# Patient Record
Sex: FEMALE | Race: WHITE | Employment: OTHER | URBAN - METROPOLITAN AREA
[De-identification: names, ages, dates, MRNs, and addresses within clinical notes are randomized per-mention and may not be internally consistent; named-entity substitution may affect disease eponyms.]

---

## 2017-01-16 RX ORDER — CANAGLIFLOZIN 300 MG/1
TABLET, FILM COATED ORAL
Qty: 28 TABLET | Refills: 3 | Status: SHIPPED | OUTPATIENT
Start: 2017-01-16 | End: 2018-06-25 | Stop reason: ALTCHOICE

## 2017-02-02 ENCOUNTER — OFFICE VISIT (OUTPATIENT)
Dept: PHYSICAL MEDICINE AND REHAB | Facility: CLINIC | Age: 62
End: 2017-02-02

## 2017-02-02 VITALS
TEMPERATURE: 98.4 F | DIASTOLIC BLOOD PRESSURE: 82 MMHG | SYSTOLIC BLOOD PRESSURE: 138 MMHG | BODY MASS INDEX: 36.85 KG/M2 | HEIGHT: 65 IN | WEIGHT: 221.2 LBS | HEART RATE: 74 BPM
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
## 2017-02-02 DIAGNOSIS — M25.552 LEFT HIP PAIN: ICD-10-CM

## 2017-02-02 DIAGNOSIS — M25.511 ACUTE PAIN OF RIGHT SHOULDER: Primary | ICD-10-CM
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
## 2017-02-02 PROBLEM — R29.6 FALLS: Status: ACTIVE | Noted: 2017-02-02

## 2017-02-02 PROCEDURE — 3014F SCREEN MAMMO DOC REV: CPT | Performed by: PHYSICAL MEDICINE & REHABILITATION

## 2017-02-02 PROCEDURE — G8484 FLU IMMUNIZE NO ADMIN: HCPCS | Performed by: PHYSICAL MEDICINE & REHABILITATION

## 2017-02-02 PROCEDURE — G8419 CALC BMI OUT NRM PARAM NOF/U: HCPCS | Performed by: PHYSICAL MEDICINE & REHABILITATION

## 2017-02-02 PROCEDURE — 99214 OFFICE O/P EST MOD 30 MIN: CPT | Performed by: PHYSICAL MEDICINE & REHABILITATION

## 2017-02-02 PROCEDURE — 1036F TOBACCO NON-USER: CPT | Performed by: PHYSICAL MEDICINE & REHABILITATION

## 2017-02-02 PROCEDURE — 3017F COLORECTAL CA SCREEN DOC REV: CPT | Performed by: PHYSICAL MEDICINE & REHABILITATION

## 2017-02-02 PROCEDURE — G8427 DOCREV CUR MEDS BY ELIG CLIN: HCPCS | Performed by: PHYSICAL MEDICINE & REHABILITATION

## 2017-02-10 ENCOUNTER — HOSPITAL ENCOUNTER (OUTPATIENT)
Age: 62
Discharge: HOME | End: 2017-02-10
Payer: MEDICARE

## 2017-02-10 LAB
ALBUMIN SERPL-MCNC: 4.2 G/DL (ref 3.5–5.2)
ALBUMIN/GLOBULIN RATIO: ABNORMAL (ref 1–2.5)
ALP BLD-CCNC: 73 U/L (ref 35–104)
ALT SERPL-CCNC: 34 U/L (ref 5–33)
ANION GAP SERPL CALCULATED.3IONS-SCNC: 12 MMOL/L (ref 9–17)
AST SERPL-CCNC: 25 U/L
BILIRUB SERPL-MCNC: 0.4 MG/DL (ref 0.3–1.2)
BUN BLDV-MCNC: 17 MG/DL (ref 8–23)
BUN/CREAT BLD: ABNORMAL (ref 9–20)
CALCIUM SERPL-MCNC: 9.8 MG/DL (ref 8.6–10.4)
CHLORIDE BLD-SCNC: 104 MMOL/L (ref 98–107)
CHOLESTEROL/HDL RATIO: 4.4
CHOLESTEROL: 229 MG/DL
CO2: 26 MMOL/L (ref 20–31)
CREAT SERPL-MCNC: 0.88 MG/DL (ref 0.5–0.9)
GFR AFRICAN AMERICAN: >60 ML/MIN
GFR NON-AFRICAN AMERICAN: >60 ML/MIN
GFR SERPL CREATININE-BSD FRML MDRD: ABNORMAL ML/MIN/{1.73_M2}
GFR SERPL CREATININE-BSD FRML MDRD: ABNORMAL ML/MIN/{1.73_M2}
GLUCOSE BLD-MCNC: 200 MG/DL (ref 70–99)
HDLC SERPL-MCNC: 52 MG/DL
LDL CHOLESTEROL: 143 MG/DL (ref 0–130)
POTASSIUM SERPL-SCNC: 5.1 MMOL/L (ref 3.7–5.3)
SODIUM BLD-SCNC: 142 MMOL/L (ref 135–144)
TOTAL PROTEIN: 7.6 G/DL (ref 6.4–8.3)
TRIGL SERPL-MCNC: 170 MG/DL
VLDLC SERPL CALC-MCNC: ABNORMAL MG/DL (ref 1–30)

## 2017-02-10 PROCEDURE — 80053 COMPREHEN METABOLIC PANEL: CPT

## 2017-02-10 PROCEDURE — 80061 LIPID PANEL: CPT

## 2017-02-10 PROCEDURE — 36415 COLL VENOUS BLD VENIPUNCTURE: CPT

## 2017-02-21 ENCOUNTER — OFFICE VISIT (OUTPATIENT)
Dept: PHYSICAL MEDICINE AND REHAB | Facility: CLINIC | Age: 62
End: 2017-02-21

## 2017-02-21 VITALS
BODY MASS INDEX: 35.77 KG/M2 | TEMPERATURE: 98.2 F | HEIGHT: 65 IN | SYSTOLIC BLOOD PRESSURE: 117 MMHG | HEART RATE: 83 BPM | WEIGHT: 214.7 LBS | DIASTOLIC BLOOD PRESSURE: 73 MMHG

## 2017-02-21 DIAGNOSIS — G89.29 CHRONIC RIGHT SHOULDER PAIN: ICD-10-CM

## 2017-02-21 DIAGNOSIS — M25.511 CHRONIC RIGHT SHOULDER PAIN: ICD-10-CM

## 2017-02-21 DIAGNOSIS — G89.29 CHRONIC RIGHT HIP PAIN: Primary | ICD-10-CM

## 2017-02-21 DIAGNOSIS — M70.62 TROCHANTERIC BURSITIS OF BOTH HIPS: ICD-10-CM

## 2017-02-21 DIAGNOSIS — M70.61 TROCHANTERIC BURSITIS OF BOTH HIPS: ICD-10-CM

## 2017-02-21 DIAGNOSIS — M25.551 CHRONIC RIGHT HIP PAIN: Primary | ICD-10-CM

## 2017-02-21 PROCEDURE — G8484 FLU IMMUNIZE NO ADMIN: HCPCS | Performed by: PHYSICAL MEDICINE & REHABILITATION

## 2017-02-21 PROCEDURE — G8417 CALC BMI ABV UP PARAM F/U: HCPCS | Performed by: PHYSICAL MEDICINE & REHABILITATION

## 2017-02-21 PROCEDURE — 1036F TOBACCO NON-USER: CPT | Performed by: PHYSICAL MEDICINE & REHABILITATION

## 2017-02-21 PROCEDURE — 3014F SCREEN MAMMO DOC REV: CPT | Performed by: PHYSICAL MEDICINE & REHABILITATION

## 2017-02-21 PROCEDURE — G8427 DOCREV CUR MEDS BY ELIG CLIN: HCPCS | Performed by: PHYSICAL MEDICINE & REHABILITATION

## 2017-02-21 PROCEDURE — 99214 OFFICE O/P EST MOD 30 MIN: CPT | Performed by: PHYSICAL MEDICINE & REHABILITATION

## 2017-02-21 PROCEDURE — 3017F COLORECTAL CA SCREEN DOC REV: CPT | Performed by: PHYSICAL MEDICINE & REHABILITATION

## 2017-05-03 ENCOUNTER — HOSPITAL ENCOUNTER (OUTPATIENT)
Dept: PHYSICAL THERAPY | Age: 62
Setting detail: THERAPIES SERIES
Discharge: HOME | End: 2017-05-03
Payer: MEDICARE

## 2017-05-03 PROCEDURE — 97162 PT EVAL MOD COMPLEX 30 MIN: CPT

## 2017-05-03 PROCEDURE — G8978 MOBILITY CURRENT STATUS: HCPCS

## 2017-05-03 PROCEDURE — G8979 MOBILITY GOAL STATUS: HCPCS

## 2017-05-03 ASSESSMENT — PAIN SCALES - GENERAL: PAINLEVEL_OUTOF10: 6

## 2017-05-03 ASSESSMENT — PAIN DESCRIPTION - PAIN TYPE: TYPE: CHRONIC PAIN

## 2017-05-03 ASSESSMENT — PAIN DESCRIPTION - ORIENTATION: ORIENTATION: LEFT;RIGHT

## 2017-05-03 ASSESSMENT — PAIN DESCRIPTION - LOCATION: LOCATION: HIP

## 2017-05-05 ENCOUNTER — HOSPITAL ENCOUNTER (OUTPATIENT)
Dept: WOMENS IMAGING | Age: 62
Discharge: HOME | End: 2017-05-05
Payer: MEDICARE

## 2017-05-05 ENCOUNTER — HOSPITAL ENCOUNTER (OUTPATIENT)
Age: 62
Discharge: HOME | End: 2017-05-05
Payer: MEDICARE

## 2017-05-05 DIAGNOSIS — Z12.39 BREAST CANCER SCREENING: ICD-10-CM

## 2017-05-05 DIAGNOSIS — Z13.820 OSTEOPOROSIS SCREENING: ICD-10-CM

## 2017-05-05 LAB
-: ABNORMAL
ABSOLUTE EOS #: 0.2 K/UL (ref 0–0.4)
ABSOLUTE LYMPH #: 2 K/UL (ref 1–4.8)
ABSOLUTE MONO #: 0.9 K/UL (ref 0.1–1.3)
ALBUMIN SERPL-MCNC: 4.4 G/DL (ref 3.5–5.2)
ALBUMIN/GLOBULIN RATIO: ABNORMAL (ref 1–2.5)
ALP BLD-CCNC: 71 U/L (ref 35–104)
ALT SERPL-CCNC: 79 U/L (ref 5–33)
AMORPHOUS: ABNORMAL
ANION GAP SERPL CALCULATED.3IONS-SCNC: 18 MMOL/L (ref 9–17)
AST SERPL-CCNC: 66 U/L
BACTERIA: ABNORMAL
BASOPHILS # BLD: 1 %
BASOPHILS ABSOLUTE: 0.1 K/UL (ref 0–0.2)
BILIRUB SERPL-MCNC: 0.47 MG/DL (ref 0.3–1.2)
BILIRUBIN DIRECT: 0.14 MG/DL
BILIRUBIN URINE: ABNORMAL
BILIRUBIN, INDIRECT: 0.33 MG/DL (ref 0–1)
BUN BLDV-MCNC: 26 MG/DL (ref 8–23)
BUN/CREAT BLD: ABNORMAL (ref 9–20)
CALCIUM SERPL-MCNC: 9.8 MG/DL (ref 8.6–10.4)
CASTS UA: ABNORMAL /LPF
CHLORIDE BLD-SCNC: 98 MMOL/L (ref 98–107)
CHOLESTEROL/HDL RATIO: 3.3
CHOLESTEROL: 211 MG/DL
CO2: 23 MMOL/L (ref 20–31)
COLOR: YELLOW
COMMENT UA: ABNORMAL
CREAT SERPL-MCNC: 0.96 MG/DL (ref 0.5–0.9)
CREATININE URINE: 242.7 MG/DL (ref 28–217)
CRYSTALS, UA: ABNORMAL /HPF
DIFFERENTIAL TYPE: ABNORMAL
EOSINOPHILS RELATIVE PERCENT: 2 %
EPITHELIAL CELLS UA: ABNORMAL /HPF
ESTIMATED AVERAGE GLUCOSE: 163 MG/DL
GFR AFRICAN AMERICAN: >60 ML/MIN
GFR NON-AFRICAN AMERICAN: 59 ML/MIN
GFR SERPL CREATININE-BSD FRML MDRD: ABNORMAL ML/MIN/{1.73_M2}
GFR SERPL CREATININE-BSD FRML MDRD: ABNORMAL ML/MIN/{1.73_M2}
GLOBULIN: ABNORMAL G/DL (ref 1.5–3.8)
GLUCOSE BLD-MCNC: 135 MG/DL (ref 70–99)
GLUCOSE URINE: ABNORMAL
HBA1C MFR BLD: 7.3 % (ref 4–6)
HCT VFR BLD CALC: 39.6 % (ref 36–46)
HDLC SERPL-MCNC: 64 MG/DL
HEMOGLOBIN: 13.1 G/DL (ref 12–16)
KETONES, URINE: NEGATIVE
LDL CHOLESTEROL: 116 MG/DL (ref 0–130)
LEUKOCYTE ESTERASE, URINE: NEGATIVE
LYMPHOCYTES # BLD: 18 %
MCH RBC QN AUTO: 27.3 PG (ref 26–34)
MCHC RBC AUTO-ENTMCNC: 33.1 G/DL (ref 31–37)
MCV RBC AUTO: 82.5 FL (ref 80–100)
MICROALBUMIN/CREAT 24H UR: 54 MG/L
MICROALBUMIN/CREAT UR-RTO: 22 MCG/MG CREAT
MONOCYTES # BLD: 8 %
MUCUS: ABNORMAL
NITRITE, URINE: NEGATIVE
OTHER OBSERVATIONS UA: ABNORMAL
PDW BLD-RTO: 15.7 % (ref 11.5–14.9)
PH UA: 5 (ref 5–8)
PLATELET # BLD: 298 K/UL (ref 150–450)
PLATELET ESTIMATE: ABNORMAL
PMV BLD AUTO: 8.2 FL (ref 6–12)
POTASSIUM SERPL-SCNC: 4.1 MMOL/L (ref 3.7–5.3)
PROTEIN UA: ABNORMAL
RBC # BLD: 4.8 M/UL (ref 4–5.2)
RBC # BLD: ABNORMAL 10*6/UL
RBC UA: ABNORMAL /HPF
RENAL EPITHELIAL, UA: ABNORMAL /HPF
SEG NEUTROPHILS: 71 %
SEGMENTED NEUTROPHILS ABSOLUTE COUNT: 7.7 K/UL (ref 1.3–9.1)
SODIUM BLD-SCNC: 139 MMOL/L (ref 135–144)
SPECIFIC GRAVITY UA: 1.04 (ref 1–1.03)
THYROXINE, FREE: 1.11 NG/DL (ref 0.93–1.7)
TOTAL PROTEIN: 8.1 G/DL (ref 6.4–8.3)
TRICHOMONAS: ABNORMAL
TRIGL SERPL-MCNC: 154 MG/DL
TSH SERPL DL<=0.05 MIU/L-ACNC: 0.84 MIU/L (ref 0.3–5)
TSH SERPL DL<=0.05 MIU/L-ACNC: 0.89 MIU/L (ref 0.3–5)
TURBIDITY: ABNORMAL
URINE HGB: NEGATIVE
UROBILINOGEN, URINE: NORMAL
VITAMIN D 25-HYDROXY: 40.6 NG/ML (ref 30–100)
VLDLC SERPL CALC-MCNC: ABNORMAL MG/DL (ref 1–30)
WBC # BLD: 10.9 K/UL (ref 3.5–11)
WBC # BLD: ABNORMAL 10*3/UL
WBC UA: ABNORMAL /HPF
YEAST: ABNORMAL

## 2017-05-05 PROCEDURE — 85025 COMPLETE CBC W/AUTO DIFF WBC: CPT

## 2017-05-05 PROCEDURE — 81001 URINALYSIS AUTO W/SCOPE: CPT

## 2017-05-05 PROCEDURE — 77080 DXA BONE DENSITY AXIAL: CPT

## 2017-05-05 PROCEDURE — 82570 ASSAY OF URINE CREATININE: CPT

## 2017-05-05 PROCEDURE — 77063 BREAST TOMOSYNTHESIS BI: CPT

## 2017-05-05 PROCEDURE — 83036 HEMOGLOBIN GLYCOSYLATED A1C: CPT

## 2017-05-05 PROCEDURE — 82043 UR ALBUMIN QUANTITATIVE: CPT

## 2017-05-05 PROCEDURE — 80048 BASIC METABOLIC PNL TOTAL CA: CPT

## 2017-05-05 PROCEDURE — 84439 ASSAY OF FREE THYROXINE: CPT

## 2017-05-05 PROCEDURE — 36415 COLL VENOUS BLD VENIPUNCTURE: CPT

## 2017-05-05 PROCEDURE — 84443 ASSAY THYROID STIM HORMONE: CPT

## 2017-05-05 PROCEDURE — 82306 VITAMIN D 25 HYDROXY: CPT

## 2017-05-05 PROCEDURE — 80076 HEPATIC FUNCTION PANEL: CPT

## 2017-05-05 PROCEDURE — 80061 LIPID PANEL: CPT

## 2017-05-10 ASSESSMENT — PAIN DESCRIPTION - ORIENTATION: ORIENTATION: LEFT;RIGHT

## 2017-05-10 ASSESSMENT — PAIN DESCRIPTION - PAIN TYPE: TYPE: CHRONIC PAIN

## 2017-05-10 ASSESSMENT — PAIN SCALES - GENERAL: PAINLEVEL_OUTOF10: 6

## 2017-05-10 ASSESSMENT — PAIN DESCRIPTION - LOCATION: LOCATION: HIP

## 2017-05-11 ENCOUNTER — OFFICE VISIT (OUTPATIENT)
Dept: PHYSICAL MEDICINE AND REHAB | Age: 62
End: 2017-05-11
Payer: MEDICARE

## 2017-05-11 VITALS
SYSTOLIC BLOOD PRESSURE: 107 MMHG | DIASTOLIC BLOOD PRESSURE: 69 MMHG | HEIGHT: 66 IN | TEMPERATURE: 98.3 F | BODY MASS INDEX: 34.01 KG/M2 | WEIGHT: 211.6 LBS | HEART RATE: 76 BPM

## 2017-05-11 DIAGNOSIS — M25.552 LEFT HIP PAIN: ICD-10-CM

## 2017-05-11 DIAGNOSIS — M25.551 CHRONIC RIGHT HIP PAIN: Primary | ICD-10-CM

## 2017-05-11 DIAGNOSIS — G89.29 CHRONIC RIGHT HIP PAIN: Primary | ICD-10-CM

## 2017-05-11 PROCEDURE — 3014F SCREEN MAMMO DOC REV: CPT | Performed by: PHYSICAL MEDICINE & REHABILITATION

## 2017-05-11 PROCEDURE — 1036F TOBACCO NON-USER: CPT | Performed by: PHYSICAL MEDICINE & REHABILITATION

## 2017-05-11 PROCEDURE — 99214 OFFICE O/P EST MOD 30 MIN: CPT | Performed by: PHYSICAL MEDICINE & REHABILITATION

## 2017-05-11 PROCEDURE — G8417 CALC BMI ABV UP PARAM F/U: HCPCS | Performed by: PHYSICAL MEDICINE & REHABILITATION

## 2017-05-11 PROCEDURE — G8428 CUR MEDS NOT DOCUMENT: HCPCS | Performed by: PHYSICAL MEDICINE & REHABILITATION

## 2017-05-11 PROCEDURE — 3017F COLORECTAL CA SCREEN DOC REV: CPT | Performed by: PHYSICAL MEDICINE & REHABILITATION

## 2017-05-11 RX ORDER — ARIPIPRAZOLE 5 MG/1
5 TABLET ORAL DAILY
COMMUNITY
End: 2018-06-25 | Stop reason: ALTCHOICE

## 2017-05-11 RX ORDER — LEVOFLOXACIN 5 MG/ML
500 INJECTION, SOLUTION INTRAVENOUS
COMMUNITY
End: 2018-06-25 | Stop reason: ALTCHOICE

## 2017-05-11 RX ORDER — MONTELUKAST SODIUM 10 MG/1
10 TABLET ORAL NIGHTLY
COMMUNITY

## 2017-05-12 ENCOUNTER — HOSPITAL ENCOUNTER (OUTPATIENT)
Dept: PHYSICAL THERAPY | Age: 62
Setting detail: THERAPIES SERIES
Discharge: HOME | End: 2017-05-12
Payer: MEDICARE

## 2017-05-12 PROCEDURE — 97110 THERAPEUTIC EXERCISES: CPT

## 2017-05-12 ASSESSMENT — PAIN DESCRIPTION - PAIN TYPE: TYPE: CHRONIC PAIN

## 2017-05-12 ASSESSMENT — PAIN SCALES - GENERAL: PAINLEVEL_OUTOF10: 5

## 2017-05-12 ASSESSMENT — PAIN DESCRIPTION - LOCATION: LOCATION: BACK

## 2017-05-15 ENCOUNTER — HOSPITAL ENCOUNTER (OUTPATIENT)
Dept: PHYSICAL THERAPY | Age: 62
Setting detail: THERAPIES SERIES
Discharge: HOME | End: 2017-05-15
Payer: MEDICARE

## 2017-05-15 ASSESSMENT — PAIN DESCRIPTION - LOCATION: LOCATION: HIP;BACK;SHOULDER

## 2017-05-15 ASSESSMENT — PAIN SCALES - GENERAL: PAINLEVEL_OUTOF10: 5

## 2017-05-15 ASSESSMENT — PAIN DESCRIPTION - PAIN TYPE: TYPE: CHRONIC PAIN

## 2017-05-15 ASSESSMENT — PAIN DESCRIPTION - ORIENTATION: ORIENTATION: LEFT;RIGHT

## 2017-05-17 ENCOUNTER — HOSPITAL ENCOUNTER (OUTPATIENT)
Dept: PHYSICAL THERAPY | Age: 62
Setting detail: THERAPIES SERIES
Discharge: HOME | End: 2017-05-17
Payer: MEDICARE

## 2017-05-17 PROCEDURE — 97110 THERAPEUTIC EXERCISES: CPT

## 2017-05-17 ASSESSMENT — PAIN DESCRIPTION - PAIN TYPE: TYPE: CHRONIC PAIN

## 2017-05-17 ASSESSMENT — PAIN DESCRIPTION - ORIENTATION: ORIENTATION: LEFT;RIGHT

## 2017-05-17 ASSESSMENT — PAIN SCALES - GENERAL: PAINLEVEL_OUTOF10: 6

## 2017-05-23 ENCOUNTER — HOSPITAL ENCOUNTER (OUTPATIENT)
Dept: PHYSICAL THERAPY | Age: 62
Setting detail: THERAPIES SERIES
Discharge: HOME | End: 2017-05-23
Payer: MEDICARE

## 2017-05-23 PROCEDURE — 97110 THERAPEUTIC EXERCISES: CPT

## 2017-05-23 ASSESSMENT — PAIN DESCRIPTION - ORIENTATION: ORIENTATION: RIGHT

## 2017-05-23 ASSESSMENT — PAIN DESCRIPTION - LOCATION: LOCATION: BACK;HIP

## 2017-05-23 ASSESSMENT — PAIN SCALES - GENERAL: PAINLEVEL_OUTOF10: 7

## 2017-05-23 ASSESSMENT — PAIN DESCRIPTION - PAIN TYPE: TYPE: CHRONIC PAIN

## 2017-05-26 ENCOUNTER — HOSPITAL ENCOUNTER (OUTPATIENT)
Dept: PHYSICAL THERAPY | Age: 62
Setting detail: THERAPIES SERIES
Discharge: HOME | End: 2017-05-26
Payer: MEDICARE

## 2017-05-26 PROCEDURE — 97110 THERAPEUTIC EXERCISES: CPT

## 2017-05-26 ASSESSMENT — PAIN DESCRIPTION - LOCATION: LOCATION: BACK

## 2017-05-26 ASSESSMENT — PAIN SCALES - GENERAL: PAINLEVEL_OUTOF10: 6

## 2017-05-26 ASSESSMENT — PAIN DESCRIPTION - PAIN TYPE: TYPE: CHRONIC PAIN

## 2017-05-26 ASSESSMENT — PAIN DESCRIPTION - ORIENTATION: ORIENTATION: LOWER

## 2017-06-01 ENCOUNTER — HOSPITAL ENCOUNTER (OUTPATIENT)
Dept: PHYSICAL THERAPY | Age: 62
Setting detail: THERAPIES SERIES
Discharge: HOME | End: 2017-06-01
Payer: MEDICARE

## 2017-06-01 PROCEDURE — 97110 THERAPEUTIC EXERCISES: CPT

## 2017-06-01 PROCEDURE — G8978 MOBILITY CURRENT STATUS: HCPCS

## 2017-06-01 PROCEDURE — G8979 MOBILITY GOAL STATUS: HCPCS

## 2017-06-01 ASSESSMENT — PAIN DESCRIPTION - PAIN TYPE: TYPE: CHRONIC PAIN

## 2017-06-01 ASSESSMENT — PAIN DESCRIPTION - LOCATION: LOCATION: BACK

## 2017-06-01 ASSESSMENT — PAIN SCALES - GENERAL: PAINLEVEL_OUTOF10: 5

## 2017-06-02 ENCOUNTER — HOSPITAL ENCOUNTER (OUTPATIENT)
Dept: PHYSICAL THERAPY | Age: 62
Setting detail: THERAPIES SERIES
Discharge: HOME | End: 2017-06-02
Payer: MEDICARE

## 2017-06-02 PROCEDURE — 97110 THERAPEUTIC EXERCISES: CPT

## 2017-06-02 PROCEDURE — 97112 NEUROMUSCULAR REEDUCATION: CPT

## 2017-06-02 ASSESSMENT — PAIN SCALES - GENERAL: PAINLEVEL_OUTOF10: 3

## 2017-06-02 ASSESSMENT — PAIN DESCRIPTION - LOCATION: LOCATION: BACK

## 2017-06-02 ASSESSMENT — PAIN DESCRIPTION - PAIN TYPE: TYPE: CHRONIC PAIN

## 2017-06-05 ENCOUNTER — HOSPITAL ENCOUNTER (OUTPATIENT)
Dept: PHYSICAL THERAPY | Age: 62
Setting detail: THERAPIES SERIES
Discharge: HOME | End: 2017-06-05
Payer: MEDICARE

## 2017-06-05 PROCEDURE — 97110 THERAPEUTIC EXERCISES: CPT

## 2017-06-07 ASSESSMENT — PAIN SCALES - GENERAL: PAINLEVEL_OUTOF10: 3

## 2017-06-07 ASSESSMENT — PAIN DESCRIPTION - LOCATION: LOCATION: BACK

## 2017-06-07 ASSESSMENT — PAIN DESCRIPTION - PAIN TYPE: TYPE: CHRONIC PAIN

## 2017-06-08 ENCOUNTER — HOSPITAL ENCOUNTER (OUTPATIENT)
Dept: PHYSICAL THERAPY | Age: 62
Setting detail: THERAPIES SERIES
Discharge: HOME | End: 2017-06-08
Payer: MEDICARE

## 2017-06-08 PROCEDURE — 97110 THERAPEUTIC EXERCISES: CPT

## 2017-06-08 ASSESSMENT — PAIN SCALES - GENERAL: PAINLEVEL_OUTOF10: 6

## 2017-06-08 ASSESSMENT — PAIN DESCRIPTION - PAIN TYPE: TYPE: CHRONIC PAIN

## 2017-06-15 ENCOUNTER — HOSPITAL ENCOUNTER (OUTPATIENT)
Dept: PHYSICAL THERAPY | Age: 62
Setting detail: THERAPIES SERIES
Discharge: HOME | End: 2017-06-15
Payer: MEDICARE

## 2017-06-15 PROCEDURE — 97110 THERAPEUTIC EXERCISES: CPT

## 2017-06-15 ASSESSMENT — PAIN DESCRIPTION - DIRECTION: RADIATING_TOWARDS: R HIP

## 2017-06-15 ASSESSMENT — PAIN DESCRIPTION - LOCATION: LOCATION: BACK

## 2017-06-15 ASSESSMENT — PAIN SCALES - GENERAL: PAINLEVEL_OUTOF10: 4

## 2017-06-16 ENCOUNTER — HOSPITAL ENCOUNTER (OUTPATIENT)
Dept: PHYSICAL THERAPY | Age: 62
Setting detail: THERAPIES SERIES
Discharge: HOME | End: 2017-06-16
Payer: MEDICARE

## 2017-06-16 PROCEDURE — 97110 THERAPEUTIC EXERCISES: CPT

## 2017-06-16 ASSESSMENT — PAIN SCALES - GENERAL: PAINLEVEL_OUTOF10: 7

## 2017-06-16 ASSESSMENT — PAIN DESCRIPTION - PAIN TYPE: TYPE: CHRONIC PAIN

## 2017-06-16 ASSESSMENT — PAIN DESCRIPTION - LOCATION: LOCATION: BACK;HIP

## 2017-06-22 ENCOUNTER — OFFICE VISIT (OUTPATIENT)
Dept: PHYSICAL MEDICINE AND REHAB | Age: 62
End: 2017-06-22
Payer: MEDICARE

## 2017-06-22 VITALS
SYSTOLIC BLOOD PRESSURE: 124 MMHG | TEMPERATURE: 98.6 F | HEART RATE: 85 BPM | BODY MASS INDEX: 35.16 KG/M2 | WEIGHT: 211 LBS | HEIGHT: 65 IN | DIASTOLIC BLOOD PRESSURE: 73 MMHG

## 2017-06-22 DIAGNOSIS — G89.29 CHRONIC RIGHT HIP PAIN: ICD-10-CM

## 2017-06-22 DIAGNOSIS — M25.511 ACUTE PAIN OF RIGHT SHOULDER: Primary | ICD-10-CM

## 2017-06-22 DIAGNOSIS — M25.552 LEFT HIP PAIN: ICD-10-CM

## 2017-06-22 DIAGNOSIS — M25.551 CHRONIC RIGHT HIP PAIN: ICD-10-CM

## 2017-06-22 PROCEDURE — 1036F TOBACCO NON-USER: CPT | Performed by: PHYSICAL MEDICINE & REHABILITATION

## 2017-06-22 PROCEDURE — G8417 CALC BMI ABV UP PARAM F/U: HCPCS | Performed by: PHYSICAL MEDICINE & REHABILITATION

## 2017-06-22 PROCEDURE — 99214 OFFICE O/P EST MOD 30 MIN: CPT | Performed by: PHYSICAL MEDICINE & REHABILITATION

## 2017-06-22 PROCEDURE — 3014F SCREEN MAMMO DOC REV: CPT | Performed by: PHYSICAL MEDICINE & REHABILITATION

## 2017-06-22 PROCEDURE — 3017F COLORECTAL CA SCREEN DOC REV: CPT | Performed by: PHYSICAL MEDICINE & REHABILITATION

## 2017-06-22 PROCEDURE — G8428 CUR MEDS NOT DOCUMENT: HCPCS | Performed by: PHYSICAL MEDICINE & REHABILITATION

## 2017-06-22 RX ORDER — QUETIAPINE FUMARATE 50 MG/1
50 TABLET, EXTENDED RELEASE ORAL NIGHTLY
COMMUNITY
End: 2018-06-25 | Stop reason: ALTCHOICE

## 2017-06-22 RX ORDER — ROSUVASTATIN CALCIUM 40 MG/1
40 TABLET, COATED ORAL EVERY EVENING
COMMUNITY

## 2017-06-26 ENCOUNTER — HOSPITAL ENCOUNTER (OUTPATIENT)
Dept: PHYSICAL THERAPY | Age: 62
Setting detail: THERAPIES SERIES
Discharge: HOME | End: 2017-06-26
Payer: MEDICARE

## 2017-06-26 PROCEDURE — 97110 THERAPEUTIC EXERCISES: CPT

## 2017-06-26 ASSESSMENT — PAIN DESCRIPTION - PAIN TYPE: TYPE: CHRONIC PAIN

## 2017-06-26 ASSESSMENT — PAIN SCALES - GENERAL: PAINLEVEL_OUTOF10: 6

## 2017-06-26 ASSESSMENT — PAIN DESCRIPTION - LOCATION: LOCATION: BACK;HIP

## 2017-06-29 ENCOUNTER — HOSPITAL ENCOUNTER (OUTPATIENT)
Dept: PHYSICAL THERAPY | Age: 62
Setting detail: THERAPIES SERIES
Discharge: HOME | End: 2017-06-29
Payer: MEDICARE

## 2017-06-29 PROCEDURE — 97110 THERAPEUTIC EXERCISES: CPT

## 2017-06-29 PROCEDURE — G8979 MOBILITY GOAL STATUS: HCPCS

## 2017-06-29 PROCEDURE — G8980 MOBILITY D/C STATUS: HCPCS

## 2017-06-29 ASSESSMENT — PAIN DESCRIPTION - PAIN TYPE: TYPE: CHRONIC PAIN

## 2017-06-29 ASSESSMENT — PAIN DESCRIPTION - LOCATION: LOCATION: BACK;HIP

## 2017-06-29 ASSESSMENT — PAIN SCALES - GENERAL: PAINLEVEL_OUTOF10: 7

## 2017-06-30 ENCOUNTER — APPOINTMENT (OUTPATIENT)
Dept: PHYSICAL THERAPY | Age: 62
End: 2017-06-30
Payer: MEDICARE

## 2017-08-17 ENCOUNTER — OFFICE VISIT (OUTPATIENT)
Dept: PHYSICAL MEDICINE AND REHAB | Age: 62
End: 2017-08-17
Payer: MEDICARE

## 2017-08-17 VITALS
DIASTOLIC BLOOD PRESSURE: 80 MMHG | BODY MASS INDEX: 35.12 KG/M2 | HEIGHT: 65 IN | WEIGHT: 210.8 LBS | HEART RATE: 80 BPM | SYSTOLIC BLOOD PRESSURE: 125 MMHG | TEMPERATURE: 98.4 F

## 2017-08-17 DIAGNOSIS — M70.62 TROCHANTERIC BURSITIS OF BOTH HIPS: ICD-10-CM

## 2017-08-17 DIAGNOSIS — M70.61 TROCHANTERIC BURSITIS OF BOTH HIPS: ICD-10-CM

## 2017-08-17 PROCEDURE — G8427 DOCREV CUR MEDS BY ELIG CLIN: HCPCS | Performed by: PHYSICAL MEDICINE & REHABILITATION

## 2017-08-17 PROCEDURE — G8417 CALC BMI ABV UP PARAM F/U: HCPCS | Performed by: PHYSICAL MEDICINE & REHABILITATION

## 2017-08-17 PROCEDURE — 3014F SCREEN MAMMO DOC REV: CPT | Performed by: PHYSICAL MEDICINE & REHABILITATION

## 2017-08-17 PROCEDURE — 3017F COLORECTAL CA SCREEN DOC REV: CPT | Performed by: PHYSICAL MEDICINE & REHABILITATION

## 2017-08-17 PROCEDURE — 99214 OFFICE O/P EST MOD 30 MIN: CPT | Performed by: PHYSICAL MEDICINE & REHABILITATION

## 2017-08-17 PROCEDURE — 1036F TOBACCO NON-USER: CPT | Performed by: PHYSICAL MEDICINE & REHABILITATION

## 2017-08-17 RX ORDER — BUDESONIDE AND FORMOTEROL FUMARATE DIHYDRATE 160; 4.5 UG/1; UG/1
AEROSOL RESPIRATORY (INHALATION)
COMMUNITY
Start: 2017-08-01 | End: 2018-08-01 | Stop reason: SDUPTHER

## 2017-08-17 RX ORDER — DULAGLUTIDE 1.5 MG/.5ML
INJECTION, SOLUTION SUBCUTANEOUS WEEKLY
COMMUNITY
Start: 2017-08-11 | End: 2018-08-01 | Stop reason: SDUPTHER

## 2017-08-17 RX ORDER — NAPROXEN 500 MG/1
500 TABLET ORAL 2 TIMES DAILY WITH MEALS
Qty: 60 TABLET | Refills: 3 | Status: CANCELLED | OUTPATIENT
Start: 2017-08-17

## 2017-08-21 ENCOUNTER — TELEPHONE (OUTPATIENT)
Dept: PHYSICAL MEDICINE AND REHAB | Age: 62
End: 2017-08-21

## 2017-10-26 ENCOUNTER — OFFICE VISIT (OUTPATIENT)
Dept: PHYSICAL MEDICINE AND REHAB | Age: 62
End: 2017-10-26
Payer: MEDICARE

## 2017-10-26 VITALS
WEIGHT: 210 LBS | BODY MASS INDEX: 34.99 KG/M2 | DIASTOLIC BLOOD PRESSURE: 73 MMHG | TEMPERATURE: 98.1 F | HEIGHT: 65 IN | HEART RATE: 82 BPM | SYSTOLIC BLOOD PRESSURE: 133 MMHG

## 2017-10-26 DIAGNOSIS — R26.89 BALANCE DISORDER: ICD-10-CM

## 2017-10-26 DIAGNOSIS — M70.62 TROCHANTERIC BURSITIS OF BOTH HIPS: ICD-10-CM

## 2017-10-26 DIAGNOSIS — M54.59 MECHANICAL LOW BACK PAIN: Primary | ICD-10-CM

## 2017-10-26 DIAGNOSIS — M70.61 TROCHANTERIC BURSITIS OF BOTH HIPS: ICD-10-CM

## 2017-10-26 PROCEDURE — G8484 FLU IMMUNIZE NO ADMIN: HCPCS | Performed by: PHYSICAL MEDICINE & REHABILITATION

## 2017-10-26 PROCEDURE — G8427 DOCREV CUR MEDS BY ELIG CLIN: HCPCS | Performed by: PHYSICAL MEDICINE & REHABILITATION

## 2017-10-26 PROCEDURE — 1036F TOBACCO NON-USER: CPT | Performed by: PHYSICAL MEDICINE & REHABILITATION

## 2017-10-26 PROCEDURE — 3017F COLORECTAL CA SCREEN DOC REV: CPT | Performed by: PHYSICAL MEDICINE & REHABILITATION

## 2017-10-26 PROCEDURE — 3014F SCREEN MAMMO DOC REV: CPT | Performed by: PHYSICAL MEDICINE & REHABILITATION

## 2017-10-26 PROCEDURE — 99213 OFFICE O/P EST LOW 20 MIN: CPT | Performed by: PHYSICAL MEDICINE & REHABILITATION

## 2017-10-26 PROCEDURE — G8417 CALC BMI ABV UP PARAM F/U: HCPCS | Performed by: PHYSICAL MEDICINE & REHABILITATION

## 2017-10-26 RX ORDER — LOSARTAN POTASSIUM 50 MG/1
50 TABLET ORAL DAILY
COMMUNITY
Start: 2017-10-25

## 2017-10-26 RX ORDER — DAPAGLIFLOZIN 10 MG/1
TABLET, FILM COATED ORAL
COMMUNITY
Start: 2017-10-25 | End: 2018-08-01 | Stop reason: SDUPTHER

## 2017-10-26 RX ORDER — BENZTROPINE MESYLATE 0.5 MG/1
TABLET ORAL
Refills: 0 | COMMUNITY
Start: 2017-09-11 | End: 2020-01-15 | Stop reason: ALTCHOICE

## 2017-10-26 RX ORDER — INFLUENZA VIRUS VACCINE 15; 15; 15; 15 UG/.5ML; UG/.5ML; UG/.5ML; UG/.5ML
SUSPENSION INTRAMUSCULAR
Refills: 0 | COMMUNITY
Start: 2017-08-04 | End: 2021-11-17

## 2017-10-30 ENCOUNTER — HOSPITAL ENCOUNTER (OUTPATIENT)
Dept: PHYSICAL THERAPY | Age: 62
Setting detail: THERAPIES SERIES
Discharge: HOME | End: 2017-10-30
Payer: MEDICARE

## 2017-10-30 PROCEDURE — 97162 PT EVAL MOD COMPLEX 30 MIN: CPT

## 2017-10-30 PROCEDURE — G8979 MOBILITY GOAL STATUS: HCPCS

## 2017-10-30 PROCEDURE — G8978 MOBILITY CURRENT STATUS: HCPCS

## 2017-10-30 ASSESSMENT — PAIN SCALES - GENERAL: PAINLEVEL_OUTOF10: 5

## 2017-10-30 ASSESSMENT — PAIN DESCRIPTION - DESCRIPTORS: DESCRIPTORS: STABBING;SHARP;SHOOTING

## 2017-10-30 ASSESSMENT — PAIN DESCRIPTION - PAIN TYPE: TYPE: CHRONIC PAIN

## 2017-10-30 ASSESSMENT — PAIN DESCRIPTION - LOCATION: LOCATION: BACK

## 2017-11-01 PROCEDURE — G8978 MOBILITY CURRENT STATUS: HCPCS

## 2017-11-01 PROCEDURE — G8979 MOBILITY GOAL STATUS: HCPCS

## 2017-11-01 ASSESSMENT — PAIN DESCRIPTION - LOCATION: LOCATION: BACK

## 2017-11-01 ASSESSMENT — PAIN DESCRIPTION - PAIN TYPE: TYPE: CHRONIC PAIN

## 2017-11-01 ASSESSMENT — PAIN DESCRIPTION - DESCRIPTORS: DESCRIPTORS: STABBING;SHOOTING;SHARP

## 2017-11-01 ASSESSMENT — PAIN SCALES - GENERAL: PAINLEVEL_OUTOF10: 5

## 2017-11-01 NOTE — PROGRESS NOTES
Physical Therapy  Initial Assessment  Date: 10/30/2017  Patient Name: Stan Senior  MRN: [id number]  : 1955     Treatment Diagnosis: difficulty walking      Subjective   General  Referring Practitioner: Hamilton Bedoya MD  Referral Date : 17  Diagnosis: Imbalance and coordination    PT Visit Information  Onset Date: 17 (17)  PT Insurance Information: Medicare Medicaid  Total # of Visits to Date: 1  Plan of Care/Certification Expiration Date: 17    Subjective  Subjective: Patient states she has difficulty walking and going up and down steps, frequently has LOB. Reports frequent falls and has difficulty getting up from the floor. R LE goes out, fatigues easily    Pain Screening  Patient Currently in Pain: Yes  Pain Assessment  Pain Assessment: 0-10  Pain Level: 5  Pain Type: Chronic pain  Pain Location: Back  Pain Descriptors: Stabbing; Shooting; Sharp  Vital Signs  Patient Currently in Pain: Yes    Objective    Strength RLE  R Hip Flexion: 4/5  R Knee Flexion: 4/5  R Knee Extension: 5/5  Strength LLE  L Hip Flexion: 4/5  L Knee Flexion: 4/5  L Knee Extension: 5/5  Strength Other  Other: + movement pattern deficits and core weakness     Additional Measures  Special Tests: Dynamic gait index =17     Ambulation  Ambulation?: Yes  Ambulation 1  Device: No Device  Quality of Gait: NBOS, reverse trendelenburg gait, path deviatiion, shuffling gait, limited thoracic rotation  Distance: Community  Stairs/Curb  Stairs?: No     Balance  Comments: demonstrates instability with increasing fatigue    Assessment   Conditions Requiring Skilled Therapeutic Intervention  Assessment: patient fatigues quickly and throws self around when fatigued and trying to complete functional activities  Treatment Diagnosis: difficulty walking  Decision Making: Medium Complexity  REQUIRES PT FOLLOW UP: Yes  Activity Tolerance  Activity Tolerance: Patient Tolerated treatment well    G-Code  PT G-Codes  Score: 17  Functional Sarah Alena, PT    [facility] @ [facility]  [address] [place] [address] Nad[place]  [place], [address] [facility]  Phone [phone]  Fax [phone]

## 2017-11-02 ENCOUNTER — HOSPITAL ENCOUNTER (OUTPATIENT)
Dept: PHYSICAL THERAPY | Age: 62
Setting detail: THERAPIES SERIES
Discharge: HOME | End: 2017-11-02
Payer: MEDICARE

## 2017-11-02 PROCEDURE — 97110 THERAPEUTIC EXERCISES: CPT

## 2017-11-02 PROCEDURE — 97112 NEUROMUSCULAR REEDUCATION: CPT

## 2017-11-02 ASSESSMENT — PAIN DESCRIPTION - DESCRIPTORS: DESCRIPTORS: ACHING;CONSTANT;STABBING

## 2017-11-02 ASSESSMENT — PAIN DESCRIPTION - PAIN TYPE: TYPE: CHRONIC PAIN

## 2017-11-02 ASSESSMENT — PAIN DESCRIPTION - LOCATION: LOCATION: BACK

## 2017-11-02 ASSESSMENT — PAIN SCALES - GENERAL: PAINLEVEL_OUTOF10: 3

## 2017-11-06 ENCOUNTER — HOSPITAL ENCOUNTER (OUTPATIENT)
Dept: PHYSICAL THERAPY | Age: 62
Setting detail: THERAPIES SERIES
Discharge: HOME | End: 2017-11-06
Payer: MEDICARE

## 2017-11-06 PROCEDURE — 97110 THERAPEUTIC EXERCISES: CPT

## 2017-11-06 PROCEDURE — 97112 NEUROMUSCULAR REEDUCATION: CPT

## 2017-11-06 ASSESSMENT — PAIN DESCRIPTION - LOCATION: LOCATION: BACK

## 2017-11-06 ASSESSMENT — PAIN SCALES - GENERAL: PAINLEVEL_OUTOF10: 3

## 2017-11-06 ASSESSMENT — PAIN DESCRIPTION - PAIN TYPE: TYPE: CHRONIC PAIN

## 2017-11-06 ASSESSMENT — PAIN DESCRIPTION - DESCRIPTORS: DESCRIPTORS: ACHING;CONSTANT

## 2017-11-06 NOTE — PROGRESS NOTES
Physical Therapy  [facility]: ELISABETH MELANY W     Date: 17  Patient Name: Colin Espitia         MRN: [id number]  Account: [de-identified]   : 1955  (62 y.o.) Gender: female        17 1257   General   Referring Practitioner Fauzia Haskins MD   Referral Date  17   Diagnosis Imbalance and coordination   PT Visit Information   Onset Date 17   PT Insurance Information Medicare Medicaid   Total # of Visits to Date 2   Plan of Care/Certification Expiration Date 17   Subjective   Subjective Patient reports falling last night but fell into her chair. Pain Screening   Patient Currently in Pain Yes   Pain Assessment   Pain Assessment 0-10   Pain Level 3   Pain Type Chronic pain   Pain Location Back   Pain Descriptors Aching;Constant; Stabbing   Exercises   Exercise 1 Step Ups 6\" F/L 10x   Exercise 3 calf stretch   Exercise 4 bridge  with ball [id number] hold   Exercise 5 clam shell  [id number] hold   Exercise 10 SLR [id number] hold   Exercise 11 HS stretch 2x 30sec   Exercise 12 Piriformis stretch [id number]   Exercise 13 step ups   Exercise 14 Mini Squats   Exercise 15 HEEL/TOES RAISES ON FOAM NO UE'S   Conditions Requiring Skilled Therapeutic Intervention   Body structures, Functions, Activity limitations Decreased functional mobility    Treatment Diagnosis difficulty walking   Prognosis Good   Activity Tolerance   Activity Tolerance Patient Tolerated treatment well;Patient limited by fatigue   Plan   Plan Continue with current plan   Electronically signed by Luis Abdi PTA

## 2017-11-06 NOTE — PROGRESS NOTES
Physical Therapy  [address]   Outpatient Physical Therapy  [address] [facility]. [address]  Phone: [phone]  Fax: [phone]  Daily Progress Note    Date: 17    Patient Name: Pat Whyte        MRN: [id number]  Account: [de-identified] : 1955      General Information:  Referring Practitioner: Gerhardt Sia, MD  Referral Date : 17  Diagnosis: Imbalance and coordination  Onset Date: 17  PT Insurance Information: Medicare Medicaid  Total # of Visits to Date: 3  Plan of Care/Certification Expiration Date: 17    Subjective:  Subjective: Patient reports being sore and increased pain in Low back and sciatic nerve pain after last visit. Pain:  Patient Currently in Pain: Yes  Pain Assessment: 0-10  Pain Level: 3  Pain Type: Chronic pain  Pain Location: Back  Pain Descriptors: Aching;Constant       Objective:  Exercise 1: Step Ups 6\" F/L 10x  Exercise 3: calf stretch  Exercise 4: bridge  with ball [id number] hold  Exercise 5: clam shell  [id number] hold  Exercise 11: HS stretch 2x 30sec  Exercise 12: Piriformis stretch [id number]  Exercise 13: step ups  Exercise 14: Mini Squats on foam  Exercise 15: HEEL/TOES RAISES ON FOAM NO UE'S  Exercise 16: 3-way hip (B) LE x10 reps           Assessment:   Body structures, Functions, Activity limitations: Decreased functional mobility   Treatment Diagnosis: difficulty walking  Prognosis: Good  REQUIRES PT FOLLOW UP: Yes  Activity Tolerance: Patient Tolerated treatment well;Patient limited by fatigue    Plan:  Plan: Continue with current plan    Therapy Time:  Time In: 1430  Time Out: 1515  Minutes: 45  Timed Code Treatment Minutes: 35 Minutes    Treatment Charges: Minutes Units   []  Ultrasound     []  Electrical-Stim     []  Iontophoresis     []  Traction     []  Massage       []  Eval     []  Gait     [x]  Ther Exercise  35 2    []  Manual Therapy       []  Ther Activities       []  Aquatics     []  Neuro Re-Ed       []  Other       Total Treatment Time: 35 2        Joslyn Lowe, PTA

## 2017-11-14 ENCOUNTER — HOSPITAL ENCOUNTER (OUTPATIENT)
Dept: PHYSICAL THERAPY | Age: 62
Setting detail: THERAPIES SERIES
Discharge: HOME | End: 2017-11-14
Payer: MEDICARE

## 2017-11-14 PROCEDURE — 97110 THERAPEUTIC EXERCISES: CPT

## 2017-11-14 NOTE — PROGRESS NOTES
[address]   Outpatient Physical Therapy  [address] [facility]. [address]  Phone: [phone]  Fax: [phone]    Daily Progress Note    Date: 17    Patient Name: Shelbie Davis        MRN: [id number]  Account: [de-identified] : 1955      General Information:  Referring Practitioner: Sravani Oneill MD  Referral Date : 17  Diagnosis: Imbalance and coordination  Onset Date: 17  PT Insurance Information: Medicare Medicaid  Total # of Visits to Date: 3  Plan of Care/Certification Expiration Date: 17  No Show: 1    Subjective:  No complaints from last visit      Pain:  Patient Currently in Pain: (P) Yes       Objective:  Exercise 1: clam shell with whole LE lift (VC's for breathing and abdominal bracing)  Exercise 2: supine hip abduction 2x10 reps  Exercise 3: leg press machine 25#  Exercise 4: gait training  Exercise 5: step tap forward  Exercise 6: cone tap  Exercise 7: squats  with ball       Comment: Gait training focused on heel toe pattern and bilateral LE abduction with each step. Patient requires continual VC's to control pace and proper mm activation to stabilize. Patient encouraged to  take her time with movements and slow down    Assessment:   Body structures, Functions, Activity limitations: (P) Decreased functional mobility   Treatment Diagnosis: (P) difficulty walking  REQUIRES PT FOLLOW UP: (P) Yes  Activity Tolerance: (P) Patient Tolerated treatment well       Plan:  Plan: (P) Continue with current plan       Therapy Time:7706-1385       Treatment Charges: Minutes Units   []  Ultrasound     []  Electrical-Stim     []  Iontophoresis     []  Traction     []  Massage       []  Eval     []  Gait     [x]  Ther Exercise 45  3    []  Manual Therapy       []  Ther Activities       []  Aquatics     []  Neuro Re-Ed       []  Other       Total Treatment Time: [facility], PT

## 2017-11-16 ENCOUNTER — HOSPITAL ENCOUNTER (OUTPATIENT)
Dept: PHYSICAL THERAPY | Age: 62
Setting detail: THERAPIES SERIES
Discharge: HOME | End: 2017-11-16
Payer: MEDICARE

## 2017-11-16 PROCEDURE — 97110 THERAPEUTIC EXERCISES: CPT

## 2017-11-17 NOTE — PROGRESS NOTES
[address]   Outpatient Physical Therapy  [address] [facility]. [address]  Phone: [phone]  Fax: [phone]    Daily Progress Note    Date: 17    Patient Name: Fly Gomez        MRN: [id number]  Account: [de-identified] : 1955      General Information:  Referring Practitioner: Trevon Kendrick MD  Referral Date : 17  Diagnosis: Imbalance and coordination  Onset Date: (P) 17  PT Insurance Information: (P) Medicare Medicaid  Total # of Visits to Date: (P) 4  Plan of Care/Certification Expiration Date: (P) 17  No Show: (P) 1    Subjective:  Subjective: (P) No complaints from last visit     Pain:  Patient Currently in Pain: Yes    Objective:  Exercise 1: (P)  (VC's for breathing and abdominal bracing)  Exercise 3: (P) leg press machine 35# 2x15  Exercise 4: (P) gait training  Exercise 5: (P) step tap forward  Exercise 6: (P) high marching  Exercise 7: (P) squats  Exercise 8: (P) bridge [id number] hold  Exercise 9: (P) Hip abduction with band 2x15  Exercise 10: (P) SLR [id number] hold  Exercise 11: (P) supine SLR with abduction x10     Comment: Treatment focused on improving bilateral hip strength to reduce LE crossing midline and reverse trendelenburg during ambulation. VC's  To control movement and reduce momentum and use proper mm firing pattern. Patient compliant with HEP      Assessment:   Body structures, Functions, Activity limitations: Decreased functional mobility   Treatment Diagnosis: difficulty walking  REQUIRES PT FOLLOW UP: Yes  Activity Tolerance: Patient Tolerated treatment well       Plan:  Plan: (P) Continue with current plan       Therapy Time:2047-6134       Treatment Charges: Minutes Units   []  Ultrasound     []  Electrical-Stim     []  Iontophoresis     []  Traction     []  Massage       []  Eval     []  Gait     [x]  Ther Exercise 45  3   []  Manual Therapy       []  Ther Activities       []  Aquatics     []  Neuro Re-Ed       []  Other       Total Treatment Time: 39 3        Malena Walton, PT

## 2017-11-21 ENCOUNTER — HOSPITAL ENCOUNTER (OUTPATIENT)
Dept: PHYSICAL THERAPY | Age: 62
Setting detail: THERAPIES SERIES
Discharge: HOME | End: 2017-11-21
Payer: MEDICARE

## 2017-11-21 PROCEDURE — 97113 AQUATIC THERAPY/EXERCISES: CPT

## 2017-11-21 ASSESSMENT — PAIN DESCRIPTION - PAIN TYPE: TYPE: CHRONIC PAIN

## 2017-11-21 ASSESSMENT — PAIN SCALES - GENERAL: PAINLEVEL_OUTOF10: 3

## 2017-11-21 ASSESSMENT — PAIN DESCRIPTION - LOCATION: LOCATION: BACK

## 2017-11-21 ASSESSMENT — PAIN DESCRIPTION - DESCRIPTORS: DESCRIPTORS: ACHING;CONSTANT

## 2017-11-21 NOTE — PROGRESS NOTES
Physical Therapy  [address]   Outpatient Physical Therapy  [address] [facility]. [address]  Phone: [phone]  Fax: [phone]  Daily Progress Note    Date: 17    Patient Name: Narcisa Rizzo        MRN: [id number]  Account: [de-identified] : 1955      General Information:  Referring Practitioner: Jon Snider MD  Referral Date : 17  Diagnosis: Imbalance and coordination  Onset Date: 17  PT Insurance Information: Medicare Medicaid  Total # of Visits to Date: 5  No Show: 1    Subjective:  Subjective: Patient reports \"aches and pains\" after last visit. Pain:  Patient Currently in Pain: Yes  Pain Assessment: 0-10  Pain Level: 3  Pain Type: Chronic pain  Pain Location: Back  Pain Descriptors: Aching;Constant       Objective:  Exercise 1: ambulating over cushion  Exercise 3: leg press machine 35# 2x15  Exercise 4: gait training  Exercise 5: step tap forward  Exercise 7: squats  Exercise 8: bridge [id number] hold  Exercise 9: Hip abduction with band 2x15  Exercise 10: SLR [id number] hold  Exercise 11: supine SLR with abduction x10  Exercise 12: Sit to stands x15 reps no hands        Assessment:   Body structures, Functions, Activity limitations: Decreased functional mobility   Treatment Diagnosis: difficulty walking  REQUIRES PT FOLLOW UP: Yes  Activity Tolerance: Patient Tolerated treatment well    Plan:  Plan: Continue with current plan    Therapy Time:  Time In: 127  Time Out: 1327  Minutes: 42  Timed Code Treatment Minutes: 38 Minutes    Treatment Charges: Minutes Units   []  Ultrasound     []  Electrical-Stim     []  Iontophoresis     []  Traction     []  Massage       []  Eval     []  Gait     [x]  Ther Exercise  38 3    []  Manual Therapy       []  Ther Activities       []  Aquatics     []  Neuro Re-Ed       []  Other       Total Treatment Time: 45  3       Emerson Olson PTA

## 2017-11-28 ENCOUNTER — HOSPITAL ENCOUNTER (OUTPATIENT)
Dept: PHYSICAL THERAPY | Age: 62
Setting detail: THERAPIES SERIES
Discharge: HOME | End: 2017-11-28
Payer: MEDICARE

## 2017-11-28 PROCEDURE — G8979 MOBILITY GOAL STATUS: HCPCS

## 2017-11-28 PROCEDURE — G8978 MOBILITY CURRENT STATUS: HCPCS

## 2017-11-28 PROCEDURE — 97110 THERAPEUTIC EXERCISES: CPT

## 2017-11-28 ASSESSMENT — PAIN DESCRIPTION - ORIENTATION: ORIENTATION: RIGHT

## 2017-11-28 ASSESSMENT — PAIN DESCRIPTION - LOCATION: LOCATION: LEG;HIP;BACK

## 2017-11-28 ASSESSMENT — PAIN SCALES - GENERAL: PAINLEVEL_OUTOF10: 7

## 2017-11-28 ASSESSMENT — PAIN DESCRIPTION - PAIN TYPE: TYPE: CHRONIC PAIN

## 2017-11-28 NOTE — PROGRESS NOTES
Electronically signed by: Jennie Barrios, [address] @ [place] [address] [address]  Phone [phone]  Fax [phone]

## 2017-11-30 ENCOUNTER — HOSPITAL ENCOUNTER (OUTPATIENT)
Dept: PHYSICAL THERAPY | Age: 62
Setting detail: THERAPIES SERIES
Discharge: HOME | End: 2017-11-30
Payer: MEDICARE

## 2017-11-30 PROCEDURE — 97116 GAIT TRAINING THERAPY: CPT

## 2017-11-30 PROCEDURE — 97110 THERAPEUTIC EXERCISES: CPT

## 2017-11-30 ASSESSMENT — PAIN DESCRIPTION - LOCATION: LOCATION: BACK;LEG;HIP

## 2017-11-30 ASSESSMENT — PAIN DESCRIPTION - ORIENTATION: ORIENTATION: RIGHT

## 2017-11-30 ASSESSMENT — PAIN DESCRIPTION - PAIN TYPE: TYPE: CHRONIC PAIN

## 2017-11-30 ASSESSMENT — PAIN SCALES - GENERAL: PAINLEVEL_OUTOF10: 5

## 2017-11-30 NOTE — PROGRESS NOTES
Therapy       []  Ther Activities       []  Aquatics     []  Neuro Re-Ed       []  Other       Total Treatment Time: 40 3        Tilford Najjar, PTA

## 2017-12-05 ENCOUNTER — HOSPITAL ENCOUNTER (OUTPATIENT)
Dept: PHYSICAL THERAPY | Age: 62
Setting detail: THERAPIES SERIES
Discharge: HOME | End: 2017-12-05
Payer: MEDICARE

## 2017-12-05 ENCOUNTER — HOSPITAL ENCOUNTER (OUTPATIENT)
Age: 62
Setting detail: SPECIMEN
Discharge: HOME | End: 2017-12-05
Payer: MEDICARE

## 2017-12-05 LAB
ABSOLUTE EOS #: 0.23 K/UL (ref 0–0.44)
ABSOLUTE IMMATURE GRANULOCYTE: 0.03 K/UL (ref 0–0.3)
ABSOLUTE LYMPH #: 2.4 K/UL (ref 1.1–3.7)
ABSOLUTE MONO #: 0.65 K/UL (ref 0.1–1.2)
ALBUMIN SERPL-MCNC: 4.2 G/DL (ref 3.5–5.2)
ALBUMIN/GLOBULIN RATIO: 1.2 (ref 1–2.5)
ALP BLD-CCNC: 78 U/L (ref 35–104)
ALT SERPL-CCNC: 98 U/L (ref 5–33)
ANION GAP SERPL CALCULATED.3IONS-SCNC: 13 MMOL/L (ref 9–17)
AST SERPL-CCNC: 107 U/L
BASOPHILS # BLD: 0 % (ref 0–2)
BASOPHILS ABSOLUTE: 0.03 K/UL (ref 0–0.2)
BILIRUB SERPL-MCNC: 0.44 MG/DL (ref 0.3–1.2)
BILIRUBIN URINE: NEGATIVE
BUN BLDV-MCNC: 17 MG/DL (ref 8–23)
BUN/CREAT BLD: ABNORMAL (ref 9–20)
CALCIUM SERPL-MCNC: 9.9 MG/DL (ref 8.6–10.4)
CHLORIDE BLD-SCNC: 100 MMOL/L (ref 98–107)
CHOLESTEROL/HDL RATIO: 3
CHOLESTEROL: 216 MG/DL
CO2: 26 MMOL/L (ref 20–31)
COLOR: YELLOW
COMMENT UA: ABNORMAL
CREAT SERPL-MCNC: 0.82 MG/DL (ref 0.5–0.9)
CREATININE URINE: 60.8 MG/DL (ref 28–217)
DIFFERENTIAL TYPE: ABNORMAL
EOSINOPHILS RELATIVE PERCENT: 3 % (ref 1–4)
ESTIMATED AVERAGE GLUCOSE: 183 MG/DL
GFR AFRICAN AMERICAN: >60 ML/MIN
GFR NON-AFRICAN AMERICAN: >60 ML/MIN
GFR SERPL CREATININE-BSD FRML MDRD: ABNORMAL ML/MIN/{1.73_M2}
GFR SERPL CREATININE-BSD FRML MDRD: ABNORMAL ML/MIN/{1.73_M2}
GLUCOSE BLD-MCNC: 120 MG/DL (ref 70–99)
GLUCOSE URINE: ABNORMAL
HBA1C MFR BLD: 8 % (ref 4–6)
HCT VFR BLD CALC: 43.2 % (ref 36.3–47.1)
HDLC SERPL-MCNC: 71 MG/DL
HEMOGLOBIN: 12.9 G/DL (ref 11.9–15.1)
IMMATURE GRANULOCYTES: 0 %
KETONES, URINE: NEGATIVE
LDL CHOLESTEROL: 123 MG/DL (ref 0–130)
LEUKOCYTE ESTERASE, URINE: NEGATIVE
LYMPHOCYTES # BLD: 28 % (ref 24–43)
MCH RBC QN AUTO: 25.7 PG (ref 25.2–33.5)
MCHC RBC AUTO-ENTMCNC: 29.9 G/DL (ref 28.4–34.8)
MCV RBC AUTO: 86.2 FL (ref 82.6–102.9)
MICROALBUMIN/CREAT 24H UR: <12 MG/L
MICROALBUMIN/CREAT UR-RTO: NORMAL MCG/MG CREAT
MONOCYTES # BLD: 8 % (ref 3–12)
NITRITE, URINE: NEGATIVE
PDW BLD-RTO: 15 % (ref 11.8–14.4)
PH UA: 5 (ref 5–8)
PLATELET # BLD: 287 K/UL (ref 138–453)
PLATELET ESTIMATE: ABNORMAL
PMV BLD AUTO: 10.4 FL (ref 8.1–13.5)
POTASSIUM SERPL-SCNC: 4.7 MMOL/L (ref 3.7–5.3)
PROTEIN UA: NEGATIVE
RBC # BLD: 5.01 M/UL (ref 3.95–5.11)
RBC # BLD: ABNORMAL 10*6/UL
SEG NEUTROPHILS: 61 % (ref 36–65)
SEGMENTED NEUTROPHILS ABSOLUTE COUNT: 5.32 K/UL (ref 1.5–8.1)
SODIUM BLD-SCNC: 139 MMOL/L (ref 135–144)
SPECIFIC GRAVITY UA: 1.02 (ref 1–1.03)
THYROXINE, FREE: 1.12 NG/DL (ref 0.93–1.7)
TOTAL PROTEIN: 7.7 G/DL (ref 6.4–8.3)
TRIGL SERPL-MCNC: 111 MG/DL
TSH SERPL DL<=0.05 MIU/L-ACNC: 0.82 MIU/L (ref 0.3–5)
TURBIDITY: CLEAR
URINE HGB: NEGATIVE
UROBILINOGEN, URINE: NORMAL
VITAMIN D 25-HYDROXY: 57.2 NG/ML (ref 30–100)
VLDLC SERPL CALC-MCNC: ABNORMAL MG/DL (ref 1–30)
WBC # BLD: 8.7 K/UL (ref 3.5–11.3)
WBC # BLD: ABNORMAL 10*3/UL

## 2017-12-05 PROCEDURE — 97110 THERAPEUTIC EXERCISES: CPT

## 2017-12-05 ASSESSMENT — PAIN SCALES - GENERAL: PAINLEVEL_OUTOF10: 5

## 2017-12-05 ASSESSMENT — PAIN DESCRIPTION - PAIN TYPE: TYPE: CHRONIC PAIN

## 2017-12-07 ASSESSMENT — PAIN DESCRIPTION - PAIN TYPE: TYPE: CHRONIC PAIN

## 2017-12-07 ASSESSMENT — PAIN SCALES - GENERAL: PAINLEVEL_OUTOF10: 5

## 2017-12-07 ASSESSMENT — PAIN DESCRIPTION - LOCATION: LOCATION: BACK;HIP

## 2017-12-12 ENCOUNTER — HOSPITAL ENCOUNTER (OUTPATIENT)
Dept: PHYSICAL THERAPY | Age: 62
Setting detail: THERAPIES SERIES
Discharge: HOME | End: 2017-12-12
Payer: MEDICARE

## 2017-12-12 PROCEDURE — 97116 GAIT TRAINING THERAPY: CPT

## 2017-12-12 PROCEDURE — 97110 THERAPEUTIC EXERCISES: CPT

## 2017-12-12 ASSESSMENT — PAIN DESCRIPTION - ORIENTATION: ORIENTATION: RIGHT;LOWER

## 2017-12-12 ASSESSMENT — PAIN DESCRIPTION - LOCATION: LOCATION: BACK;HIP

## 2017-12-12 ASSESSMENT — PAIN DESCRIPTION - PAIN TYPE: TYPE: CHRONIC PAIN

## 2017-12-12 ASSESSMENT — PAIN SCALES - GENERAL: PAINLEVEL_OUTOF10: 7

## 2017-12-12 NOTE — PROGRESS NOTES
Physical Therapy  [address]   Outpatient Physical Therapy  [address] [facility]. [address]  Phone: [phone]  Fax: [phone]  Daily Progress Note    Date: 17    Patient Name: Tor Morgan        MRN: [id number]  Account: [de-identified] : 1955      General Information:  Referring Practitioner: Wayne Landis MD  Referral Date : 17  Diagnosis: Imbalance and coordination  Onset Date: 17  PT Insurance Information: Medicare Medicaid  Total # of Visits to Date: 9  Plan of Care/Certification Expiration Date: 17    Subjective:  Subjective: Pt reports still being sore from falling over a week ago. Pain:  Patient Currently in Pain: Yes  Pain Assessment: 0-10  Pain Level: 7  Pain Type: Chronic pain  Pain Location: Back; Hip  Pain Orientation: Right; Lower       Objective:  Exercise 1: SLR supine lower and lift  Exercise 2: supine hip abduction [id number] with and without blue band  Exercise 4: gait training with SPC  Exercise 5: step tap forward standing on cushion  Exercise 8: bridge with ball knees bent  Exercise 12: Sit to stands standing on cushion  x15 reps no hands  Exercise 13: single lE standing x45sec hold  EC 3xea LE  Exercise 16: 3-way hip (B) LE x10 reps on blue foam  Exercise 18: dynamic balance challenges with head movements, stepping over objects, speed changes  Exercise 20: side stepping over cones        Assessment:   Body structures, Functions, Activity limitations: Decreased functional mobility   Treatment Diagnosis: difficulty walking  Prognosis: Good  REQUIRES PT FOLLOW UP: Yes  Activity Tolerance: Patient Tolerated treatment well    Plan:  Plan: Continue with current plan    Therapy Time:  Time In: 1245  Time Out: 1330  Minutes: 45       Treatment Charges: Minutes Units   []  Ultrasound     []  Electrical-Stim     []  Iontophoresis     []  Traction     []  Massage       []  Eval     [x]  Gait 15 1   [x]  Ther Exercise  30 2    []  Manual Therapy       []

## 2017-12-14 ENCOUNTER — HOSPITAL ENCOUNTER (OUTPATIENT)
Dept: PHYSICAL THERAPY | Age: 62
Setting detail: THERAPIES SERIES
Discharge: HOME | End: 2017-12-14
Payer: MEDICARE

## 2017-12-14 NOTE — PROGRESS NOTES
[address]   Outpatient Physical Therapy  Cancel/No Show Note    Date: 17    Patient Name: Fly Gomez        MRN: [id number]  Account: [de-identified] : 1955      General Information:  Referring Practitioner: (P) Trevon Kendrick MD  Referral Date : (P) 17  Diagnosis: (P) Imbalance and coordination  Onset Date: (P) 17  PT Insurance Information: (P) Medicare Medicaid  Total # of Visits to Date: (P) 9  Plan of Care/Certification Expiration Date: (P) 17  Canceled Appointment: (P) 1 (Patient called and stated she was ill)           Francy Jasmine, PT

## 2017-12-21 ENCOUNTER — HOSPITAL ENCOUNTER (OUTPATIENT)
Dept: PHYSICAL THERAPY | Age: 62
Setting detail: THERAPIES SERIES
Discharge: HOME | End: 2017-12-21
Payer: MEDICARE

## 2017-12-21 PROCEDURE — 97110 THERAPEUTIC EXERCISES: CPT

## 2017-12-21 ASSESSMENT — PAIN DESCRIPTION - LOCATION: LOCATION: BACK;HIP

## 2017-12-21 ASSESSMENT — PAIN DESCRIPTION - PAIN TYPE: TYPE: CHRONIC PAIN

## 2017-12-21 ASSESSMENT — PAIN SCALES - GENERAL: PAINLEVEL_OUTOF10: 4

## 2017-12-21 ASSESSMENT — PAIN DESCRIPTION - ORIENTATION: ORIENTATION: LOWER;MID

## 2017-12-21 NOTE — PROGRESS NOTES
[address]   Outpatient Physical Therapy  [address]  Phone: [phone]  Fax: [phone]    Daily Progress Note    Date: 17    Patient Name: Laurie Rose        MRN: [id number]  Account: [de-identified] : 1955      General Information:  Referring Practitioner: Mya Armando MD  Referral Date : 17  Diagnosis: Imbalance and coordination  Onset Date: 17  PT Insurance Information: Medicare Medicaid  Total # of Visits to Date: 10  Plan of Care/Certification Expiration Date: 17  Canceled Appointment: 1    Subjective:  Subjective:  Patient reports that she feels an improvement in function. She was able to take trash out yesterday and was able to go up and down steps without holding on  Step over step    Pain:  Patient Currently in Pain: Yes  Pain Assessment: 0-10  Pain Level: 4  Pain Type: Chronic pain  Pain Location: Back; Hip  Pain Orientation: Lower;Mid       Objective:  Exercise 1: SLR supine lower and lift  Exercise 2: supine hip abduction [id number] with and without blue band  Exercise 3: supine abduction black band  Exercise 5: dips 4.5in step  Exercise 6: step ups 6.5in step  Exercise 7: marching on cushion EO EC  Exercise 8: bridge with ball knees bent  Exercise 9: bridge with ball knees bent  Exercise 12: Sit to stands standing on cushion  x15 reps no hands  Exercise 13: single lE standing x45sec hold  EC 3xea LE  Exercise 16: 3-way hip (B) LE x10 reps on blue foam  Exercise 17: CC Ambulation  Exercise 18: dynamic balance challenges with head movements, stepping over objects, speed changes    Comment: CGA for dynamic balance activities and VC's for tech       Assessment:   Body structures, Functions, Activity limitations: Decreased functional mobility   Treatment Diagnosis: difficulty walking  REQUIRES PT FOLLOW UP: Yes  Activity Tolerance: Patient Tolerated treatment well     Plan:  Plan: Continue with current plan       Therapy Time:5928-6638

## 2017-12-22 ENCOUNTER — HOSPITAL ENCOUNTER (OUTPATIENT)
Dept: PHYSICAL THERAPY | Age: 62
Setting detail: THERAPIES SERIES
Discharge: HOME | End: 2017-12-22
Payer: MEDICARE

## 2017-12-22 PROCEDURE — 97110 THERAPEUTIC EXERCISES: CPT

## 2017-12-22 ASSESSMENT — PAIN SCALES - GENERAL: PAINLEVEL_OUTOF10: 6

## 2017-12-22 ASSESSMENT — PAIN DESCRIPTION - LOCATION: LOCATION: BACK;NECK

## 2017-12-22 ASSESSMENT — PAIN DESCRIPTION - PAIN TYPE: TYPE: CHRONIC PAIN

## 2017-12-22 NOTE — PROGRESS NOTES
Gait     [x]  Ther Exercise 45  3    []  Manual Therapy       []  Ther Activities       []  Aquatics     []  Neuro Re-Ed       []  Other       Total Treatment Time: 39 3        Vega Hunt, PT

## 2017-12-28 ENCOUNTER — APPOINTMENT (OUTPATIENT)
Dept: PHYSICAL THERAPY | Age: 62
End: 2017-12-28
Payer: MEDICARE

## 2018-01-02 ENCOUNTER — HOSPITAL ENCOUNTER (OUTPATIENT)
Dept: PHYSICAL THERAPY | Age: 63
Setting detail: THERAPIES SERIES
Discharge: HOME | End: 2018-01-02
Payer: MEDICARE

## 2018-01-02 PROCEDURE — G8979 MOBILITY GOAL STATUS: HCPCS

## 2018-01-02 PROCEDURE — G8980 MOBILITY D/C STATUS: HCPCS

## 2018-01-02 PROCEDURE — 97110 THERAPEUTIC EXERCISES: CPT

## 2018-01-03 ASSESSMENT — PAIN DESCRIPTION - PAIN TYPE: TYPE: CHRONIC PAIN

## 2018-01-03 ASSESSMENT — PAIN SCALES - GENERAL: PAINLEVEL_OUTOF10: 6

## 2018-01-03 ASSESSMENT — PAIN DESCRIPTION - LOCATION: LOCATION: BACK

## 2018-01-03 NOTE — PROGRESS NOTES
Physical Therapy  Discharge Assessment  Date: 2018  Patient Name: Letitia Jiménez  MRN: [id number]  : 1955     Treatment Diagnosis: difficulty walking      Subjective   General  Referring Practitioner: George Delgado MD  Referral Date : 17  Diagnosis: Imbalance and coordination    PT Visit Information  Onset Date: 17  PT Insurance Information: Medicare Medicaid  Total # of Visits to Date: 12  Plan of Care/Certification Expiration Date: 17  No Show: 1  Canceled Appointment: 1    General Comment  Comments: Patient came walking into therapy with increased shuffling gait and instability with turns. Movements appear to require increased effort to complete    Subjective  Subjective: Patient reports not feeling well and increased weakness. She states she had a near fall since last visit. No specific reason for increased feeling of weakness    Pain Screening  Patient Currently in Pain: Yes  Pain Assessment  Pain Assessment: 0-10  Pain Level: 6  Pain Type: Chronic pain  Pain Location: Back  Vital Signs  Patient Currently in Pain: Yes    Objective  Exercises  Exercise 1: Reviewed HEP  Exercise 2: gait training activities with speed changes and head turns  Exercise 20:  stepping over cones     OutComes Score  Dynamic Gait Total Score: 18    Assessment   Conditions Requiring Skilled Therapeutic Intervention  Body structures, Functions, Activity limitations: Decreased functional mobility   Assessment:Treatment Diagnosis: difficulty walking  REQUIRES PT FOLLOW UP:   Activity Tolerance  Activity Tolerance: Patient Tolerated treatment well      Plan : Discharge PT at this time.  Patient to continue with HEP and should follow up with her physician    Goals  Short term goals  Time Frame for Short term goals: 10 visits  Short term goal 1: Dynamic gait index --ON GOING  Short term goal 2: Ambulate with heel toe gait--ON GOING     Therapy Time   Individual Concurrent Group Co-treatment   Time In 0230

## 2018-01-27 ENCOUNTER — HOSPITAL ENCOUNTER (OUTPATIENT)
Age: 63
Discharge: HOME | End: 2018-01-27
Payer: MEDICARE

## 2018-01-27 LAB
ABSOLUTE EOS #: 0.2 K/UL (ref 0–0.4)
ABSOLUTE IMMATURE GRANULOCYTE: ABNORMAL K/UL (ref 0–0.3)
ABSOLUTE LYMPH #: 2.3 K/UL (ref 1–4.8)
ABSOLUTE MONO #: 0.6 K/UL (ref 0.1–1.3)
ALBUMIN SERPL-MCNC: 4.1 G/DL (ref 3.5–5.2)
ALBUMIN/GLOBULIN RATIO: ABNORMAL (ref 1–2.5)
ALP BLD-CCNC: 77 U/L (ref 35–104)
ALT SERPL-CCNC: 89 U/L (ref 5–33)
ANION GAP SERPL CALCULATED.3IONS-SCNC: 13 MMOL/L (ref 9–17)
AST SERPL-CCNC: 118 U/L
BASOPHILS # BLD: 1 % (ref 0–2)
BASOPHILS ABSOLUTE: 0 K/UL (ref 0–0.2)
BILIRUB SERPL-MCNC: 0.49 MG/DL (ref 0.3–1.2)
BILIRUBIN URINE: NEGATIVE
BUN BLDV-MCNC: 21 MG/DL (ref 8–23)
BUN/CREAT BLD: ABNORMAL (ref 9–20)
CALCIUM SERPL-MCNC: 9.9 MG/DL (ref 8.6–10.4)
CHLORIDE BLD-SCNC: 102 MMOL/L (ref 98–107)
CHOLESTEROL/HDL RATIO: 2.6
CHOLESTEROL: 170 MG/DL
CO2: 26 MMOL/L (ref 20–31)
COLOR: YELLOW
COMMENT UA: ABNORMAL
CREAT SERPL-MCNC: 0.98 MG/DL (ref 0.5–0.9)
CREATININE URINE: 33.6 MG/DL (ref 28–217)
DIFFERENTIAL TYPE: ABNORMAL
EOSINOPHILS RELATIVE PERCENT: 3 % (ref 0–4)
GFR AFRICAN AMERICAN: >60 ML/MIN
GFR NON-AFRICAN AMERICAN: 58 ML/MIN
GFR SERPL CREATININE-BSD FRML MDRD: ABNORMAL ML/MIN/{1.73_M2}
GFR SERPL CREATININE-BSD FRML MDRD: ABNORMAL ML/MIN/{1.73_M2}
GLUCOSE BLD-MCNC: 99 MG/DL (ref 70–99)
GLUCOSE URINE: ABNORMAL
HCT VFR BLD CALC: 37.6 % (ref 36–46)
HDLC SERPL-MCNC: 65 MG/DL
HEMOGLOBIN: 12.4 G/DL (ref 12–16)
IMMATURE GRANULOCYTES: ABNORMAL %
KETONES, URINE: NEGATIVE
LDL CHOLESTEROL: 79 MG/DL (ref 0–130)
LEUKOCYTE ESTERASE, URINE: NEGATIVE
LYMPHOCYTES # BLD: 26 % (ref 24–44)
MCH RBC QN AUTO: 27.1 PG (ref 26–34)
MCHC RBC AUTO-ENTMCNC: 33 G/DL (ref 31–37)
MCV RBC AUTO: 81.9 FL (ref 80–100)
MICROALBUMIN/CREAT 24H UR: 13 MG/L
MICROALBUMIN/CREAT UR-RTO: 39 MCG/MG CREAT
MONOCYTES # BLD: 7 % (ref 1–7)
NITRITE, URINE: NEGATIVE
NRBC AUTOMATED: ABNORMAL PER 100 WBC
PDW BLD-RTO: 15.2 % (ref 11.5–14.9)
PH UA: 6 (ref 5–8)
PLATELET # BLD: 267 K/UL (ref 150–450)
PLATELET ESTIMATE: ABNORMAL
PMV BLD AUTO: 8.3 FL (ref 6–12)
POTASSIUM SERPL-SCNC: 5 MMOL/L (ref 3.7–5.3)
PROTEIN UA: NEGATIVE
RBC # BLD: 4.59 M/UL (ref 4–5.2)
RBC # BLD: ABNORMAL 10*6/UL
SEG NEUTROPHILS: 63 % (ref 36–66)
SEGMENTED NEUTROPHILS ABSOLUTE COUNT: 5.7 K/UL (ref 1.3–9.1)
SODIUM BLD-SCNC: 141 MMOL/L (ref 135–144)
SPECIFIC GRAVITY UA: 1.01 (ref 1–1.03)
THYROXINE, FREE: 1.1 NG/DL (ref 0.93–1.7)
TOTAL PROTEIN: 7.8 G/DL (ref 6.4–8.3)
TRIGL SERPL-MCNC: 128 MG/DL
TSH SERPL DL<=0.05 MIU/L-ACNC: 1.07 MIU/L (ref 0.3–5)
TURBIDITY: CLEAR
URINE HGB: NEGATIVE
UROBILINOGEN, URINE: NORMAL
VITAMIN D 25-HYDROXY: 57.4 NG/ML (ref 30–100)
VLDLC SERPL CALC-MCNC: NORMAL MG/DL (ref 1–30)
WBC # BLD: 8.9 K/UL (ref 3.5–11)
WBC # BLD: ABNORMAL 10*3/UL

## 2018-01-27 PROCEDURE — 84439 ASSAY OF FREE THYROXINE: CPT

## 2018-01-27 PROCEDURE — 82043 UR ALBUMIN QUANTITATIVE: CPT

## 2018-01-27 PROCEDURE — 82570 ASSAY OF URINE CREATININE: CPT

## 2018-01-27 PROCEDURE — 36415 COLL VENOUS BLD VENIPUNCTURE: CPT

## 2018-01-27 PROCEDURE — 82306 VITAMIN D 25 HYDROXY: CPT

## 2018-01-27 PROCEDURE — 83036 HEMOGLOBIN GLYCOSYLATED A1C: CPT

## 2018-01-27 PROCEDURE — 81003 URINALYSIS AUTO W/O SCOPE: CPT

## 2018-01-27 PROCEDURE — 80061 LIPID PANEL: CPT

## 2018-01-27 PROCEDURE — 84443 ASSAY THYROID STIM HORMONE: CPT

## 2018-01-27 PROCEDURE — 80053 COMPREHEN METABOLIC PANEL: CPT

## 2018-01-27 PROCEDURE — 85025 COMPLETE CBC W/AUTO DIFF WBC: CPT

## 2018-01-28 LAB
ESTIMATED AVERAGE GLUCOSE: 189 MG/DL
HBA1C MFR BLD: 8.2 % (ref 4–6)

## 2018-02-16 ENCOUNTER — HOSPITAL ENCOUNTER (OUTPATIENT)
Dept: NON INVASIVE DIAGNOSTICS | Age: 63
Discharge: HOME | End: 2018-02-16
Payer: MEDICARE

## 2018-02-16 LAB
LV EF: 55 %
LVEF MODALITY: NORMAL

## 2018-02-16 PROCEDURE — 93306 TTE W/DOPPLER COMPLETE: CPT

## 2018-05-15 ENCOUNTER — HOSPITAL ENCOUNTER (OUTPATIENT)
Dept: NUCLEAR MEDICINE | Age: 63
Discharge: HOME | End: 2018-05-17
Payer: MEDICARE

## 2018-05-15 ENCOUNTER — HOSPITAL ENCOUNTER (OUTPATIENT)
Dept: NON INVASIVE DIAGNOSTICS | Age: 63
Discharge: HOME | End: 2018-05-15
Payer: MEDICARE

## 2018-05-15 DIAGNOSIS — I49.3 VENTRICULAR PREMATURE DEPOLARIZATION: ICD-10-CM

## 2018-05-15 DIAGNOSIS — R07.2 PRECORDIAL PAIN: ICD-10-CM

## 2018-05-15 LAB
LV EF: 68 %
LVEF MODALITY: NORMAL

## 2018-05-15 PROCEDURE — 78452 HT MUSCLE IMAGE SPECT MULT: CPT

## 2018-05-15 PROCEDURE — 93017 CV STRESS TEST TRACING ONLY: CPT

## 2018-05-15 PROCEDURE — A9500 TC99M SESTAMIBI: HCPCS | Performed by: INTERNAL MEDICINE

## 2018-05-15 PROCEDURE — 3430000000 HC RX DIAGNOSTIC RADIOPHARMACEUTICAL: Performed by: INTERNAL MEDICINE

## 2018-05-15 PROCEDURE — 2580000003 HC RX 258: Performed by: INTERNAL MEDICINE

## 2018-05-15 PROCEDURE — 6360000002 HC RX W HCPCS: Performed by: INTERNAL MEDICINE

## 2018-05-15 RX ORDER — AMINOPHYLLINE DIHYDRATE 25 MG/ML
100 INJECTION, SOLUTION INTRAVENOUS
Status: ACTIVE | OUTPATIENT
Start: 2018-05-15 | End: 2018-05-15

## 2018-05-15 RX ORDER — NITROGLYCERIN 0.4 MG/1
0.4 TABLET SUBLINGUAL EVERY 5 MIN PRN
Status: ACTIVE | OUTPATIENT
Start: 2018-05-15 | End: 2018-05-16

## 2018-05-15 RX ORDER — SODIUM CHLORIDE 0.9 % (FLUSH) 0.9 %
10 SYRINGE (ML) INJECTION PRN
Status: ACTIVE | OUTPATIENT
Start: 2018-05-15 | End: 2018-05-16

## 2018-05-15 RX ORDER — METOPROLOL TARTRATE 5 MG/5ML
2.5 INJECTION INTRAVENOUS PRN
Status: ACTIVE | OUTPATIENT
Start: 2018-05-15 | End: 2018-05-16

## 2018-05-15 RX ORDER — 0.9 % SODIUM CHLORIDE 0.9 %
250 INTRAVENOUS SOLUTION INTRAVENOUS ONCE
Status: DISCONTINUED | OUTPATIENT
Start: 2018-05-15 | End: 2018-05-18 | Stop reason: HOSPADM

## 2018-05-15 RX ORDER — SODIUM CHLORIDE 0.9 % (FLUSH) 0.9 %
10 SYRINGE (ML) INJECTION PRN
Status: DISCONTINUED | OUTPATIENT
Start: 2018-05-15 | End: 2018-05-18 | Stop reason: HOSPADM

## 2018-05-15 RX ADMIN — Medication 10 ML: at 08:34

## 2018-05-15 RX ADMIN — Medication 10 ML: at 09:06

## 2018-05-15 RX ADMIN — TETRAKIS(2-METHOXYISOBUTYLISOCYANIDE)COPPER(I) TETRAFLUOROBORATE 34 MILLICURIE: 1 INJECTION, POWDER, LYOPHILIZED, FOR SOLUTION INTRAVENOUS at 09:09

## 2018-05-15 RX ADMIN — TETRAKIS(2-METHOXYISOBUTYLISOCYANIDE)COPPER(I) TETRAFLUOROBORATE 11.3 MILLICURIE: 1 INJECTION, POWDER, LYOPHILIZED, FOR SOLUTION INTRAVENOUS at 07:09

## 2018-05-15 RX ADMIN — Medication 10 ML: at 07:09

## 2018-05-15 RX ADMIN — REGADENOSON 0.4 MG: 0.08 INJECTION, SOLUTION INTRAVENOUS at 09:06

## 2018-06-25 ENCOUNTER — HOSPITAL ENCOUNTER (OUTPATIENT)
Dept: CARDIAC REHAB | Age: 63
Setting detail: THERAPIES SERIES
Discharge: HOME | End: 2018-06-25
Payer: MEDICARE

## 2018-06-25 VITALS — HEIGHT: 65 IN | WEIGHT: 204.8 LBS | BODY MASS INDEX: 34.12 KG/M2

## 2018-06-25 PROCEDURE — 93798 PHYS/QHP OP CAR RHAB W/ECG: CPT

## 2018-06-25 RX ORDER — INSULIN GLARGINE 100 [IU]/ML
45 INJECTION, SOLUTION SUBCUTANEOUS NIGHTLY
COMMUNITY
End: 2020-08-19 | Stop reason: SDUPTHER

## 2018-06-25 ASSESSMENT — PATIENT HEALTH QUESTIONNAIRE - PHQ9: SUM OF ALL RESPONSES TO PHQ QUESTIONS 1-9: 0

## 2018-06-27 ENCOUNTER — HOSPITAL ENCOUNTER (OUTPATIENT)
Dept: CARDIAC REHAB | Age: 63
Setting detail: THERAPIES SERIES
Discharge: HOME | End: 2018-06-27
Payer: MEDICARE

## 2018-06-27 VITALS — WEIGHT: 205 LBS | BODY MASS INDEX: 34.11 KG/M2

## 2018-06-27 PROCEDURE — 93798 PHYS/QHP OP CAR RHAB W/ECG: CPT

## 2018-06-27 RX ORDER — ACETAMINOPHEN 160 MG
TABLET,DISINTEGRATING ORAL DAILY
COMMUNITY

## 2018-06-29 ENCOUNTER — HOSPITAL ENCOUNTER (OUTPATIENT)
Dept: CARDIAC REHAB | Age: 63
Setting detail: THERAPIES SERIES
Discharge: HOME | End: 2018-06-29
Payer: MEDICARE

## 2018-06-29 VITALS — WEIGHT: 203.9 LBS | BODY MASS INDEX: 33.93 KG/M2

## 2018-06-29 PROCEDURE — 93798 PHYS/QHP OP CAR RHAB W/ECG: CPT

## 2018-07-02 ENCOUNTER — HOSPITAL ENCOUNTER (OUTPATIENT)
Dept: CARDIAC REHAB | Age: 63
Setting detail: THERAPIES SERIES
Discharge: HOME | End: 2018-07-02
Payer: MEDICARE

## 2018-07-02 VITALS — WEIGHT: 207.4 LBS | BODY MASS INDEX: 34.51 KG/M2

## 2018-07-02 PROCEDURE — 93798 PHYS/QHP OP CAR RHAB W/ECG: CPT

## 2018-07-04 ENCOUNTER — APPOINTMENT (OUTPATIENT)
Dept: CARDIAC REHAB | Age: 63
End: 2018-07-04
Payer: MEDICARE

## 2018-07-06 ENCOUNTER — HOSPITAL ENCOUNTER (OUTPATIENT)
Dept: CARDIAC REHAB | Age: 63
Setting detail: THERAPIES SERIES
Discharge: HOME | End: 2018-07-06
Payer: MEDICARE

## 2018-07-06 VITALS — WEIGHT: 203.6 LBS | BODY MASS INDEX: 33.88 KG/M2

## 2018-07-06 PROCEDURE — 93798 PHYS/QHP OP CAR RHAB W/ECG: CPT

## 2018-07-09 ENCOUNTER — HOSPITAL ENCOUNTER (OUTPATIENT)
Dept: CARDIAC REHAB | Age: 63
Setting detail: THERAPIES SERIES
Discharge: HOME | End: 2018-07-09
Payer: MEDICARE

## 2018-07-09 VITALS — BODY MASS INDEX: 34.45 KG/M2 | WEIGHT: 207 LBS

## 2018-07-09 PROCEDURE — 93798 PHYS/QHP OP CAR RHAB W/ECG: CPT

## 2018-07-11 ENCOUNTER — HOSPITAL ENCOUNTER (OUTPATIENT)
Dept: CARDIAC REHAB | Age: 63
Setting detail: THERAPIES SERIES
Discharge: HOME | End: 2018-07-11
Payer: MEDICARE

## 2018-07-11 VITALS — BODY MASS INDEX: 34.21 KG/M2 | WEIGHT: 205.6 LBS

## 2018-07-11 PROCEDURE — 93798 PHYS/QHP OP CAR RHAB W/ECG: CPT

## 2018-07-13 ENCOUNTER — HOSPITAL ENCOUNTER (OUTPATIENT)
Dept: CARDIAC REHAB | Age: 63
Setting detail: THERAPIES SERIES
Discharge: HOME | End: 2018-07-13
Payer: MEDICARE

## 2018-07-13 VITALS — BODY MASS INDEX: 34.26 KG/M2 | WEIGHT: 205.9 LBS

## 2018-07-13 PROCEDURE — 93798 PHYS/QHP OP CAR RHAB W/ECG: CPT

## 2018-07-16 ENCOUNTER — HOSPITAL ENCOUNTER (OUTPATIENT)
Dept: CARDIAC REHAB | Age: 63
Setting detail: THERAPIES SERIES
Discharge: HOME | End: 2018-07-16
Payer: MEDICARE

## 2018-07-16 VITALS — WEIGHT: 206 LBS | BODY MASS INDEX: 34.28 KG/M2

## 2018-07-16 PROCEDURE — 93798 PHYS/QHP OP CAR RHAB W/ECG: CPT

## 2018-07-18 ENCOUNTER — HOSPITAL ENCOUNTER (OUTPATIENT)
Dept: CARDIAC REHAB | Age: 63
Setting detail: THERAPIES SERIES
Discharge: HOME | End: 2018-07-18
Payer: MEDICARE

## 2018-07-18 VITALS — BODY MASS INDEX: 34.61 KG/M2 | WEIGHT: 208 LBS

## 2018-07-18 PROCEDURE — 93798 PHYS/QHP OP CAR RHAB W/ECG: CPT

## 2018-07-23 ENCOUNTER — HOSPITAL ENCOUNTER (OUTPATIENT)
Dept: CARDIAC REHAB | Age: 63
Setting detail: THERAPIES SERIES
Discharge: HOME | End: 2018-07-23
Payer: MEDICARE

## 2018-07-23 VITALS — WEIGHT: 208.9 LBS | BODY MASS INDEX: 34.76 KG/M2

## 2018-07-23 PROCEDURE — 93798 PHYS/QHP OP CAR RHAB W/ECG: CPT

## 2018-07-25 ENCOUNTER — HOSPITAL ENCOUNTER (OUTPATIENT)
Dept: CARDIAC REHAB | Age: 63
Setting detail: THERAPIES SERIES
Discharge: HOME | End: 2018-07-25
Payer: MEDICARE

## 2018-07-25 VITALS — WEIGHT: 206 LBS | BODY MASS INDEX: 34.32 KG/M2 | HEIGHT: 65 IN

## 2018-07-25 PROCEDURE — 93798 PHYS/QHP OP CAR RHAB W/ECG: CPT

## 2018-07-27 ENCOUNTER — HOSPITAL ENCOUNTER (OUTPATIENT)
Dept: CARDIAC REHAB | Age: 63
Setting detail: THERAPIES SERIES
Discharge: HOME | End: 2018-07-27
Payer: MEDICARE

## 2018-07-27 VITALS — WEIGHT: 207.3 LBS | BODY MASS INDEX: 34.5 KG/M2

## 2018-07-27 PROCEDURE — 93798 PHYS/QHP OP CAR RHAB W/ECG: CPT

## 2018-07-30 ENCOUNTER — HOSPITAL ENCOUNTER (OUTPATIENT)
Dept: CARDIAC REHAB | Age: 63
Setting detail: THERAPIES SERIES
Discharge: HOME | End: 2018-07-30
Payer: MEDICARE

## 2018-08-01 ENCOUNTER — HOSPITAL ENCOUNTER (OUTPATIENT)
Dept: CARDIAC REHAB | Age: 63
Setting detail: THERAPIES SERIES
Discharge: HOME | End: 2018-08-01
Payer: MEDICARE

## 2018-08-01 ENCOUNTER — OFFICE VISIT (OUTPATIENT)
Dept: FAMILY MEDICINE CLINIC | Age: 63
End: 2018-08-01
Payer: MEDICARE

## 2018-08-01 VITALS
TEMPERATURE: 98.4 F | HEART RATE: 95 BPM | OXYGEN SATURATION: 95 % | HEIGHT: 65 IN | BODY MASS INDEX: 34.49 KG/M2 | DIASTOLIC BLOOD PRESSURE: 74 MMHG | RESPIRATION RATE: 16 BRPM | WEIGHT: 207 LBS | SYSTOLIC BLOOD PRESSURE: 137 MMHG

## 2018-08-01 VITALS — BODY MASS INDEX: 34.45 KG/M2 | WEIGHT: 207 LBS

## 2018-08-01 DIAGNOSIS — J06.9 UPPER RESPIRATORY TRACT INFECTION, UNSPECIFIED TYPE: ICD-10-CM

## 2018-08-01 DIAGNOSIS — J44.1 CHRONIC OBSTRUCTIVE PULMONARY DISEASE WITH ACUTE EXACERBATION (HCC): Primary | ICD-10-CM

## 2018-08-01 PROCEDURE — G8926 SPIRO NO PERF OR DOC: HCPCS | Performed by: FAMILY MEDICINE

## 2018-08-01 PROCEDURE — 3017F COLORECTAL CA SCREEN DOC REV: CPT | Performed by: FAMILY MEDICINE

## 2018-08-01 PROCEDURE — 93798 PHYS/QHP OP CAR RHAB W/ECG: CPT

## 2018-08-01 PROCEDURE — G8417 CALC BMI ABV UP PARAM F/U: HCPCS | Performed by: FAMILY MEDICINE

## 2018-08-01 PROCEDURE — 3023F SPIROM DOC REV: CPT | Performed by: FAMILY MEDICINE

## 2018-08-01 PROCEDURE — 1036F TOBACCO NON-USER: CPT | Performed by: FAMILY MEDICINE

## 2018-08-01 PROCEDURE — G8427 DOCREV CUR MEDS BY ELIG CLIN: HCPCS | Performed by: FAMILY MEDICINE

## 2018-08-01 PROCEDURE — 99214 OFFICE O/P EST MOD 30 MIN: CPT | Performed by: FAMILY MEDICINE

## 2018-08-01 RX ORDER — ASPIRIN 81 MG/1
81 TABLET, CHEWABLE ORAL
COMMUNITY

## 2018-08-01 RX ORDER — CHOLECALCIFEROL (VITAMIN D3) 125 MCG
CAPSULE ORAL
COMMUNITY
End: 2018-08-01 | Stop reason: SDUPTHER

## 2018-08-01 RX ORDER — BUDESONIDE AND FORMOTEROL FUMARATE DIHYDRATE 160; 4.5 UG/1; UG/1
AEROSOL RESPIRATORY (INHALATION)
COMMUNITY
Start: 2018-05-01

## 2018-08-01 RX ORDER — DOXYCYCLINE HYCLATE 100 MG/1
100 CAPSULE ORAL 2 TIMES DAILY
Qty: 20 CAPSULE | Refills: 0 | Status: SHIPPED | OUTPATIENT
Start: 2018-08-01 | End: 2018-08-11

## 2018-08-01 RX ORDER — GLIPIZIDE 10 MG/1
10 TABLET ORAL
COMMUNITY
Start: 2018-01-12 | End: 2018-08-01 | Stop reason: SDUPTHER

## 2018-08-01 RX ORDER — MONTELUKAST SODIUM 10 MG/1
10 TABLET ORAL
COMMUNITY
End: 2018-08-01 | Stop reason: SDUPTHER

## 2018-08-01 RX ORDER — METOPROLOL SUCCINATE 50 MG/1
50 TABLET, EXTENDED RELEASE ORAL DAILY
COMMUNITY
Start: 2018-01-12

## 2018-08-01 RX ORDER — METHYLPREDNISOLONE 4 MG/1
TABLET ORAL
Qty: 1 KIT | Refills: 0 | Status: SHIPPED | OUTPATIENT
Start: 2018-08-01 | End: 2018-11-14 | Stop reason: ALTCHOICE

## 2018-08-01 RX ORDER — BENZTROPINE MESYLATE 0.5 MG/1
TABLET ORAL
COMMUNITY
Start: 2018-01-04 | End: 2018-08-01 | Stop reason: SDUPTHER

## 2018-08-01 RX ORDER — ROSUVASTATIN CALCIUM 40 MG/1
40 TABLET, COATED ORAL
COMMUNITY
End: 2018-08-01 | Stop reason: SDUPTHER

## 2018-08-01 RX ORDER — BROMPHENIRAMINE MALEATE, PSEUDOEPHEDRINE HYDROCHLORIDE, AND DEXTROMETHORPHAN HYDROBROMIDE 2; 30; 10 MG/5ML; MG/5ML; MG/5ML
5 SYRUP ORAL 4 TIMES DAILY PRN
Qty: 180 ML | Refills: 0 | Status: SHIPPED | OUTPATIENT
Start: 2018-08-01 | End: 2019-03-20 | Stop reason: ALTCHOICE

## 2018-08-01 RX ORDER — FLUTICASONE FUROATE AND VILANTEROL 100; 25 UG/1; UG/1
POWDER RESPIRATORY (INHALATION)
COMMUNITY
End: 2018-11-14 | Stop reason: SDUPTHER

## 2018-08-01 RX ORDER — ALBUTEROL SULFATE 90 UG/1
AEROSOL, METERED RESPIRATORY (INHALATION)
COMMUNITY
End: 2018-08-01 | Stop reason: ALTCHOICE

## 2018-08-01 RX ORDER — LOSARTAN POTASSIUM 25 MG/1
TABLET ORAL
COMMUNITY
End: 2018-08-01 | Stop reason: SDUPTHER

## 2018-08-01 RX ORDER — SERTRALINE HYDROCHLORIDE 100 MG/1
100 TABLET, FILM COATED ORAL
COMMUNITY
Start: 2016-05-20 | End: 2018-08-01 | Stop reason: SDUPTHER

## 2018-08-01 RX ORDER — PANTOPRAZOLE SODIUM 40 MG/1
40 TABLET, DELAYED RELEASE ORAL
COMMUNITY
Start: 2016-07-20 | End: 2018-08-01 | Stop reason: SDUPTHER

## 2018-08-01 ASSESSMENT — ENCOUNTER SYMPTOMS
SINUS PAIN: 1
ALLERGIC/IMMUNOLOGIC NEGATIVE: 1
EYES NEGATIVE: 1
SINUS PRESSURE: 1
STRIDOR: 0

## 2018-08-01 NOTE — PROGRESS NOTES
[address] [facility] WALK-IN [facility]ILY [facility]IC[facility]   [address] [address]  Dept: [phone]  Dept Fax: [phone]    Haven Gooden is a 65 y.o. female who presents today for her medical conditions/complaints as noted below. Haven Gooden is c/o of   Chief Complaint   Patient presents with    Fever     x 1 day    Shortness of Breath    Diarrhea    Cough    Chest Congestion         HPI:     This patient is a 29-year-old white female the past medical history significant for pneumonia, emphysema, diabetes, hypertension, and anxiety. She also presents a day coming from the cardiac rehab clinic after stent placement and check? Patient states that the cardiac rehab clinic sent her here for a Z-Boone? The patient has had a one to 2 day history of cough, congestion, and production of sputum. She denies any chest pain. She complains of fever but none apparent today. She is alert and oriented. She states her sputum has a brown color to it. We will evaluate and treat. Hemoglobin A1C (%)   Date Value   01/27/2018 8.2 (H)   12/05/2017 8.0 (H)   05/05/2017 7.3 (H)             ( goal A1C is < 7)   Microalb/Crt. Ratio (mcg/mg creat)   Date Value   01/27/2018 39 (H)     LDL Cholesterol (mg/dL)   Date Value   01/27/2018 79   12/05/2017 123   05/05/2017 116       (goal LDL is <100)   AST (U/L)   Date Value   01/27/2018 118 (H)     ALT (U/L)   Date Value   01/27/2018 89 (H)     BUN (mg/dL)   Date Value   01/27/2018 21     BP Readings from Last 3 Encounters:   08/01/18 137/74   10/26/17 133/73   08/17/17 125/80          (goal 120/80)    Past Medical History:   Diagnosis Date    Anxiety     Asthma     Common cold     COPD (chronic obstructive pulmonary disease) ([address]     Depression     Diabetes mellitus (HCC)     Emphysema     Fatty liver     H/O: pneumonia FEB. 2014    Hiatal hernia     Hyperlipidemia     Hypertension     Mitral valve prolapse     Psychosis     SOB (shortness of breath) on exertion     Spinal headache     Stroke ([facility] [address] 2013    Mild Lt side    Upper respiratory symptom       Past Surgical History:   Procedure Laterality Date    CARPAL TUNNEL RELEASE Right     COLONOSCOPY  4-23-15    normal.    HYSTERECTOMY      Lt SO    SHOULDER ARTHROSCOPY Left 5 19 14    sad mini open rotator cuff       Family History   Problem Relation Age of Onset    Hypertension Mother     Heart Disease Mother         Valve problems & uncles   Linda Oris Cancer Mother         Breast    Heart Disease Father         stint placement    Hypertension Father         hx polio    Diabetes Other         uncle & aunt    Cancer Other         Aunts X 2, Stomack/Colon, Uncle    Alcohol Abuse Other         Aunt, uncle    Depression Son         Many       Social History   Substance Use Topics    Smoking status: Passive Smoke Exposure - Never Smoker     Types: Cigarettes     Last attempt to quit: 1/1/1990    Smokeless tobacco: Never Used      Comment: socially smoked when playing cards    Alcohol use No      Comment: socially      Current Outpatient Prescriptions   Medication Sig Dispense Refill    aspirin 81 MG chewable tablet Take 81 mg by mouth      fluticasone-vilanterol (BREO ELLIPTA) 100-25 MCG/INH AEPB inhaler Inhale into the lungs      dapagliflozin (FARXIGA) 10 MG tablet Take 10 mg by mouth      metoprolol succinate (TOPROL XL) 50 MG extended release tablet Take 50 mg by mouth      Dulaglutide (TRULICITY) 1.5 NG/2.7WM SOPN daily.  budesonide-formoterol (SYMBICORT) 160-4.5 MCG/ACT AERO as needed.  brompheniramine-pseudoephedrine-DM 30-2-10 MG/5ML syrup Take 5 mLs by mouth 4 times daily as needed for Cough 180 mL 0    doxycycline hyclate (VIBRAMYCIN) 100 MG capsule Take 1 capsule by mouth 2 times daily for 10 days 20 capsule 0    methylPREDNISolone (MEDROL, FLORES,) 4 MG tablet By mouth.  1 kit 0    Cholecalciferol (VITAMIN D3) 2000 units CAPS Take by mouth

## 2018-08-08 ENCOUNTER — HOSPITAL ENCOUNTER (OUTPATIENT)
Dept: CARDIAC REHAB | Age: 63
Setting detail: THERAPIES SERIES
Discharge: HOME | End: 2018-08-08
Payer: MEDICARE

## 2018-08-08 VITALS — BODY MASS INDEX: 33.51 KG/M2 | WEIGHT: 198.3 LBS

## 2018-08-08 PROCEDURE — 93798 PHYS/QHP OP CAR RHAB W/ECG: CPT

## 2018-08-10 ENCOUNTER — HOSPITAL ENCOUNTER (OUTPATIENT)
Dept: CARDIAC REHAB | Age: 63
Setting detail: THERAPIES SERIES
Discharge: HOME | End: 2018-08-10
Payer: MEDICARE

## 2018-08-10 VITALS — WEIGHT: 200.2 LBS | BODY MASS INDEX: 33.83 KG/M2

## 2018-08-10 PROCEDURE — 93798 PHYS/QHP OP CAR RHAB W/ECG: CPT

## 2018-08-13 ENCOUNTER — HOSPITAL ENCOUNTER (OUTPATIENT)
Dept: CARDIAC REHAB | Age: 63
Setting detail: THERAPIES SERIES
Discharge: HOME | End: 2018-08-13
Payer: MEDICARE

## 2018-08-13 VITALS — BODY MASS INDEX: 34.32 KG/M2 | WEIGHT: 203.1 LBS

## 2018-08-13 PROCEDURE — 93798 PHYS/QHP OP CAR RHAB W/ECG: CPT

## 2018-08-15 ENCOUNTER — HOSPITAL ENCOUNTER (OUTPATIENT)
Dept: CARDIAC REHAB | Age: 63
Setting detail: THERAPIES SERIES
Discharge: HOME | End: 2018-08-15
Payer: MEDICARE

## 2018-08-15 VITALS — BODY MASS INDEX: 34.31 KG/M2 | WEIGHT: 203 LBS

## 2018-08-15 PROCEDURE — 93798 PHYS/QHP OP CAR RHAB W/ECG: CPT

## 2018-08-17 ENCOUNTER — HOSPITAL ENCOUNTER (OUTPATIENT)
Dept: CARDIAC REHAB | Age: 63
Setting detail: THERAPIES SERIES
Discharge: HOME | End: 2018-08-17
Payer: MEDICARE

## 2018-08-17 VITALS — BODY MASS INDEX: 34.58 KG/M2 | WEIGHT: 204.6 LBS

## 2018-08-17 PROCEDURE — 93798 PHYS/QHP OP CAR RHAB W/ECG: CPT

## 2018-08-20 ENCOUNTER — HOSPITAL ENCOUNTER (OUTPATIENT)
Dept: CARDIAC REHAB | Age: 63
Setting detail: THERAPIES SERIES
Discharge: HOME | End: 2018-08-20
Payer: MEDICARE

## 2018-08-20 VITALS — BODY MASS INDEX: 34.24 KG/M2 | WEIGHT: 202.6 LBS

## 2018-08-20 PROCEDURE — 93798 PHYS/QHP OP CAR RHAB W/ECG: CPT

## 2018-08-22 ENCOUNTER — HOSPITAL ENCOUNTER (OUTPATIENT)
Dept: CARDIAC REHAB | Age: 63
Setting detail: THERAPIES SERIES
Discharge: HOME | End: 2018-08-22
Payer: MEDICARE

## 2018-08-22 VITALS — BODY MASS INDEX: 34.51 KG/M2 | WEIGHT: 204.2 LBS

## 2018-08-22 PROCEDURE — 93798 PHYS/QHP OP CAR RHAB W/ECG: CPT

## 2018-08-22 NOTE — PROGRESS NOTES
GOAL REVIEWED WITH PATIENT. SHE SAID SHE FEELS \"OK\". STATED SHE IS \"DOING MORE AROUND HER HOUSE, AND GOING UP AND DOWN HER BASEMENT STEPS\". TAKING ALL MEDICATIONS AS ORDERED.

## 2018-08-24 ENCOUNTER — HOSPITAL ENCOUNTER (OUTPATIENT)
Dept: CARDIAC REHAB | Age: 63
Setting detail: THERAPIES SERIES
Discharge: HOME | End: 2018-08-24
Payer: MEDICARE

## 2018-08-24 VITALS — WEIGHT: 205 LBS | BODY MASS INDEX: 34.64 KG/M2

## 2018-08-24 PROCEDURE — 93798 PHYS/QHP OP CAR RHAB W/ECG: CPT

## 2018-08-27 ENCOUNTER — HOSPITAL ENCOUNTER (OUTPATIENT)
Dept: CARDIAC REHAB | Age: 63
Setting detail: THERAPIES SERIES
Discharge: HOME | End: 2018-08-27
Payer: MEDICARE

## 2018-08-27 VITALS — WEIGHT: 206.9 LBS | HEIGHT: 65 IN | BODY MASS INDEX: 34.47 KG/M2

## 2018-08-27 PROCEDURE — 93798 PHYS/QHP OP CAR RHAB W/ECG: CPT

## 2018-08-29 ENCOUNTER — HOSPITAL ENCOUNTER (OUTPATIENT)
Dept: CARDIAC REHAB | Age: 63
Setting detail: THERAPIES SERIES
Discharge: HOME | End: 2018-08-29
Payer: MEDICARE

## 2018-08-29 VITALS — WEIGHT: 204.6 LBS | BODY MASS INDEX: 34.05 KG/M2

## 2018-08-29 PROCEDURE — 93798 PHYS/QHP OP CAR RHAB W/ECG: CPT

## 2018-08-31 ENCOUNTER — HOSPITAL ENCOUNTER (OUTPATIENT)
Dept: CARDIAC REHAB | Age: 63
Setting detail: THERAPIES SERIES
Discharge: HOME | End: 2018-08-31
Payer: MEDICARE

## 2018-08-31 VITALS — WEIGHT: 205.5 LBS | BODY MASS INDEX: 34.2 KG/M2

## 2018-08-31 PROCEDURE — 93798 PHYS/QHP OP CAR RHAB W/ECG: CPT

## 2018-09-03 ENCOUNTER — APPOINTMENT (OUTPATIENT)
Dept: CARDIAC REHAB | Age: 63
End: 2018-09-03
Payer: MEDICARE

## 2018-09-05 ENCOUNTER — HOSPITAL ENCOUNTER (OUTPATIENT)
Dept: CARDIAC REHAB | Age: 63
Setting detail: THERAPIES SERIES
Discharge: HOME | End: 2018-09-05
Payer: MEDICARE

## 2018-09-07 ENCOUNTER — APPOINTMENT (OUTPATIENT)
Dept: CARDIAC REHAB | Age: 63
End: 2018-09-07
Payer: MEDICARE

## 2018-09-10 ENCOUNTER — HOSPITAL ENCOUNTER (OUTPATIENT)
Dept: CARDIAC REHAB | Age: 63
Setting detail: THERAPIES SERIES
Discharge: HOME | End: 2018-09-10
Payer: MEDICARE

## 2018-09-12 ENCOUNTER — HOSPITAL ENCOUNTER (OUTPATIENT)
Dept: CARDIAC REHAB | Age: 63
Setting detail: THERAPIES SERIES
Discharge: HOME | End: 2018-09-12
Payer: MEDICARE

## 2018-09-12 VITALS — BODY MASS INDEX: 33.65 KG/M2 | WEIGHT: 202.2 LBS

## 2018-09-12 PROCEDURE — 93798 PHYS/QHP OP CAR RHAB W/ECG: CPT

## 2018-09-12 NOTE — PROGRESS NOTES
Goal reviewed-pt states she has been weak since she was ill, trying to do more yard work and [employer]" exercises.

## 2018-09-14 ENCOUNTER — HOSPITAL ENCOUNTER (OUTPATIENT)
Dept: CARDIAC REHAB | Age: 63
Setting detail: THERAPIES SERIES
Discharge: HOME | End: 2018-09-14
Payer: MEDICARE

## 2018-09-14 VITALS — WEIGHT: 203.4 LBS | BODY MASS INDEX: 33.85 KG/M2

## 2018-09-14 PROCEDURE — 93798 PHYS/QHP OP CAR RHAB W/ECG: CPT

## 2018-09-17 ENCOUNTER — HOSPITAL ENCOUNTER (OUTPATIENT)
Dept: CARDIAC REHAB | Age: 63
Setting detail: THERAPIES SERIES
Discharge: HOME | End: 2018-09-17
Payer: MEDICARE

## 2018-09-17 VITALS — WEIGHT: 203.5 LBS | BODY MASS INDEX: 33.86 KG/M2

## 2018-09-17 PROCEDURE — 93798 PHYS/QHP OP CAR RHAB W/ECG: CPT

## 2018-09-19 ENCOUNTER — HOSPITAL ENCOUNTER (OUTPATIENT)
Dept: CARDIAC REHAB | Age: 63
Setting detail: THERAPIES SERIES
Discharge: HOME | End: 2018-09-19
Payer: MEDICARE

## 2018-09-19 VITALS — WEIGHT: 203.5 LBS | BODY MASS INDEX: 33.86 KG/M2

## 2018-09-19 PROCEDURE — 93798 PHYS/QHP OP CAR RHAB W/ECG: CPT

## 2018-09-21 ENCOUNTER — HOSPITAL ENCOUNTER (OUTPATIENT)
Dept: CARDIAC REHAB | Age: 63
Setting detail: THERAPIES SERIES
Discharge: HOME | End: 2018-09-21
Payer: MEDICARE

## 2018-09-21 VITALS — WEIGHT: 203.8 LBS | BODY MASS INDEX: 33.91 KG/M2

## 2018-09-21 PROCEDURE — 93798 PHYS/QHP OP CAR RHAB W/ECG: CPT

## 2018-09-24 ENCOUNTER — HOSPITAL ENCOUNTER (OUTPATIENT)
Dept: CARDIAC REHAB | Age: 63
Setting detail: THERAPIES SERIES
Discharge: HOME | End: 2018-09-24
Payer: MEDICARE

## 2018-09-24 VITALS — WEIGHT: 203.5 LBS | BODY MASS INDEX: 33.86 KG/M2

## 2018-09-24 PROCEDURE — 93798 PHYS/QHP OP CAR RHAB W/ECG: CPT

## 2018-09-26 ENCOUNTER — HOSPITAL ENCOUNTER (OUTPATIENT)
Dept: WOMENS IMAGING | Age: 63
Discharge: HOME | End: 2018-09-28
Payer: MEDICARE

## 2018-09-26 ENCOUNTER — HOSPITAL ENCOUNTER (OUTPATIENT)
Dept: CARDIAC REHAB | Age: 63
Setting detail: THERAPIES SERIES
Discharge: HOME | End: 2018-09-26
Payer: MEDICARE

## 2018-09-26 VITALS — BODY MASS INDEX: 33.71 KG/M2 | WEIGHT: 202.6 LBS

## 2018-09-26 DIAGNOSIS — Z12.31 ENCOUNTER FOR SCREENING MAMMOGRAM FOR MALIGNANT NEOPLASM OF BREAST: ICD-10-CM

## 2018-09-26 PROCEDURE — 93798 PHYS/QHP OP CAR RHAB W/ECG: CPT

## 2018-09-26 PROCEDURE — 77063 BREAST TOMOSYNTHESIS BI: CPT

## 2018-09-26 ASSESSMENT — PATIENT HEALTH QUESTIONNAIRE - PHQ9
SUM OF ALL RESPONSES TO PHQ QUESTIONS 1-9: 1
SUM OF ALL RESPONSES TO PHQ QUESTIONS 1-9: 1

## 2018-09-28 ENCOUNTER — HOSPITAL ENCOUNTER (OUTPATIENT)
Dept: CARDIAC REHAB | Age: 63
Setting detail: THERAPIES SERIES
Discharge: HOME | End: 2018-09-28
Payer: MEDICARE

## 2018-09-28 VITALS — WEIGHT: 201.6 LBS | BODY MASS INDEX: 33.59 KG/M2 | HEIGHT: 65 IN

## 2018-09-28 PROCEDURE — 93798 PHYS/QHP OP CAR RHAB W/ECG: CPT

## 2018-09-28 NOTE — PROGRESS NOTES
GOAL AND ITP REVIEWED AND UPDATED. PT STATES SHE FEELS STRONGER AND ABLE TO DO MORE AND CAN TELL HER ENDURANCE HAS IMPROVED. STATES HER OVERALL WELL BEING HAS IMPROVED MENTALLY FROM EXERCISE. TAKING ALL MEDICATIONS AS ORDERED AND HAS NO QUESTIONS. TAKES SHORT WALKS IN HER HOME AND TO THE STORE FOR EXERCISE A FEW TIMES A WEEK AND GOES UP AND DOWN HER BASEMENT STEPS, AND HAS NOT HAD ANY FALLS RECENTLY.

## 2018-10-01 ENCOUNTER — HOSPITAL ENCOUNTER (OUTPATIENT)
Dept: CARDIAC REHAB | Age: 63
Setting detail: THERAPIES SERIES
Discharge: HOME | End: 2018-10-01
Payer: MEDICARE

## 2018-10-01 VITALS — WEIGHT: 203 LBS | BODY MASS INDEX: 33.78 KG/M2

## 2018-10-01 PROCEDURE — 93798 PHYS/QHP OP CAR RHAB W/ECG: CPT

## 2018-10-01 RX ORDER — ESCITALOPRAM OXALATE 20 MG/1
20 TABLET ORAL NIGHTLY
COMMUNITY

## 2018-10-03 ENCOUNTER — HOSPITAL ENCOUNTER (OUTPATIENT)
Dept: CARDIAC REHAB | Age: 63
Setting detail: THERAPIES SERIES
Discharge: HOME | End: 2018-10-03
Payer: MEDICARE

## 2018-10-03 VITALS — BODY MASS INDEX: 33.68 KG/M2 | WEIGHT: 202.4 LBS

## 2018-10-03 PROCEDURE — 93798 PHYS/QHP OP CAR RHAB W/ECG: CPT

## 2018-10-05 ENCOUNTER — HOSPITAL ENCOUNTER (OUTPATIENT)
Dept: CARDIAC REHAB | Age: 63
Setting detail: THERAPIES SERIES
Discharge: HOME | End: 2018-10-05
Payer: MEDICARE

## 2018-10-05 ENCOUNTER — HOSPITAL ENCOUNTER (OUTPATIENT)
Age: 63
Discharge: HOME | End: 2018-10-05
Payer: MEDICARE

## 2018-10-05 VITALS — BODY MASS INDEX: 33.72 KG/M2 | HEIGHT: 65 IN | WEIGHT: 202.4 LBS

## 2018-10-05 LAB
-: ABNORMAL
ABSOLUTE EOS #: 0.2 K/UL (ref 0–0.4)
ABSOLUTE IMMATURE GRANULOCYTE: ABNORMAL K/UL (ref 0–0.3)
ABSOLUTE LYMPH #: 1.9 K/UL (ref 1–4.8)
ABSOLUTE MONO #: 0.6 K/UL (ref 0.1–1.3)
ALBUMIN SERPL-MCNC: 4.3 G/DL (ref 3.5–5.2)
ALBUMIN/GLOBULIN RATIO: ABNORMAL (ref 1–2.5)
ALP BLD-CCNC: 73 U/L (ref 35–104)
ALT SERPL-CCNC: 40 U/L (ref 5–33)
AMORPHOUS: ABNORMAL
ANION GAP SERPL CALCULATED.3IONS-SCNC: 14 MMOL/L (ref 9–17)
AST SERPL-CCNC: 39 U/L
BACTERIA: ABNORMAL
BASOPHILS # BLD: 1 % (ref 0–2)
BASOPHILS ABSOLUTE: 0.1 K/UL (ref 0–0.2)
BILIRUB SERPL-MCNC: 0.49 MG/DL (ref 0.3–1.2)
BILIRUBIN URINE: NEGATIVE
BUN BLDV-MCNC: 19 MG/DL (ref 8–23)
BUN/CREAT BLD: ABNORMAL (ref 9–20)
CALCIUM SERPL-MCNC: 9.8 MG/DL (ref 8.6–10.4)
CASTS UA: ABNORMAL /LPF
CHLORIDE BLD-SCNC: 104 MMOL/L (ref 98–107)
CHOLESTEROL/HDL RATIO: 2.7
CHOLESTEROL: 169 MG/DL
CO2: 22 MMOL/L (ref 20–31)
COLOR: YELLOW
COMMENT UA: ABNORMAL
CREAT SERPL-MCNC: 0.93 MG/DL (ref 0.5–0.9)
CREATININE URINE: 66.8 MG/DL (ref 28–217)
CRYSTALS, UA: ABNORMAL /HPF
DIFFERENTIAL TYPE: ABNORMAL
EOSINOPHILS RELATIVE PERCENT: 3 % (ref 0–4)
EPITHELIAL CELLS UA: ABNORMAL /HPF
ESTIMATED AVERAGE GLUCOSE: 255 MG/DL
FERRITIN: 47 UG/L (ref 13–150)
FOLATE: 17.3 NG/ML
GFR AFRICAN AMERICAN: >60 ML/MIN
GFR NON-AFRICAN AMERICAN: >60 ML/MIN
GFR SERPL CREATININE-BSD FRML MDRD: ABNORMAL ML/MIN/{1.73_M2}
GFR SERPL CREATININE-BSD FRML MDRD: ABNORMAL ML/MIN/{1.73_M2}
GLUCOSE BLD-MCNC: 214 MG/DL (ref 70–99)
GLUCOSE URINE: ABNORMAL
HBA1C MFR BLD: 10.5 % (ref 4–6)
HCT VFR BLD CALC: 38.3 % (ref 36–46)
HDLC SERPL-MCNC: 63 MG/DL
HEMOGLOBIN: 12.3 G/DL (ref 12–16)
IMMATURE GRANULOCYTES: ABNORMAL %
IRON SATURATION: 15 % (ref 20–55)
IRON: 58 UG/DL (ref 37–145)
KETONES, URINE: NEGATIVE
LDL CHOLESTEROL: 82 MG/DL (ref 0–130)
LEUKOCYTE ESTERASE, URINE: NEGATIVE
LYMPHOCYTES # BLD: 24 % (ref 24–44)
MCH RBC QN AUTO: 26.4 PG (ref 26–34)
MCHC RBC AUTO-ENTMCNC: 32 G/DL (ref 31–37)
MCV RBC AUTO: 82.4 FL (ref 80–100)
MICROALBUMIN/CREAT 24H UR: 83 MG/L
MICROALBUMIN/CREAT UR-RTO: 124 MCG/MG CREAT
MONOCYTES # BLD: 8 % (ref 1–7)
MUCUS: ABNORMAL
NITRITE, URINE: NEGATIVE
NRBC AUTOMATED: ABNORMAL PER 100 WBC
OTHER OBSERVATIONS UA: ABNORMAL
PDW BLD-RTO: 16.1 % (ref 11.5–14.9)
PH UA: 5 (ref 5–8)
PLATELET # BLD: 251 K/UL (ref 150–450)
PLATELET ESTIMATE: ABNORMAL
PMV BLD AUTO: 8.5 FL (ref 6–12)
POTASSIUM SERPL-SCNC: 4.4 MMOL/L (ref 3.7–5.3)
PROTEIN UA: ABNORMAL
RBC # BLD: 4.66 M/UL (ref 4–5.2)
RBC # BLD: ABNORMAL 10*6/UL
RBC UA: ABNORMAL /HPF
RENAL EPITHELIAL, UA: ABNORMAL /HPF
SEG NEUTROPHILS: 64 % (ref 36–66)
SEGMENTED NEUTROPHILS ABSOLUTE COUNT: 5.3 K/UL (ref 1.3–9.1)
SODIUM BLD-SCNC: 140 MMOL/L (ref 135–144)
SPECIFIC GRAVITY UA: 1.03 (ref 1–1.03)
THYROXINE, FREE: 1.15 NG/DL (ref 0.93–1.7)
TOTAL IRON BINDING CAPACITY: 381 UG/DL (ref 250–450)
TOTAL PROTEIN: 7.8 G/DL (ref 6.4–8.3)
TRICHOMONAS: ABNORMAL
TRIGL SERPL-MCNC: 118 MG/DL
TSH SERPL DL<=0.05 MIU/L-ACNC: 0.83 MIU/L (ref 0.3–5)
TURBIDITY: CLEAR
UNSATURATED IRON BINDING CAPACITY: 323 UG/DL (ref 112–347)
URINE HGB: ABNORMAL
UROBILINOGEN, URINE: NORMAL
VITAMIN B-12: 647 PG/ML (ref 232–1245)
VITAMIN D 25-HYDROXY: 51.6 NG/ML (ref 30–100)
VLDLC SERPL CALC-MCNC: NORMAL MG/DL (ref 1–30)
WBC # BLD: 8.2 K/UL (ref 3.5–11)
WBC # BLD: ABNORMAL 10*3/UL
WBC UA: ABNORMAL /HPF
YEAST: ABNORMAL

## 2018-10-05 PROCEDURE — 82043 UR ALBUMIN QUANTITATIVE: CPT

## 2018-10-05 PROCEDURE — 84443 ASSAY THYROID STIM HORMONE: CPT

## 2018-10-05 PROCEDURE — 82570 ASSAY OF URINE CREATININE: CPT

## 2018-10-05 PROCEDURE — 82607 VITAMIN B-12: CPT

## 2018-10-05 PROCEDURE — 82306 VITAMIN D 25 HYDROXY: CPT

## 2018-10-05 PROCEDURE — 84439 ASSAY OF FREE THYROXINE: CPT

## 2018-10-05 PROCEDURE — 80053 COMPREHEN METABOLIC PANEL: CPT

## 2018-10-05 PROCEDURE — 82746 ASSAY OF FOLIC ACID SERUM: CPT

## 2018-10-05 PROCEDURE — 82728 ASSAY OF FERRITIN: CPT

## 2018-10-05 PROCEDURE — 80061 LIPID PANEL: CPT

## 2018-10-05 PROCEDURE — 83540 ASSAY OF IRON: CPT

## 2018-10-05 PROCEDURE — 83550 IRON BINDING TEST: CPT

## 2018-10-05 PROCEDURE — 85025 COMPLETE CBC W/AUTO DIFF WBC: CPT

## 2018-10-05 PROCEDURE — 81001 URINALYSIS AUTO W/SCOPE: CPT

## 2018-10-05 PROCEDURE — 36415 COLL VENOUS BLD VENIPUNCTURE: CPT

## 2018-10-05 PROCEDURE — 93798 PHYS/QHP OP CAR RHAB W/ECG: CPT

## 2018-10-05 PROCEDURE — 83036 HEMOGLOBIN GLYCOSYLATED A1C: CPT

## 2018-11-14 ENCOUNTER — HOSPITAL ENCOUNTER (OUTPATIENT)
Dept: PHARMACY | Age: 63
Setting detail: THERAPIES SERIES
Discharge: HOME | End: 2018-11-14
Payer: MEDICARE

## 2018-11-14 ENCOUNTER — TELEPHONE (OUTPATIENT)
Dept: PHARMACY | Age: 63
End: 2018-11-14

## 2018-11-14 VITALS — WEIGHT: 205.1 LBS | BODY MASS INDEX: 34.13 KG/M2

## 2018-11-14 PROCEDURE — 99212 OFFICE O/P EST SF 10 MIN: CPT

## 2018-11-14 RX ORDER — METFORMIN HYDROCHLORIDE 500 MG/1
500 TABLET, EXTENDED RELEASE ORAL
Qty: 30 TABLET | Refills: 1 | Status: SHIPPED | OUTPATIENT
Start: 2018-11-14 | End: 2018-11-28 | Stop reason: DRUGHIGH

## 2018-11-14 NOTE — PROGRESS NOTES
(allergies)    Yes Historical Provider, MD   glipiZIDE (GLUCOTROL) 10 MG tablet Take 1 tablet by mouth 2 times daily 5/12/16  Yes Historical Provider, MD   naproxen (NAPROSYN) 500 MG tablet Take 1 tablet by mouth 2 times daily (with meals)  Patient taking differently: Take 500 mg by mouth 2 times daily (with meals) Only takes for extreme pain 5/16/16  Yes Evy Cornejo PA-C   albuterol (PROVENTIL) (2.5 MG/3ML) 0.083% nebulizer solution Take 2.5 mg by nebulization every 6 hours as needed for Wheezing. Yes Historical Provider, MD   albuterol (VENTOLIN HFA) 108 (90 BASE) MCG/ACT inhaler Inhale 2 puffs into the lungs every 6 hours as needed for Wheezing. Yes Historical Provider, MD   brompheniramine-pseudoephedrine-DM 30-2-10 MG/5ML syrup Take 5 mLs by mouth 4 times daily as needed for Cough 8/1/18   Marisa Mayberry MD   fluticasone-vilanterol (BREO ELLIPTA) 100-25 MCG/INH AEPB inhaler Inhale into the lungs  11/14/18  Historical Provider, MD   methylPREDNISolone (MEDROL, FLORES,) 4 MG tablet By mouth. 8/1/18 11/14/18  Marisa Mayberry MD   benztropine (COGENTIN) 0.5 MG tablet TAKE 1 TABLET BY MOUTH AT BEDTIME FOR 1 WEEK THEN 1 TABLET EVERY 12 HOURS THERE AFTER 9/11/17   Historical Provider, MD   Diphenhydramine-APAP, sleep, (TYLENOL PM EXTRA STRENGTH PO) Take 500 mg by mouth nightly as needed  11/14/18  Historical Provider, MD   [address] TEST strip  9/23/15   Historical Provider, MD       Immunizations:   Most Recent Immunizations   Administered Date(s) Administered    Influenza Virus Vaccine 11/20/2015    Pneumococcal 13-valent Conjugate (Yudy Gu[place]) 11/20/2015       Home Blood Glucose Results:     Blood glucose trends noted: Since starting increased dose of Lantus 30 units once a day fasting blood sugar trending 181-238. Before lunch 117-205 and before dinner 161-168.       Assessment     A1c at goal: No: 10.5 on 10/5/18  Blood Pressure at goal: Yes  Weight at goal: No  Physical activity: 30 minutes five times per week  Physical activity at goal: Yes  Smoking Cessation: Yes  Cholesterol at target: No: LDL 82  Annual eye exam completed: Yes  Comprehensive Foot Exam Completed: Yes  Annual urine albumin and serum creatinine: Yes    Statin: Yes    Appropriate?: Yes  Changes made:     ACE/ARB: Yes  Appropriate?: Yes  Changes made:     Aspirin: Yes  Appropriate?: Yes  Changes made:     Eating patterns:    []  My plate    []  Mediterranean diet   []  Low sodium   []  DASH diet   []  Portion control   []  Reduced calorie    []  Fast food / Restaurant  []  High glycemic index foods   []  Sugary beverages   [x]  Other     Comment: Reviewed patient logging of her diet and noted patient is often skipping meals and only having coffee and diet soda for breakfast.  Dinners are often high in carbohydrates. Discussed having more balanced meals and having three meals a day rather than going so long without eating. Also discussed different options for snacks/meals that may be better for her diabetes. Current Medications Affecting Diabetes:  Dapaglifozin 10mg once a day  Dulaglutide 1.5mg subQ once weekly  Insulin glargine 30 units subQ each evening  Glipizide 10mg twice a day. Compliant: yes  Barriers to medication therapy: no    Medications attempted in the past:  Metformin about 3 years ago with some diarrhea. Unsure if regular or extended release. Recent exacerbations / new problems:  A1C has been increasing. Patient did pull at a hang nail and pulled nail down too far. Has nail bandaged but patient did take bandage off for me to see. No redness or sign of infection. Encouraged patient to go to podiatrist for nail/foot care. She is putting neosporin on the wound and plans to call physician if any redness or discharge. Last office dictation reviewed: yes        type 2 DM under worsening control as evidenced by A1C increasing.     Plan     Initial diabetic education materials given to patient including the

## 2018-11-28 ENCOUNTER — TELEPHONE (OUTPATIENT)
Dept: PHARMACY | Age: 63
End: 2018-11-28

## 2018-11-28 ENCOUNTER — HOSPITAL ENCOUNTER (OUTPATIENT)
Dept: PHARMACY | Age: 63
Setting detail: THERAPIES SERIES
Discharge: HOME | End: 2018-11-28
Payer: MEDICARE

## 2018-11-28 VITALS — BODY MASS INDEX: 34.08 KG/M2 | WEIGHT: 204.8 LBS

## 2018-11-28 PROCEDURE — 99213 OFFICE O/P EST LOW 20 MIN: CPT

## 2018-11-28 RX ORDER — METFORMIN HYDROCHLORIDE 500 MG/1
500 TABLET, EXTENDED RELEASE ORAL 2 TIMES DAILY WITH MEALS
Qty: 60 TABLET | Refills: 3 | OUTPATIENT
Start: 2018-11-28 | End: 2018-12-19 | Stop reason: DRUGHIGH

## 2018-12-19 ENCOUNTER — TELEPHONE (OUTPATIENT)
Dept: PHARMACY | Age: 63
End: 2018-12-19

## 2018-12-19 ENCOUNTER — HOSPITAL ENCOUNTER (OUTPATIENT)
Dept: PHARMACY | Age: 63
Setting detail: THERAPIES SERIES
Discharge: HOME | End: 2018-12-19
Payer: MEDICARE

## 2018-12-19 VITALS — BODY MASS INDEX: 33.85 KG/M2 | WEIGHT: 203.4 LBS

## 2018-12-19 PROCEDURE — 99212 OFFICE O/P EST SF 10 MIN: CPT

## 2018-12-19 RX ORDER — METFORMIN HYDROCHLORIDE 500 MG/1
TABLET, EXTENDED RELEASE ORAL
Qty: 90 TABLET | Refills: 3 | OUTPATIENT
Start: 2018-12-19 | End: 2019-06-12 | Stop reason: DRUGHIGH

## 2018-12-19 NOTE — PROGRESS NOTES
hypoglycemia   [x]  None    []  Shakiness    []  Lightheadedness or dizziness   []  Confusion      []  Sweating   []  Other       Symptoms of hyperglycemia   []  None   [x]  Frequent urination    [x]  Increased thirst   []  Other    Medication adverse reactions   [x]  None   []  Diarrhea / Nausea / Vomiting / Constipation / flatulence   []  Hypertension   []  Peripheral edema     []  Signs of infection   []  Headache   []  Vision changes   []  Increased cholesterol    []  Weight gain   []  Change in renal function   []  Increased potassium  []  Other      Comments:  Started metformin XR 500mg once a day with no side effect. If recent hospital admission / ED visit, was this related to Diabetes No: cardiac cath / stent placement . Discharge date May 2018    Objective     PMHx:    Past Medical History:   Diagnosis Date    Anxiety     Asthma     Common cold     [facility] [address]     Depression     Diabetes mellitus (HCC)     Emphysema     Fatty liver     H/O: pneumonia FEB. 2014    Hiatal hernia     Hyperlipidemia     Hypertension     Mitral valve prolapse     Psychosis     SOB (shortness of breath) on exertion     Spinal headache     Stroke ([facility] [address] 2013    Mild Lt side    Upper respiratory symptom        Social History:    Social History   Substance Use Topics    Smoking status: Passive Smoke Exposure - Never Smoker     Types: Cigarettes     Last attempt to quit: 1/1/1990    Smokeless tobacco: Never Used      Comment: socially smoked when playing cards    Alcohol use No      Comment: socially       Pertinent Labs:    Lab Results   Component Value Date    LABA1C 10.5 (H) 10/05/2018    LABA1C 8.2 (H) 01/27/2018    LABA1C 8.0 (H) 12/05/2017     Lab Results   Component Value Date    CHOL 169 10/05/2018    TRIG 118 10/05/2018    HDL 63 10/05/2018     Lab Results   Component Value Date    CREATININE 0.93 (H) 10/05/2018    BUN 19 10/05/2018     10/05/2018    K

## 2018-12-26 LAB — HBA1C MFR BLD: 9.2 %

## 2019-01-09 ENCOUNTER — HOSPITAL ENCOUNTER (OUTPATIENT)
Dept: PHARMACY | Age: 64
Setting detail: THERAPIES SERIES
End: 2019-01-09
Payer: MEDICARE

## 2019-01-23 ENCOUNTER — TELEPHONE (OUTPATIENT)
Dept: PHARMACY | Age: 64
End: 2019-01-23

## 2019-01-23 ENCOUNTER — HOSPITAL ENCOUNTER (OUTPATIENT)
Dept: PHARMACY | Age: 64
Setting detail: THERAPIES SERIES
Discharge: HOME | End: 2019-01-23
Payer: MEDICARE

## 2019-01-23 PROCEDURE — 99213 OFFICE O/P EST LOW 20 MIN: CPT

## 2019-02-20 ENCOUNTER — HOSPITAL ENCOUNTER (OUTPATIENT)
Dept: PHARMACY | Age: 64
Setting detail: THERAPIES SERIES
Discharge: HOME | End: 2019-02-20
Payer: MEDICARE

## 2019-02-20 VITALS — BODY MASS INDEX: 35.26 KG/M2 | WEIGHT: 211.9 LBS

## 2019-02-20 PROCEDURE — 99211 OFF/OP EST MAY X REQ PHY/QHP: CPT

## 2019-03-18 LAB — HBA1C MFR BLD: 6.8 %

## 2019-03-20 ENCOUNTER — HOSPITAL ENCOUNTER (OUTPATIENT)
Dept: PHARMACY | Age: 64
Setting detail: THERAPIES SERIES
Discharge: HOME | End: 2019-03-20
Payer: COMMERCIAL

## 2019-03-20 VITALS — BODY MASS INDEX: 34.63 KG/M2 | WEIGHT: 208.09 LBS

## 2019-03-20 PROCEDURE — 99211 OFF/OP EST MAY X REQ PHY/QHP: CPT

## 2019-05-01 ENCOUNTER — HOSPITAL ENCOUNTER (OUTPATIENT)
Dept: PHARMACY | Age: 64
Setting detail: THERAPIES SERIES
Discharge: HOME | End: 2019-05-01
Payer: COMMERCIAL

## 2019-05-01 VITALS — WEIGHT: 211.7 LBS | BODY MASS INDEX: 35.23 KG/M2

## 2019-05-01 PROCEDURE — 99211 OFF/OP EST MAY X REQ PHY/QHP: CPT

## 2019-05-01 NOTE — PROGRESS NOTES
patient but if hungry will drink a glass of milk or eat a small snack that comes with mobile meals. If wants to cheat a bit will have a min twix candy bar. Only one episode of hypoglycemia of 63 about two weeks ago. Ate and resolved per patient. Patient has some vaginal discharge and concerned she has yeast infection due to being on [address]. Patient states does not have any discomfort/itching etc.  Very small amount and only noticed some times of the day. Patient does not have a normal GYN physician but will inquire with PCP. Suggested trying Monistat 7 but also encouraged her to discuss with PCP. []  Missed doses   []  Emergency Room Visit    []  Medication changes  []  Hospitalization   []  Diet changes   []  Acute illness   []  Activity changes      Symptoms of hypoglycemia    [x]  None- other than one episode that patient states she did not feel too bad.   []  Shakiness    []  Lightheadedness or dizziness   []  Confusion      []  Sweating   []  Other       Symptoms of hyperglycemia   [x]  None   []  Frequent urination    []  Increased thirst   []  Other    Medication adverse reactions   [x]  None   []  Diarrhea / Nausea / Vomiting / Constipation / flatulence   []  Hypertension   []  Peripheral edema     []  Signs of infection   []  Headache   []  Vision changes   []  Increased cholesterol    []  Weight gain   []  Change in renal function   []  Increased potassium  []  Other      Comments:      If recent hospital admission / ED visit, was this related to Diabetes No: cardiac cath / stent placement . Discharge date May 2018    Objective     PMHx:    Past Medical History:   Diagnosis Date    Anxiety     Asthma     Common cold     [facility] [address]     Depression     Diabetes mellitus (HCC)     Emphysema     Fatty liver     H/O: pneumonia FEB. 2014    Hiatal hernia     Hyperlipidemia     Hypertension     Mitral valve prolapse     Psychosis     SOB (shortness of breath) on exertion     Spinal headache     [facility]roke [facility]) 2013    Mild Lt side    Upper respiratory symptom        Social History:    Social History     Tobacco Use    Smoking status: Passive Smoke Exposure - Never Smoker    Smokeless tobacco: Never Used    Tobacco comment: socially smoked when playing cards   Substance Use Topics    Alcohol use: No     Alcohol/week: 0.0 oz     Comment: socially       Pertinent Labs:    Lab Results   Component Value Date    LABA1C 6.8 03/18/2019    LABA1C 9.2 12/26/2018    LABA1C 10.5 (H) 10/05/2018     Lab Results   Component Value Date    CHOL 169 10/05/2018    TRIG 118 10/05/2018    HDL 63 10/05/2018     Lab Results   Component Value Date    CREATININE 0.93 (H) 10/05/2018    BUN 19 10/05/2018     10/05/2018    K 4.4 10/05/2018     10/05/2018    CO2 22 10/05/2018     Lab Results   Component Value Date    ALT 40 10/05/2018       Weight:  Wt Readings from Last 3 Encounters:   05/01/19 211 lb 11.2 oz (96 kg)   03/20/19 208 lb 1.5 oz (94.4 kg)   02/20/19 211 lb 14.4 oz (96.1 kg)       Current medications:  Prior to Admission medications    Medication Sig Start Date End Date Taking? Authorizing Provider   tiotropium (SPIRIVA RESPIMAT) 2.5 MCG/ACT AERS inhaler Inhale 2 puffs into the lungs daily   Yes Historical Provider, MD   metFORMIN (GLUCOPHAGE XR) 500 MG extended release tablet Take one tablet (500mg) each morning and two tablets (1000mg) each evening.  12/19/18  Yes Radha Santana   escitalopram (LEXAPRO) 10 MG tablet Take 20 mg by mouth daily    Yes Historical Provider, MD   aspirin 81 MG chewable tablet Take 81 mg by mouth   Yes Historical Provider, MD   dapagliflozin (FARXIGA) 10 MG tablet Take 10 mg by mouth 1/12/18  Yes Historical Provider, MD   metoprolol succinate (TOPROL XL) 50 MG extended release tablet Take 50 mg by mouth daily  1/12/18  Yes Historical Provider, MD   Dulaglutide (TRULICITY) 1.5 KF/4.2AG SOPN weekly 1/12/18  Yes Historical Provider, MD   budesonide-formoterol (SYMBICORT) 160-4.5 MCG/ACT AERO 2 puffs twice a day 5/1/18  Yes Historical Provider, MD   Cholecalciferol (VITAMIN D3) 2000 units CAPS Take by mouth daily   Yes Historical Provider, MD   insulin glargine (LANTUS) 100 UNIT/ML injection vial Inject 30 Units into the skin nightly    Yes Historical Provider, MD   ticagrelor (BRILINTA) 90 MG TABS tablet Take 90 mg by mouth 2 times daily   Yes Historical Provider, MD   benztropine (COGENTIN) 0.5 MG tablet TAKE 1 TABLET BY MOUTH AT BEDTIME FOR 1 WEEK THEN 1 TABLET EVERY 12 HOURS THERE AFTER 9/11/17  Yes Historical Provider, MD   losartan (COZAAR) 50 MG tablet Take 50 mg by mouth daily  10/25/17  Yes Historical Provider, MD   brexpiprazole (REXULTI) 1 MG TABS tablet Take 2 mg by mouth daily    Yes Historical Provider, MD   rosuvastatin (CRESTOR) 40 MG tablet Take 40 mg by mouth every evening   Yes Historical Provider, MD   montelukast (SINGULAIR) 10 MG tablet Take 10 mg by mouth nightly   Yes Historical Provider, MD   pantoprazole (PROTONIX) 40 MG tablet Take 1 tablet by mouth daily 7/20/16  Yes Alfredo Simpson PA-C   loratadine (CLARITIN) 10 MG tablet Take 10 mg by mouth daily as needed (allergies)    Yes Historical Provider, MD   glipiZIDE (GLUCOTROL) 10 MG tablet Take 1 tablet by mouth 2 times daily 5/12/16  Yes Historical Provider, MD   naproxen (NAPROSYN) 500 MG tablet Take 1 tablet by mouth 2 times daily (with meals)  Patient taking differently: Take 500 mg by mouth 2 times daily (with meals) Only takes for extreme pain 5/16/16  Yes Alfredo Simpson PA-C   ONE TOUCH ULTRA TEST strip  9/23/15  Yes Historical Provider, MD   albuterol (PROVENTIL) (2.5 MG/3ML) 0.083% nebulizer solution Take 2.5 mg by nebulization every 6 hours as needed for Wheezing. Yes Historical Provider, MD   albuterol (VENTOLIN HFA) 108 (90 BASE) MCG/ACT inhaler Inhale 2 puffs into the lungs every 6 hours as needed for Wheezing.    Yes Historical

## 2019-06-12 ENCOUNTER — HOSPITAL ENCOUNTER (OUTPATIENT)
Dept: PHARMACY | Age: 64
Setting detail: THERAPIES SERIES
Discharge: HOME | End: 2019-06-12
Payer: COMMERCIAL

## 2019-06-12 VITALS — WEIGHT: 216.9 LBS | BODY MASS INDEX: 36.09 KG/M2

## 2019-06-12 PROCEDURE — 99213 OFFICE O/P EST LOW 20 MIN: CPT

## 2019-06-12 RX ORDER — METFORMIN HYDROCHLORIDE 500 MG/1
1000 TABLET, EXTENDED RELEASE ORAL 2 TIMES DAILY
Qty: 30 TABLET | Refills: 3 | OUTPATIENT
Start: 2019-06-12 | End: 2019-09-04

## 2019-06-12 NOTE — PROGRESS NOTES
Diabetic Medication Management   \[facility]\"" [address] [place], [address] [facility]  Phone: [phone]  Fax: [phone]    NAME: [address] [facility] RECORD NUMBER:  [id number]  AGE: 59 y.o. GENDER: female  : 1955  EPISODE DATE:  2019       Ms. Larry Curran was referred to [facility] Medication Management Services by Dr. Sajan Patrick, Special instructions include: follow up every 3 months with physician in office     Goals per referral:   Fasting blood glucose:   Peak postprandial glucose: < 180  A1C: < 7%    Other goals:  Blood pressure goal: 130/80    Weight loss goal (~10%): Target weight  200 to be reached by date: 2019 (3-6 months)  Physical Activity goal:    30 minutes 5 times per week to be reached by date: May 2019 (3-6 months)  Smoking Cessation   Quit Date: never smoked  Cholesterol at target:Yes    Date: 2018  Annual eye exam:    Date: 2019  Comprehensive annual foot exam:   Date: 2018    Annual urine Albumin and serum creatinine:   Date: 2018    Patient Specific Goals:  1. Lower blood sugars  2. Lower A1C    Subjective   Ms. Larry Curran is a 59 y.o. female here for the Diabetes Service for self-management education, medication review including over the counter medications and herbal products, overall wellbeing assessment, transition of care and any needed adjustments with updates and recommendations communicated to the referring physician. Patient's name and  verified. Patient Findings: Patient continues on Lantus 30 units a day and metformin  mg  In the morning and 1000 mg  XR in the evening and tolerating well. Patient reports being very stress with interactions with sister. Sister has been saying bad things about her and telling her to stop her medications. Patient states she has not considered taking medications and understands importance of taking her medications.   Supported this thought and encouraged continued compliance. Talking to her therapist about issues with sister. Patient continues to eat more protein. Has been eating more turkey than chicken or fish. Still gets mobile meals. Patient has been skipping meals more since last appt as has gotten busier with the lawn. Encouraged her to not skip meals. Has skipped dinner when blood sugar is high. Discussed not skipping meals but to eat something with protein. Continues to not snack much at all unless he has fruit. No candy lately and no other sweets around. Only one episode of hypoglycemia of 58 last week. Patient ate some protein. Educated patient to eat/drink fast sugar like juice or pop (regular) when blood sugar low. Patient continues to have vaginal discharge and concerned she has yeast infection due to being on [address]. Noted this on last visit but states she did not have the $$ to pay for monistat. Called PCP to inform them of patient reports/symptoms. Unable to speak to staff so left a message explaining patient's inability to pay for monistat and inquiring if they would like to have patient in for eval ur call in RX for fluconazole.     []  Missed doses   []  Emergency Room Visit    []  Medication changes  []  Hospitalization   []  Diet changes   []  Acute illness   []  Activity changes      Symptoms of hypoglycemia    [x]  None- other than one episode of 58 last week when felt shaky   []  Shakiness    []  Lightheadedness or dizziness   []  Confusion      []  Sweating   []  Other       Symptoms of hyperglycemia   [x]  None   []  Frequent urination    []  Increased thirst   []  Other    Medication adverse reactions   [x]  None   []  Diarrhea / Nausea / Vomiting / Constipation / flatulence   []  Hypertension   []  Peripheral edema     []  Signs of infection   []  Headache   []  Vision changes   []  Increased cholesterol    []  Weight gain   []  Change in renal function   []  Increased potassium  []  Other Comments:      If recent hospital admission / ED visit, was this related to Diabetes No: cardiac cath / stent placement . Discharge date May 2018    Objective     PMHx:    Past Medical History:   Diagnosis Date    Anxiety     Asthma     Common cold     [facility] [address]     Depression     Diabetes mellitus (HCC)     Emphysema     Fatty liver     H/O: pneumonia FEB. 2014    Hiatal hernia     Hyperlipidemia     Hypertension     Mitral valve prolapse     Psychosis     SOB (shortness of breath) on exertion     Spinal headache     Stroke ([facility] [address] 2013    Mild Lt side    Upper respiratory symptom        Social History:    Social History     Tobacco Use    Smoking status: Passive Smoke Exposure - Never Smoker    Smokeless tobacco: Never Used    Tobacco comment: socially smoked when playing cards   Substance Use Topics    Alcohol use: No     Alcohol/week: 0.0 oz     Comment: socially       Pertinent Labs:    Lab Results   Component Value Date    LABA1C 6.8 03/18/2019    LABA1C 9.2 12/26/2018    LABA1C 10.5 (H) 10/05/2018     Lab Results   Component Value Date    CHOL 169 10/05/2018    TRIG 118 10/05/2018    HDL 63 10/05/2018     Lab Results   Component Value Date    CREATININE 0.93 (H) 10/05/2018    BUN 19 10/05/2018     10/05/2018    K 4.4 10/05/2018     10/05/2018    CO2 22 10/05/2018     Lab Results   Component Value Date    ALT 40 10/05/2018       Weight:  Wt Readings from Last 3 Encounters:   06/12/19 216 lb 14.4 oz (98.4 kg)   05/01/19 211 lb 11.2 oz (96 kg)   03/20/19 208 lb 1.5 oz (94.4 kg)       Current medications:  Prior to Admission medications    Medication Sig Start Date End Date Taking?  Authorizing Provider   tiotropium (SPIRIVA RESPIMAT) 2.5 MCG/ACT AERS inhaler Inhale 2 puffs into the lungs daily   Yes Historical Provider, MD   metFORMIN (GLUCOPHAGE XR) 500 MG extended release tablet Take one tablet (500mg) each morning and two tablets (1000mg) each evening. 12/19/18  Yes Paige Santana   escitalopram (LEXAPRO) 10 MG tablet Take 20 mg by mouth daily    Yes Historical Provider, MD   aspirin 81 MG chewable tablet Take 81 mg by mouth   Yes Historical Provider, MD   dapagliflozin (FARXIGA) 10 MG tablet Take 10 mg by mouth 1/12/18  Yes Historical Provider, MD   metoprolol succinate (TOPROL XL) 50 MG extended release tablet Take 50 mg by mouth daily  1/12/18  Yes Historical Provider, MD   Dulaglutide (TRULICITY) 1.5 BJ/1.8TJ SOPN weekly 1/12/18  Yes Historical Provider, MD   budesonide-formoterol (SYMBICORT) 160-4.5 MCG/ACT AERO 2 puffs twice a day 5/1/18  Yes Historical Provider, MD   Cholecalciferol (VITAMIN D3) 2000 units CAPS Take by mouth daily   Yes Historical Provider, MD   insulin glargine (LANTUS) 100 UNIT/ML injection vial Inject 30 Units into the skin nightly    Yes Historical Provider, MD   losartan (COZAAR) 50 MG tablet Take 50 mg by mouth daily  10/25/17  Yes Historical Provider, MD   brexpiprazole (REXULTI) 1 MG TABS tablet Take 2 mg by mouth daily    Yes Historical Provider, MD   rosuvastatin (CRESTOR) 40 MG tablet Take 40 mg by mouth every evening   Yes Historical Provider, MD   montelukast (SINGULAIR) 10 MG tablet Take 10 mg by mouth nightly   Yes Historical Provider, MD   pantoprazole (PROTONIX) 40 MG tablet Take 1 tablet by mouth daily 7/20/16  Yes Modesto Gilbert PA-C   glipiZIDE (GLUCOTROL) 10 MG tablet Take 1 tablet by mouth 2 times daily 5/12/16  Yes Historical Provider, MD   naproxen (NAPROSYN) 500 MG tablet Take 1 tablet by mouth 2 times daily (with meals)  Patient taking differently: Take 500 mg by mouth 2 times daily (with meals) Only takes for extreme pain 5/16/16  Yes Modesto Gilbert PA-C   ONE TOUCH ULTRA TEST strip  9/23/15  Yes Historical Provider, MD   albuterol (PROVENTIL) (2.5 MG/3ML) 0.083% nebulizer solution Take 2.5 mg by nebulization every 6 hours as needed for Wheezing.    Yes Historical Provider, MD albuterol (VENTOLIN HFA) 108 (90 BASE) MCG/ACT inhaler Inhale 2 puffs into the lungs every 6 hours as needed for Wheezing. Yes Historical Provider, MD   Melatonin 5 MG CAPS Take 1 capsule by mouth nightly as needed (sleep)    Historical Provider, MD   ticagrelor (BRILINTA) 90 MG TABS tablet Take 90 mg by mouth 2 times daily  6/12/19  Historical Provider, MD   benztropine (COGENTIN) 0.5 MG tablet TAKE 1 TABLET BY MOUTH AT BEDTIME FOR 1 WEEK THEN 1 TABLET EVERY 12 HOURS THERE AFTER 9/11/17   Historical Provider, MD   FLUARIX QUADRIVALENT 0.5 ML injection inject 0.5 milliliter intramuscularly 8/4/17   Historical Provider, MD   loratadine (CLARITIN) 10 MG tablet Take 10 mg by mouth daily as needed (allergies)     Historical Provider, MD       Immunizations:   Most Recent Immunizations   Administered Date(s) Administered    Influenza Virus Vaccine 11/20/2015    Pneumococcal 13-valent Conjugate (Tiffany Habermann) 11/20/2015       Home Blood Glucose Results:     Blood glucose trends noted:        Fasting blood sugar trending up between . Patient admits to being very stressed and not checking her blood sugar like she should. Encouraged her to check blood sugar every day fasting. Encouraged resuming healthier choices for diet. Patient wants to resume making better choices as happy A1C went down below 7 and wishes it to stay there. Has follow up appt with Dr. Gabriella Art on 7/15. Assessment     A1c at goal: Yes: 6.8 on 3/18/19 by POC A1C in office. Next visit with Dr. Gabriella Art is 7/15 . Patient scheduled to have next A1c in 6 months  Blood Pressure at goal: Yes 118/66 at cardiology appt on 5/3/19  Weight at goal: No   Physical activity: 30 minutes five times per week (alternating weight training/aerobics). Does skip if has appts or has company. Physical activity at goal:   Smoking Cessation: Yes  Cholesterol at target: Yes: LDL 82  Annual eye exam completed: Yes  Comprehensive Foot Exam Completed:  Yes  Annual

## 2019-07-22 ENCOUNTER — HOSPITAL ENCOUNTER (OUTPATIENT)
Age: 64
Discharge: HOME | End: 2019-07-22
Payer: COMMERCIAL

## 2019-07-22 LAB
-: ABNORMAL
ABSOLUTE EOS #: 0.2 K/UL (ref 0–0.4)
ABSOLUTE IMMATURE GRANULOCYTE: ABNORMAL K/UL (ref 0–0.3)
ABSOLUTE LYMPH #: 2.6 K/UL (ref 1–4.8)
ABSOLUTE MONO #: 0.7 K/UL (ref 0.1–1.3)
ALBUMIN SERPL-MCNC: 4.5 G/DL (ref 3.5–5.2)
ALBUMIN/GLOBULIN RATIO: ABNORMAL (ref 1–2.5)
ALP BLD-CCNC: 69 U/L (ref 35–104)
ALT SERPL-CCNC: 24 U/L (ref 5–33)
AMORPHOUS: ABNORMAL
ANION GAP SERPL CALCULATED.3IONS-SCNC: 11 MMOL/L (ref 9–17)
AST SERPL-CCNC: 24 U/L
BACTERIA: ABNORMAL
BASOPHILS # BLD: 0 % (ref 0–2)
BASOPHILS ABSOLUTE: 0 K/UL (ref 0–0.2)
BILIRUB SERPL-MCNC: 0.58 MG/DL (ref 0.3–1.2)
BILIRUBIN URINE: NEGATIVE
BUN BLDV-MCNC: 19 MG/DL (ref 8–23)
BUN/CREAT BLD: ABNORMAL (ref 9–20)
CALCIUM SERPL-MCNC: 10.1 MG/DL (ref 8.6–10.4)
CASTS UA: ABNORMAL /LPF
CHLORIDE BLD-SCNC: 103 MMOL/L (ref 98–107)
CHOLESTEROL/HDL RATIO: 3.2
CHOLESTEROL: 180 MG/DL
CO2: 24 MMOL/L (ref 20–31)
COLOR: YELLOW
COMMENT UA: ABNORMAL
CREAT SERPL-MCNC: 0.82 MG/DL (ref 0.5–0.9)
CREATININE URINE: 74 MG/DL (ref 28–217)
CRYSTALS, UA: ABNORMAL /HPF
DIFFERENTIAL TYPE: ABNORMAL
EOSINOPHILS RELATIVE PERCENT: 3 % (ref 0–4)
EPITHELIAL CELLS UA: ABNORMAL /HPF
ESTIMATED AVERAGE GLUCOSE: 160 MG/DL
GFR AFRICAN AMERICAN: >60 ML/MIN
GFR NON-AFRICAN AMERICAN: >60 ML/MIN
GFR SERPL CREATININE-BSD FRML MDRD: ABNORMAL ML/MIN/{1.73_M2}
GFR SERPL CREATININE-BSD FRML MDRD: ABNORMAL ML/MIN/{1.73_M2}
GLUCOSE BLD-MCNC: 108 MG/DL (ref 70–99)
GLUCOSE URINE: ABNORMAL
HBA1C MFR BLD: 7.2 % (ref 4–6)
HCT VFR BLD CALC: 37.6 % (ref 36–46)
HDLC SERPL-MCNC: 56 MG/DL
HEMOGLOBIN: 12.1 G/DL (ref 12–16)
IMMATURE GRANULOCYTES: ABNORMAL %
KETONES, URINE: NEGATIVE
LDL CHOLESTEROL: 80 MG/DL (ref 0–130)
LEUKOCYTE ESTERASE, URINE: ABNORMAL
LYMPHOCYTES # BLD: 31 % (ref 24–44)
MCH RBC QN AUTO: 26 PG (ref 26–34)
MCHC RBC AUTO-ENTMCNC: 32.2 G/DL (ref 31–37)
MCV RBC AUTO: 80.7 FL (ref 80–100)
MICROALBUMIN/CREAT 24H UR: <12 MG/L
MICROALBUMIN/CREAT UR-RTO: NORMAL MCG/MG CREAT
MONOCYTES # BLD: 8 % (ref 1–7)
MUCUS: ABNORMAL
NITRITE, URINE: NEGATIVE
NRBC AUTOMATED: ABNORMAL PER 100 WBC
OTHER OBSERVATIONS UA: ABNORMAL
PDW BLD-RTO: 16 % (ref 11.5–14.9)
PH UA: 5 (ref 5–8)
PLATELET # BLD: 282 K/UL (ref 150–450)
PLATELET ESTIMATE: ABNORMAL
PMV BLD AUTO: 7.9 FL (ref 6–12)
POTASSIUM SERPL-SCNC: 4.5 MMOL/L (ref 3.7–5.3)
PROTEIN UA: NEGATIVE
RBC # BLD: 4.66 M/UL (ref 4–5.2)
RBC # BLD: ABNORMAL 10*6/UL
RBC UA: ABNORMAL /HPF
RENAL EPITHELIAL, UA: ABNORMAL /HPF
SEG NEUTROPHILS: 58 % (ref 36–66)
SEGMENTED NEUTROPHILS ABSOLUTE COUNT: 4.8 K/UL (ref 1.3–9.1)
SODIUM BLD-SCNC: 138 MMOL/L (ref 135–144)
SPECIFIC GRAVITY UA: 1.01 (ref 1–1.03)
THYROXINE, FREE: 1.1 NG/DL (ref 0.93–1.7)
TOTAL PROTEIN: 8 G/DL (ref 6.4–8.3)
TRICHOMONAS: ABNORMAL
TRIGL SERPL-MCNC: 222 MG/DL
TSH SERPL DL<=0.05 MIU/L-ACNC: 1.28 MIU/L (ref 0.3–5)
TURBIDITY: CLEAR
URINE HGB: NEGATIVE
UROBILINOGEN, URINE: NORMAL
VITAMIN D 25-HYDROXY: 56.6 NG/ML (ref 30–100)
VLDLC SERPL CALC-MCNC: ABNORMAL MG/DL (ref 1–30)
WBC # BLD: 8.3 K/UL (ref 3.5–11)
WBC # BLD: ABNORMAL 10*3/UL
WBC UA: ABNORMAL /HPF
YEAST: ABNORMAL

## 2019-07-22 PROCEDURE — 80053 COMPREHEN METABOLIC PANEL: CPT

## 2019-07-22 PROCEDURE — 82570 ASSAY OF URINE CREATININE: CPT

## 2019-07-22 PROCEDURE — 85025 COMPLETE CBC W/AUTO DIFF WBC: CPT

## 2019-07-22 PROCEDURE — 82306 VITAMIN D 25 HYDROXY: CPT

## 2019-07-22 PROCEDURE — 81001 URINALYSIS AUTO W/SCOPE: CPT

## 2019-07-22 PROCEDURE — 84439 ASSAY OF FREE THYROXINE: CPT

## 2019-07-22 PROCEDURE — 36415 COLL VENOUS BLD VENIPUNCTURE: CPT

## 2019-07-22 PROCEDURE — 84443 ASSAY THYROID STIM HORMONE: CPT

## 2019-07-22 PROCEDURE — 80061 LIPID PANEL: CPT

## 2019-07-22 PROCEDURE — 83036 HEMOGLOBIN GLYCOSYLATED A1C: CPT

## 2019-07-22 PROCEDURE — 82043 UR ALBUMIN QUANTITATIVE: CPT

## 2019-07-24 ENCOUNTER — HOSPITAL ENCOUNTER (OUTPATIENT)
Dept: PHARMACY | Age: 64
Setting detail: THERAPIES SERIES
Discharge: HOME | End: 2019-07-24
Payer: COMMERCIAL

## 2019-07-24 VITALS — BODY MASS INDEX: 35.38 KG/M2 | WEIGHT: 212.6 LBS

## 2019-07-24 PROCEDURE — 99212 OFFICE O/P EST SF 10 MIN: CPT

## 2019-07-24 NOTE — PROGRESS NOTES
(CRESTOR) 40 MG tablet Take 40 mg by mouth every evening   Yes Historical Provider, MD   montelukast (SINGULAIR) 10 MG tablet Take 10 mg by mouth nightly   Yes Historical Provider, MD   pantoprazole (PROTONIX) 40 MG tablet Take 1 tablet by mouth daily 7/20/16  Yes Violette Carvalho PA-C   glipiZIDE (GLUCOTROL) 10 MG tablet Take 1 tablet by mouth 2 times daily 5/12/16  Yes Historical Provider, MD   ONE TOUCH ULTRA TEST strip  9/23/15  Yes Historical Provider, MD   albuterol (VENTOLIN HFA) 108 (90 BASE) MCG/ACT inhaler Inhale 2 puffs into the lungs every 6 hours as needed for Wheezing. Yes Historical Provider, MD   Melatonin 5 MG CAPS Take 1 capsule by mouth nightly as needed (sleep)    Historical Provider, MD   benztropine (COGENTIN) 0.5 MG tablet TAKE 1 TABLET BY MOUTH AT BEDTIME FOR 1 WEEK THEN 1 TABLET EVERY 12 HOURS THERE AFTER 9/11/17   Historical Provider, MD   FLUARIX QUADRIVALENT 0.5 ML injection inject 0.5 milliliter intramuscularly 8/4/17   Historical Provider, MD   loratadine (CLARITIN) 10 MG tablet Take 10 mg by mouth daily as needed (allergies)     Historical Provider, MD   naproxen (NAPROSYN) 500 MG tablet Take 1 tablet by mouth 2 times daily (with meals)  Patient not taking: Reported on 7/24/2019 5/16/16   Violette Carvalho PA-C   albuterol (PROVENTIL) (2.5 MG/3ML) 0.083% nebulizer solution Take 2.5 mg by nebulization every 6 hours as needed for Wheezing. Historical Provider, MD       Immunizations:   Most Recent Immunizations   Administered Date(s) Administered    Influenza Virus Vaccine 11/20/2015    Pneumococcal Conjugate 13-valent (Estil Fore) 11/20/2015       Home Blood Glucose Results:     Blood glucose trends noted:        Fasting blood sugar sill slighly up between . Random blood sugar in office reported at 151. Patient checking other than fasting blood sugars most days with results running . Did have 302 and 260 when documents she splurged at [facility] and ate cake.    Encouraged her to check blood sugar every day fasting. Encouraged resuming healthier choices for diet once mobile meals resume. Has follow up appt with Dr. Stacie Sinclair on 10/28. Assessment     A1c at goal: Yes: 6.8 on 7/15/19 by POC A1C in office. A1C on 7/22 with blood work reported at 7.2 Next visit with Dr. Stacie Sinclair is 10/28 . Patient scheduled to have next A1c in 6 months as was within goals with endocrinology appt. Blood Pressure at goal: Yes 126/62 at endocrinology appt on 7/15/19  Weight at goal: No   Physical activity: 30 minutes five times per week (alternating weight training/aerobics). Does skip if has appts or has company. Floor/weight exercises - afraid fall off bike - balance exercises. Feels unsteady lately. Maybe TaiChi 40-60min goal of MWF   Physical activity at goal:   Smoking Cessation: Yes  Cholesterol at target: Yes: LDL 82  Annual eye exam completed: Yes  Comprehensive Foot Exam Completed: Yes  Annual urine albumin and serum creatinine: Yes    Statin: Yes    Appropriate?: Yes  Changes made:     ACE/ARB: Yes  Appropriate?: Yes  Changes made:     Aspirin: Yes  Appropriate?: Yes  Changes made:     Eating patterns:    []  My plate    []  Mediterranean diet   []  Low sodium   []  DASH diet   []  Portion control   []  Reduced calorie    []  Fast food / Restaurant  []  High glycemic index foods   []  Sugary beverages   [x]  Other     Comment: Hope mobile meals will resume tomorrow 7/25. If not will follow up. .        Current Medications Affecting Diabetes:  Dapaglifozin 10mg once a day  Dulaglutide 1.5mg subQ once weekly  Insulin glargine 30 units subQ each evening  Glipizide 10mg twice a day. Metformin XR 1000mg in the morning and 1000 mg XR in the evening    Compliant: yes  Barriers to medication therapy: no    Medications attempted in the past:  Metformin about 3 years ago with some diarrhea. Unsure if regular or extended release. Tolerating ER well.     Recent exacerbations / new problems:

## 2019-08-14 DIAGNOSIS — M25.562 ACUTE PAIN OF LEFT KNEE: Primary | ICD-10-CM

## 2019-08-15 ENCOUNTER — OFFICE VISIT (OUTPATIENT)
Dept: ORTHOPEDIC SURGERY | Age: 64
End: 2019-08-15
Payer: COMMERCIAL

## 2019-08-15 VITALS — HEIGHT: 65 IN | WEIGHT: 215 LBS | BODY MASS INDEX: 35.82 KG/M2

## 2019-08-15 DIAGNOSIS — M70.61 TROCHANTERIC BURSITIS OF BOTH HIPS: ICD-10-CM

## 2019-08-15 DIAGNOSIS — M70.62 TROCHANTERIC BURSITIS OF BOTH HIPS: ICD-10-CM

## 2019-08-15 DIAGNOSIS — M17.0 PRIMARY OSTEOARTHRITIS OF BOTH KNEES: Primary | ICD-10-CM

## 2019-08-15 PROCEDURE — 99203 OFFICE O/P NEW LOW 30 MIN: CPT | Performed by: PHYSICIAN ASSISTANT

## 2019-08-15 ASSESSMENT — ENCOUNTER SYMPTOMS
NAUSEA: 0
COLOR CHANGE: 0
COUGH: 0
RHINORRHEA: 0
EYES NEGATIVE: 1
VOMITING: 0

## 2019-08-26 ENCOUNTER — HOSPITAL ENCOUNTER (OUTPATIENT)
Dept: PHYSICAL THERAPY | Age: 64
Setting detail: THERAPIES SERIES
Discharge: HOME | End: 2019-08-26
Payer: COMMERCIAL

## 2019-08-26 PROCEDURE — 97110 THERAPEUTIC EXERCISES: CPT

## 2019-08-26 PROCEDURE — 97161 PT EVAL LOW COMPLEX 20 MIN: CPT

## 2019-08-26 ASSESSMENT — PAIN DESCRIPTION - PAIN TYPE
TYPE: ACUTE PAIN
TYPE_2: ACUTE PAIN

## 2019-08-26 ASSESSMENT — PAIN DESCRIPTION - PROGRESSION
CLINICAL_PROGRESSION_2: GRADUALLY IMPROVING
CLINICAL_PROGRESSION: GRADUALLY IMPROVING

## 2019-08-26 ASSESSMENT — PAIN DESCRIPTION - ORIENTATION
ORIENTATION: RIGHT;ANTERIOR
ORIENTATION_2: LEFT;ANTERIOR

## 2019-08-26 ASSESSMENT — PAIN DESCRIPTION - LOCATION
LOCATION: KNEE
LOCATION_2: KNEE

## 2019-08-26 ASSESSMENT — PAIN DESCRIPTION - ONSET
ONSET: SUDDEN
ONSET_2: SUDDEN

## 2019-08-26 ASSESSMENT — PAIN DESCRIPTION - INTENSITY: RATING_2: 3

## 2019-08-26 ASSESSMENT — PAIN DESCRIPTION - DESCRIPTORS
DESCRIPTORS: SHARP;STABBING
DESCRIPTORS_2: CONSTANT;SHARP;STABBING

## 2019-08-26 ASSESSMENT — PAIN DESCRIPTION - FREQUENCY: FREQUENCY: INTERMITTENT

## 2019-08-26 ASSESSMENT — PAIN DESCRIPTION - DURATION: DURATION_2: CONTINUOUS

## 2019-08-26 ASSESSMENT — PAIN SCALES - GENERAL: PAINLEVEL_OUTOF10: 0

## 2019-08-26 NOTE — PROGRESS NOTES
Rest;Repositioned  Vision/Hearing  Vision  Vision: Within Functional Limits  Hearing  Hearing: Within functional limits  Orientation  Overall Orientation Status: Within Normal Limits  Social/Functional History  Occupation: On disability    Objective  Observation/Palpation  Palpation: Tenderness to the touch was noted within the medial inferior region of the (L) knee.    Observation: (R) knee genu recurvatum  Edema: No swelling was noted at the knee joint line (39 cm)   Range of Motion  PROM RLE (degrees)  RLE PROM: WNL  RLE General PROM: Stiffness was noted at end range within all planes of the hip  AROM RLE (degrees)  RLE AROM: WNL  PROM LLE (degrees)  LLE PROM: WNL  LLE General PROM: Stiffness was noted at end range within all planes of the hip  AROM LLE (degrees)  LLE AROM : WNL  Strength  Strength RLE  Strength RLE: Exception  R Hip Flexion: 3/5(Pain at end range)  R Hip Extension: 3-/5  R Hip ABduction: 3-/5  R Hip ADduction: 3-/5  R Hip Internal Rotation: 5/5  R Hip External Rotation: 5/5  R Knee Flexion: 4/5  R Knee Extension: 3-/5  R Ankle Dorsiflexion: 5/5  R Ankle Plantar flexion: 5/5  R Ankle Inversion: 5/5  R Ankle Eversion: 5/5  Strength LLE  Strength LLE: Exception  L Hip Flexion: 3/5(Pain at end range)  L Hip Extension: 3/5  L Hip ABduction: 3-/5  L Hip ADduction: 2+/5  L Hip Internal Rotation: 5/5  L Hip External Rotation: 5/5  L Knee Flexion: 4/5  L Knee Extension: 3-/5  L Ankle Dorsiflexion: 4/5  L Ankle Plantar Flexion: 4/5  L Ankle Inversion: 4/5  L Ankle Eversion: 4/5  Tone  Tone RLE  RLE Tone: Normotonic  Tone LLE  LLE Tone: Normotonic  Motor Control  Gross Motor?: WNL  Additional Measures  Flexibility: Hamstring and Hip Flexor tightness was noted (B)   Special Tests: (+) patella grind test was noted (B)   Other: Normal mobility was grossly noted with palpation within the patellofemoral joint   Sensation  Overall Sensation Status: WNL(With Light Touch L2 - S1)  Ambulation  Ambulation?: Yes  Ambulation 1  Surface: level tile  Device: No Device  Assistance: Independent  Quality of Gait: Lateral sway at the trunk, short shuffling gait with a decrease step length noted on the (R) LE   Gait Deviations: Shuffles; Slow Hazel;Decreased step length  Distance: 200 ft   Balance  Sitting - Static: Good  Sitting - Dynamic: Good  Standing - Static: Good  Standing - Dynamic: Good  Comments: Unable to perform SLS on either leg with eyes open. Assessment  Conditions Requiring Skilled Therapeutic Intervention  Body structures, Functions, Activity limitations: Decreased functional mobility ; Decreased ADL status; Decreased strength;Decreased balance  Treatment Diagnosis: (L) knee pain with hip/quad weakness  Prognosis: Good  Decision Making: Low Complexity  History: No personal factors were apparent that may have an effect on this patient's plan of care. Exam: NPRS - 2-3 (B) knees, decreased strength (B) LE, antalgic gait, LEFS - 36 points  Clinical Presentation: Patient's symptoms are evolving. Outcomes Score  LEFS Total Score: 36 (08/26/19 0900)    Goals  Short term goals  Time Frame for Short term goals: 6 visits   Short term goal 1: Patient will report an average pain 1-2/10 within her (L) knee at rest/ADLs. Short term goal 2: Patient will demonstrate knowledge of fall prevention   Long term goals  Time Frame for Long term goals : 12 visits   Long term goal 1: Patient will report an average pain 0-1/10 within her (L) knee at rest/ADLs. Long term goal 2: Patient will demonstrate a 5/5 MMT within all her involved planes to assist with (I) function. Long term goal 3: Establish a normal gait pattern   Long term goal 4: LEFS improved by 9 points (Dago Novoa [employer] 112)  Long term goal 5: Patient will be independent with home exercise program.     Patient Goals: To strengthen the muscles in my legs so I can get out of my chair, climb stairs and keep from falling.      Comments/Assessment: Patient would benefit from Vasopneumatic Device     []  Neuro Re-Ed       []  Other       Total Treatment Time: 60 2

## 2019-09-04 ENCOUNTER — HOSPITAL ENCOUNTER (OUTPATIENT)
Dept: PHARMACY | Age: 64
Setting detail: THERAPIES SERIES
Discharge: HOME | End: 2019-09-04
Payer: COMMERCIAL

## 2019-09-04 ENCOUNTER — TELEPHONE (OUTPATIENT)
Dept: PHARMACY | Age: 64
End: 2019-09-04

## 2019-09-04 VITALS — WEIGHT: 213.1 LBS | BODY MASS INDEX: 35.46 KG/M2

## 2019-09-04 PROCEDURE — 99212 OFFICE O/P EST SF 10 MIN: CPT

## 2019-09-04 RX ORDER — METFORMIN HYDROCHLORIDE 500 MG/1
1000 TABLET, EXTENDED RELEASE ORAL 2 TIMES DAILY WITH MEALS
Qty: 120 TABLET | Refills: 3 | OUTPATIENT
Start: 2019-09-04 | End: 2020-09-09 | Stop reason: SDUPTHER

## 2019-09-04 RX ORDER — FLUCONAZOLE 150 MG/1
150 TABLET ORAL WEEKLY
COMMUNITY
Start: 2019-09-05 | End: 2019-09-12

## 2019-09-04 NOTE — PROGRESS NOTES
a referral to go to physical therapist to strengthen her legs to improve. Has not had any falls since last appt. Patient asks about taking osteo biflex as well as Ocuvite. Discussed how this would not cause any issues and if patient wants to try it would be ok. Patient has been regularly getting mobile meals for lunch. Admits to not eating as much for breakfast but does not skip. Did go to [employer] and was having some fruit or vege for breakfast.  Otherwise may have just a piece of toast.   Patient has not been snacking much other than a few banana chips a few days ago. Has also been having more salad since last visit. For dinner patient having frozen dinners (26-30 carbs) typically but also have good amount of fiber. Patient has had a couple hypoglycemic episodes since last appt with values in 60's. Upon review of patient glucose log it appears patient has been skipping meals. Patient reports that when blood sugar is on the higher side she sometimes skips the meal to try to lower it. Discussed not to skip the meal but rather to eat a healthier smaller meal but not skip the meal. Patient acknowledges and will try to remember. Patient continues to have vaginal discharge and reports still having yeast infection. Patient did receive treatment but states it was only two doses and 2nd dose patient vomited and does not feel she kept any of it down. Patient does have two more doses of fluconazole 150mg. Plans to take today or tomorrow then next week. Has not taken today as of yet because still a little nauseated. There is an increased risk of UTI/yeast infections for people on  Farxiga. May have to consider changing to another class of medication if yeast infection continues. Patient brings in all pill bottles and does have rx for benztropine. States taking everything in her bag.       []  Missed doses   []  Emergency Room Visit    []  Medication changes  []  Hospitalization   []

## 2019-09-16 ENCOUNTER — HOSPITAL ENCOUNTER (OUTPATIENT)
Dept: PHYSICAL THERAPY | Age: 64
Setting detail: THERAPIES SERIES
Discharge: HOME | End: 2019-09-16
Payer: COMMERCIAL

## 2019-09-16 PROCEDURE — 97112 NEUROMUSCULAR REEDUCATION: CPT

## 2019-09-16 PROCEDURE — 97110 THERAPEUTIC EXERCISES: CPT

## 2019-09-16 ASSESSMENT — PAIN DESCRIPTION - PROGRESSION: CLINICAL_PROGRESSION: GRADUALLY IMPROVING

## 2019-09-16 NOTE — PROGRESS NOTES
BLE  Exercise 4: Standing Calf Stretch: 30\" x3  Exercise 5: Seated HS stretch with stool/chair: 30\" x3  Exercise 6: Tandem Stance on balance foam: 30\" x3  Exercise 7: Single Leg Stance BLE: 15' x3  Exercise 8: Stair Stepper: ~4 min(to increase endurance on the steps)    Comment  Comments: History of (B) knee pain due a fall that occurred while mowing her grass. Patient stated that she landed directly on both knees and her face on concrete. (+) LOC/rescue squad came but did not go to the ER. Followed up with PCP due to the fall and was referred to Dr. Imelda Fisher -plain films and outpt PT ordered. Assessment  Assessment: Patient fearful of falling, specifically on her basement steps. PTA instructed this patient in the above exercises to increase her strength, endurance, and flexibility. Patient reported that she was challenged most by the [employer] and that she was fatigued after a few minutes, but that she wants to continue to use the machine to increase her endurance.      Therapy Time:  Time In: 1524  Time Out: 1630  Minutes: 66      Treatment Charges: Minutes Units   []  Ultrasound     []  Electrical-Stim     []  Iontophoresis     []  Traction     []  Massage       []  Eval     []  Gait     []  Vasopneumatic Device     [x]  Ther Exercise 46 3   []  Manual Therapy       []  Ther Activities       []  Aquatics     [x]  Neuro Re-Ed 9 1   []  Other       Total Treatment Time: 54 Sanjiv 79, PTA

## 2019-09-18 ENCOUNTER — HOSPITAL ENCOUNTER (OUTPATIENT)
Dept: PHYSICAL THERAPY | Age: 64
Setting detail: THERAPIES SERIES
Discharge: HOME | End: 2019-09-18
Payer: COMMERCIAL

## 2019-09-18 PROCEDURE — 97110 THERAPEUTIC EXERCISES: CPT

## 2019-09-18 ASSESSMENT — PAIN DESCRIPTION - INTENSITY: RATING_2: 0

## 2019-09-18 ASSESSMENT — PAIN DESCRIPTION - DESCRIPTORS: DESCRIPTORS: SHARP;STABBING

## 2019-09-18 ASSESSMENT — PAIN SCALES - GENERAL: PAINLEVEL_OUTOF10: 2

## 2019-09-18 ASSESSMENT — PAIN DESCRIPTION - FREQUENCY: FREQUENCY: INTERMITTENT

## 2019-09-18 ASSESSMENT — PAIN DESCRIPTION - LOCATION
LOCATION: KNEE
LOCATION_2: KNEE

## 2019-09-18 ASSESSMENT — PAIN DESCRIPTION - PAIN TYPE: TYPE: ACUTE PAIN

## 2019-09-18 ASSESSMENT — PAIN DESCRIPTION - ONSET: ONSET: SUDDEN

## 2019-09-18 ASSESSMENT — PAIN DESCRIPTION - ORIENTATION
ORIENTATION: LEFT;ANTERIOR
ORIENTATION_2: RIGHT

## 2019-09-18 NOTE — PROGRESS NOTES
Physical Therapy  Daily Treatment Note  Date: 2019  Patient Name: Traci Moses  MRN: [id number]     :   1955    General  Chart Reviewed: Yes  Response To Previous Treatment: Not applicable  Family / Caregiver Present: No  Referring Practitioner: Eladio LISA  PT Visit Information  Onset Date: 19  PT Insurance Information: [employer]  Total # of Visits Approved: 12  Total # of Visits to Date: 3  Plan of Care/Certification Expiration Date: 19  No Show: 0  Canceled Appointment: 0  Progress Note Counter: [employer] Authorization 19 - 19  General Comment  Comments: History of (B) knee pain due a fall that occurred while mowing her grass. Patient stated that she landed directly on both knees and her face on concrete. (+) LOC/rescue squad came but did not go to the ER. Followed up with PCP due to the fall and was referred to Dr. Naima Cage -plain films and outpt PT ordered. Subjective  Pain Screening  Patient Currently in Pain: Yes  Pain Assessment  Pain Assessment: 0-10  Pain Level: 2  Patient's Stated Pain Goal: 2  Pain Type: Acute pain  Pain Location: Knee  Pain Orientation: Left; Anterior  Pain Descriptors: Monna Rile; Stabbing  Pain Frequency: Intermittent  Pain Onset: Sudden  Non-Pharmaceutical Pain Intervention(s): Repositioned; Rest  Response to Pain Intervention: Patient Satisfied  Multiple Pain Sites: Yes  Pain 2  Pain Rating 2: 0  Patient's Stated Pain Goal 2: no pain  Pain Location 2: Knee  Pain Orientation 2: Right     Treatment Activities  Exercises  Exercise 1: Step Ups F/L 8 inch 20 reps with each leg each direction  Exercise 2: Supine SLR 10 reps with each leg  Exercise 3: Side lying Hip Abduction 5 reps x 2 sets with each leg   Exercise 4: Prone Hip Extension 5 reps x 2 sets with each leg   Exercise 5: Leg Press 25 lbs 20 reps x 2 sets with each leg (single)     Activity Tolerance  Patient tolerated treatment well      Assessment  Conditions Requiring Skilled

## 2019-09-20 ENCOUNTER — OFFICE VISIT (OUTPATIENT)
Dept: PODIATRY | Age: 64
End: 2019-09-20
Payer: COMMERCIAL

## 2019-09-20 VITALS — HEIGHT: 66 IN | BODY MASS INDEX: 34.23 KG/M2 | WEIGHT: 213 LBS

## 2019-09-20 DIAGNOSIS — S96.911A STRAIN OF RIGHT FOOT, INITIAL ENCOUNTER: Primary | ICD-10-CM

## 2019-09-20 DIAGNOSIS — M79.671 PAIN IN RIGHT FOOT: ICD-10-CM

## 2019-09-20 PROCEDURE — 99203 OFFICE O/P NEW LOW 30 MIN: CPT | Performed by: PODIATRIST

## 2019-09-20 PROCEDURE — 1036F TOBACCO NON-USER: CPT | Performed by: PODIATRIST

## 2019-09-20 PROCEDURE — G8427 DOCREV CUR MEDS BY ELIG CLIN: HCPCS | Performed by: PODIATRIST

## 2019-09-20 PROCEDURE — G8417 CALC BMI ABV UP PARAM F/U: HCPCS | Performed by: PODIATRIST

## 2019-09-20 PROCEDURE — 3017F COLORECTAL CA SCREEN DOC REV: CPT | Performed by: PODIATRIST

## 2019-09-20 ASSESSMENT — ENCOUNTER SYMPTOMS
COLOR CHANGE: 0
NAUSEA: 0
DIARRHEA: 0
BACK PAIN: 0
SHORTNESS OF BREATH: 0

## 2019-09-20 NOTE — PROGRESS NOTES
Historical Provider, MD   escitalopram (LEXAPRO) 20 MG tablet Take 20 mg by mouth daily    Yes Historical Provider, MD   aspirin 81 MG chewable tablet Take 81 mg by mouth   Yes Historical Provider, MD   dapagliflozin (FARXIGA) 10 MG tablet Take 10 mg by mouth 1/12/18  Yes Historical Provider, MD   metoprolol succinate (TOPROL XL) 50 MG extended release tablet Take 50 mg by mouth daily  1/12/18  Yes Historical Provider, MD   Dulaglutide (TRULICITY) 1.5 SF/1.1SA SOPN weekly 1/12/18  Yes Historical Provider, MD   budesonide-formoterol (SYMBICORT) 160-4.5 MCG/ACT AERO 2 puffs twice a day 5/1/18  Yes Historical Provider, MD   Cholecalciferol (VITAMIN D3) 2000 units CAPS Take by mouth daily   Yes Historical Provider, MD   insulin glargine (LANTUS) 100 UNIT/ML injection vial Inject 30 Units into the skin nightly    Yes Historical Provider, MD   benztropine (COGENTIN) 0.5 MG tablet TAKE 1 TABLET BY MOUTH AT BEDTIME FOR 1 WEEK THEN 1 TABLET EVERY 12 HOURS THERE AFTER 9/11/17  Yes Historical Provider, MD   FLUARIX QUADRIVALENT 0.5 ML injection inject 0.5 milliliter intramuscularly 8/4/17  Yes Historical Provider, MD   losartan (COZAAR) 50 MG tablet Take 50 mg by mouth daily  10/25/17  Yes Historical Provider, MD   brexpiprazole (REXULTI) 4 MG TABS tablet Take 2 mg by mouth daily    Yes Historical Provider, MD   rosuvastatin (CRESTOR) 40 MG tablet Take 40 mg by mouth every evening   Yes Historical Provider, MD   montelukast (SINGULAIR) 10 MG tablet Take 10 mg by mouth nightly   Yes Historical Provider, MD   pantoprazole (PROTONIX) 40 MG tablet Take 1 tablet by mouth daily 7/20/16  Yes Danisha Brice PA-C   loratadine (CLARITIN) 10 MG tablet Take 10 mg by mouth daily as needed (allergies)    Yes Historical Provider, MD   glipiZIDE (GLUCOTROL) 10 MG tablet Take 1 tablet by mouth 2 times daily 5/12/16  Yes Historical Provider, MD   naproxen (NAPROSYN) 500 MG tablet Take 1 tablet by mouth 2 times daily (with meals) 5/16/16  Yes

## 2019-09-23 ENCOUNTER — HOSPITAL ENCOUNTER (OUTPATIENT)
Dept: PHYSICAL THERAPY | Age: 64
Setting detail: THERAPIES SERIES
Discharge: HOME | End: 2019-09-23
Payer: COMMERCIAL

## 2019-09-23 PROCEDURE — 97110 THERAPEUTIC EXERCISES: CPT

## 2019-09-24 NOTE — PROGRESS NOTES
Electrical-Stim     []  Iontophoresis     []  Traction     []  Massage       []  Eval     []  Gait     []  Vasopneumatic Device     [x]  Ther Exercise  45 3    []  Manual Therapy       []  Ther Activities       []  Aquatics     []  Neuro Re-Ed       []  Other       Total Treatment Time: Janeen 56, PTA

## 2019-09-25 ENCOUNTER — HOSPITAL ENCOUNTER (OUTPATIENT)
Dept: PHYSICAL THERAPY | Age: 64
Setting detail: THERAPIES SERIES
Discharge: HOME | End: 2019-09-25
Payer: COMMERCIAL

## 2019-09-25 PROCEDURE — 97110 THERAPEUTIC EXERCISES: CPT

## 2019-09-25 ASSESSMENT — PAIN DESCRIPTION - PAIN TYPE: TYPE_2: ACUTE PAIN

## 2019-09-25 ASSESSMENT — PAIN DESCRIPTION - ONSET: ONSET_2: SUDDEN

## 2019-09-25 ASSESSMENT — PAIN DESCRIPTION - INTENSITY: RATING_2: 3

## 2019-09-25 ASSESSMENT — PAIN DESCRIPTION - ORIENTATION: ORIENTATION: RIGHT

## 2019-09-25 ASSESSMENT — PAIN DESCRIPTION - DESCRIPTORS: DESCRIPTORS_2: CONSTANT;SHARP;STABBING

## 2019-09-25 ASSESSMENT — PAIN DESCRIPTION - PROGRESSION: CLINICAL_PROGRESSION_2: NOT CHANGED

## 2019-09-25 ASSESSMENT — PAIN DESCRIPTION - LOCATION
LOCATION_2: KNEE
LOCATION: KNEE

## 2019-09-25 ASSESSMENT — PAIN DESCRIPTION - DURATION: DURATION_2: CONTINUOUS

## 2019-09-25 NOTE — PROGRESS NOTES
Outcomes Score  LEFS Total Score: 42 (09/25/19 0900)    Goals  Short term goals  Time Frame for Short term goals: 6 visits   Short term goal 1: Patient will report an average pain 1-2/10 within her knee(s) at rest/ADLs. (Not Met)  Short term goal 2: Patient will demonstrate knowledge of fall prevention. (Met)  Long term goals  Time Frame for Long term goals : 12 visits   Long term goal 1: Patient will report an average pain 0-1/10 within her knee(s) at rest/ADLs. (Not Met)  Long term goal 2: Patient will demonstrate a 5/5 MMT within all her involved planes to assist with (I) function. (Not Met)  Long term goal 3: Establish a normal gait pattern. (Not Met)  Long term goal 4: LEFS improved by 9 points (Dago Nov[employer] 112) (Not Met)  Long term goal 5: Patient will be independent with home exercise program. (Met)    Patient Goals   Patient goals : To strengthen the muscles in my legs so I can get out of my chair, climb stairs and keep from falling. Patient demonstrated improvement from condition with _1_ of_2__ short term goals   Patient demonstrated improvement from condition with _1_ of_5__ long term goals     Comments/Function: Slight improvement was shown LEFS improved by 6 points (not a significant difference)    PT Education: [x] Home Exercise Program  Comprehension [x] Good [] Fair [] Poor  [x] Plans/Goals developed/discussed with pt/family [] Other    Recommendations: Discontinue to physical therapy per insurance limitations. Patient was provided a written home exercise program to further address her functional deficits noted above. [x] Patient is Discharged At This Time Unless Further Orders Are Received. New Script Needed To Continue Treatment: [x] No   [] Yes  (visits left on current prescription:       7        ) Please sign orders at the bottom of this page and return by faxing. Thank you.      Current Treatment:  [x] Therapeutic Exercise    [] Modalities                [] Work Conditioning  [] Therapeutic

## 2019-09-30 ENCOUNTER — APPOINTMENT (OUTPATIENT)
Dept: PHYSICAL THERAPY | Age: 64
End: 2019-09-30
Payer: COMMERCIAL

## 2019-10-09 ENCOUNTER — HOSPITAL ENCOUNTER (OUTPATIENT)
Dept: PHARMACY | Age: 64
Setting detail: THERAPIES SERIES
Discharge: HOME | End: 2019-10-09
Payer: COMMERCIAL

## 2019-10-09 VITALS — BODY MASS INDEX: 35.01 KG/M2 | WEIGHT: 216.9 LBS

## 2019-10-09 PROCEDURE — 99212 OFFICE O/P EST SF 10 MIN: CPT

## 2019-10-16 ENCOUNTER — OFFICE VISIT (OUTPATIENT)
Dept: ORTHOPEDIC SURGERY | Age: 64
End: 2019-10-16
Payer: COMMERCIAL

## 2019-10-16 ENCOUNTER — HOSPITAL ENCOUNTER (OUTPATIENT)
Dept: PHARMACY | Age: 64
Setting detail: THERAPIES SERIES
Discharge: HOME | End: 2019-10-16
Payer: COMMERCIAL

## 2019-10-16 ENCOUNTER — HOSPITAL ENCOUNTER (OUTPATIENT)
Dept: WOMENS IMAGING | Age: 64
Discharge: HOME | End: 2019-10-18
Payer: COMMERCIAL

## 2019-10-16 VITALS — WEIGHT: 216 LBS | BODY MASS INDEX: 34.86 KG/M2

## 2019-10-16 DIAGNOSIS — M70.61 TROCHANTERIC BURSITIS OF BOTH HIPS: Primary | ICD-10-CM

## 2019-10-16 DIAGNOSIS — Z12.31 VISIT FOR SCREENING MAMMOGRAM: ICD-10-CM

## 2019-10-16 DIAGNOSIS — M70.62 TROCHANTERIC BURSITIS OF BOTH HIPS: Primary | ICD-10-CM

## 2019-10-16 DIAGNOSIS — M17.0 PRIMARY OSTEOARTHRITIS OF BOTH KNEES: ICD-10-CM

## 2019-10-16 PROCEDURE — 77063 BREAST TOMOSYNTHESIS BI: CPT

## 2019-10-16 PROCEDURE — G8417 CALC BMI ABV UP PARAM F/U: HCPCS | Performed by: PHYSICIAN ASSISTANT

## 2019-10-16 PROCEDURE — 99212 OFFICE O/P EST SF 10 MIN: CPT

## 2019-10-16 PROCEDURE — 99213 OFFICE O/P EST LOW 20 MIN: CPT | Performed by: PHYSICIAN ASSISTANT

## 2019-10-16 PROCEDURE — 3017F COLORECTAL CA SCREEN DOC REV: CPT | Performed by: PHYSICIAN ASSISTANT

## 2019-10-16 PROCEDURE — 20610 DRAIN/INJ JOINT/BURSA W/O US: CPT | Performed by: PHYSICIAN ASSISTANT

## 2019-10-16 PROCEDURE — G8427 DOCREV CUR MEDS BY ELIG CLIN: HCPCS | Performed by: PHYSICIAN ASSISTANT

## 2019-10-16 PROCEDURE — G8484 FLU IMMUNIZE NO ADMIN: HCPCS | Performed by: PHYSICIAN ASSISTANT

## 2019-10-16 PROCEDURE — 1036F TOBACCO NON-USER: CPT | Performed by: PHYSICIAN ASSISTANT

## 2019-10-16 RX ORDER — LIDOCAINE HYDROCHLORIDE 10 MG/ML
2 INJECTION, SOLUTION EPIDURAL; INFILTRATION; INTRACAUDAL; PERINEURAL ONCE
Status: COMPLETED | OUTPATIENT
Start: 2019-10-16 | End: 2019-10-16

## 2019-10-16 RX ORDER — BETAMETHASONE SODIUM PHOSPHATE AND BETAMETHASONE ACETATE 3; 3 MG/ML; MG/ML
12 INJECTION, SUSPENSION INTRA-ARTICULAR; INTRALESIONAL; INTRAMUSCULAR; SOFT TISSUE ONCE
Status: COMPLETED | OUTPATIENT
Start: 2019-10-16 | End: 2019-10-16

## 2019-10-16 RX ADMIN — BETAMETHASONE SODIUM PHOSPHATE AND BETAMETHASONE ACETATE 12 MG: 3; 3 INJECTION, SUSPENSION INTRA-ARTICULAR; INTRALESIONAL; INTRAMUSCULAR; SOFT TISSUE at 12:26

## 2019-10-16 RX ADMIN — LIDOCAINE HYDROCHLORIDE 2 ML: 10 INJECTION, SOLUTION EPIDURAL; INFILTRATION; INTRACAUDAL; PERINEURAL at 12:27

## 2019-10-16 ASSESSMENT — ENCOUNTER SYMPTOMS
COUGH: 0
COLOR CHANGE: 0
RHINORRHEA: 0
VOMITING: 0
EYES NEGATIVE: 1
NAUSEA: 0

## 2019-10-28 LAB — HBA1C MFR BLD: 6.8 %

## 2019-11-04 ENCOUNTER — TELEPHONE (OUTPATIENT)
Dept: PHARMACY | Age: 64
End: 2019-11-04

## 2019-11-19 ENCOUNTER — HOSPITAL ENCOUNTER (OUTPATIENT)
Dept: PHARMACY | Age: 64
Setting detail: THERAPIES SERIES
Discharge: HOME | End: 2019-11-19
Payer: COMMERCIAL

## 2019-11-19 VITALS — BODY MASS INDEX: 35.06 KG/M2 | WEIGHT: 217.2 LBS

## 2019-11-19 PROCEDURE — 99213 OFFICE O/P EST LOW 20 MIN: CPT

## 2019-12-03 ENCOUNTER — HOSPITAL ENCOUNTER (OUTPATIENT)
Dept: PHARMACY | Age: 64
Setting detail: THERAPIES SERIES
Discharge: HOME | End: 2019-12-03
Payer: COMMERCIAL

## 2019-12-03 VITALS — BODY MASS INDEX: 35.38 KG/M2 | WEIGHT: 219.2 LBS

## 2019-12-03 PROCEDURE — 99212 OFFICE O/P EST SF 10 MIN: CPT

## 2020-01-15 ENCOUNTER — HOSPITAL ENCOUNTER (OUTPATIENT)
Dept: PHARMACY | Age: 65
Setting detail: THERAPIES SERIES
Discharge: HOME | End: 2020-01-15
Payer: COMMERCIAL

## 2020-01-15 VITALS — WEIGHT: 216.4 LBS | BODY MASS INDEX: 34.93 KG/M2

## 2020-01-15 PROCEDURE — 99212 OFFICE O/P EST SF 10 MIN: CPT

## 2020-01-15 RX ORDER — ALBUTEROL SULFATE 90 UG/1
2 AEROSOL, METERED RESPIRATORY (INHALATION) EVERY 6 HOURS PRN
COMMUNITY

## 2020-01-15 RX ORDER — ROPINIROLE 0.5 MG/1
0.5 TABLET, FILM COATED ORAL NIGHTLY
COMMUNITY
End: 2020-07-13 | Stop reason: SINTOL

## 2020-01-15 NOTE — PROGRESS NOTES
since. Patient feels her diet has been pretty typical but does report making tacos yesterday and overate. BS this am was 197. Has not been checking her blood sugar as often due to not feeling well and not wanting to check blood sugars. Discussed how it is even more important to check blood sugar when he is ill. Patient states she understands but was just too worn out. Patient even reports not washing her dishes from Thanksgiving - has in bag on back porch. Patient has been back to checking more regularly for the last week. Since stopping the Farxiga vaginal discharge is almost completely gone. Patient continues on Lantus 35 units a day and metformin XR 1000 mg  In the morning and 1000 mg  XR in the evening and tolerating well. Also on Trulicity. Patient states next physician appt Feb 4 with PCP and Feb 12 with Dr. Dylan Barnett. Wearing her shoes does seem to help so she has been wearing every day. Foot pain has been much better controlled. Due to patient not feeling well has not been exercising. Patient does plan to get back to it as did get a yoga mat for her birthday/holidays. Goal of MWF stationary bike for 45 min and Tues/Thur floor exercises (30-45min)    Patient has still not started osteo biflex as of yet. Patient will let us know if she starts. Also considering Ocuvite. Patient did start eye drops for glaucoma but unsure of name and no record in [facility]. Patient will bring in at next visit. Patient reports splurging over holidays but intends to get back to normal diet. Does have meals on wheels she has delivered. Plans to get back to eating those to help with diet. []  Missed doses   []  Emergency Room Visit    []  Medication changes  []  Hospitalization   [x]  Diet changes   [x]  Acute illness   []  Activity changes      Symptoms of hypoglycemia - One episode of 53 but no symptoms just could not sleep.   Patient ate and felt better but did not recheck blood daily  10/25/17  Yes Historical Provider, MD   brexpiprazole (REXULTI) 4 MG TABS tablet Take 2 mg by mouth daily    Yes Historical Provider, MD   rosuvastatin (CRESTOR) 40 MG tablet Take 40 mg by mouth every evening   Yes Historical Provider, MD   montelukast (SINGULAIR) 10 MG tablet Take 10 mg by mouth nightly   Yes Historical Provider, MD   pantoprazole (PROTONIX) 40 MG tablet Take 1 tablet by mouth daily 7/20/16  Yes Alexandra Alicea PA-C   loratadine (CLARITIN) 10 MG tablet Take 10 mg by mouth daily as needed (allergies)    Yes Historical Provider, MD   naproxen (NAPROSYN) 500 MG tablet Take 1 tablet by mouth 2 times daily (with meals)  Patient taking differently: Take 500 mg by mouth 2 times daily as needed for Pain Takes with meals 5/16/16  Yes Alexandra Alicea PA-C   ONE TOUCH ULTRA TEST strip  9/23/15  Yes Historical Provider, MD   albuterol (PROVENTIL) (2.5 MG/3ML) 0.083% nebulizer solution Take 2.5 mg by nebulization every 6 hours as needed for Wheezing. Yes Historical Provider, MD   FLUARIX QUADRIVALENT 0.5 ML injection inject 0.5 milliliter intramuscularly 8/4/17   Historical Provider, MD   benztropine (COGENTIN) 0.5 MG tablet TAKE 1 TABLET BY MOUTH AT BEDTIME FOR 1 WEEK THEN 1 TABLET EVERY 12 HOURS THERE AFTER 9/11/17 1/15/20  Historical Provider, MD       Immunizations:   Most Recent Immunizations   Administered Date(s) Administered    Influenza Virus Vaccine 11/20/2015    Pneumococcal Conjugate 13-valent (Ya Pelayo) 11/20/2015       Home Blood Glucose Results:     Blood glucose trends noted:         Fasting blood sugars since last visit 197,136,215,101,105,200,133,73,119. Blood sugars have been a bit higher due to acute respiratory illness and prednisone  Other blood sugars:    Before Lunch:  [phone],156  Before Dinner: 926,109,034,286    Patient only had one hypoglycemic episode at 2am of 53. Patient was not sleeping well and got up to check blood sugar.  Ate cheese/potato chips and went back to bed. Fasting blood sugar following morning was elevated likely due to overdoing. Encouraged patient to always check about 15 min after eating with low blood sugar. Patient has also had elevated blood sugars with illness and prednisone. Better controlled now. Patient does not think her appetite was affected by prednisone. Patient may start another course of prednisone. Asked patient to call if she gets any more than a 5 day prednisone course today. Assessment     A1c at goal: Yes: 6.8 on 10/28/19 by POC A1C in Dr. Anastacio Powers office. Blood Pressure at goal: Yes 108/62 at cardiology appt on 11/4/19  Weight at goal: No   Physical activity: Has been off lately due to [facility] and illness and being very tired but will try to get back on track: 45-65 minutes most days of the week (alternating weight training/aerobics). Physical activity at goal: Yes  Smoking Cessation: Yes  Cholesterol at target: Yes: LDL 82  Annual eye exam completed: Yes  Comprehensive Foot Exam Completed: Yes  Annual urine albumin and serum creatinine: Yes    Statin: Yes    Appropriate?: Yes  Changes made:     ACE/ARB: Yes  Appropriate?: Yes  Changes made:     Aspirin: Yes  Appropriate?: Yes  Changes made:     Eating patterns:    []  My plate    []  Mediterranean diet   []  Low sodium   []  DASH diet   []  Portion control   []  Reduced calorie    []  Fast food / Restaurant  []  High glycemic index foods   []  Sugary beverages   [x]  Other     Comment:     Current Medications Affecting Diabetes:  Dulaglutide 1.5mg subQ once weekly  Insulin glargine 35 units subQ each evening  Metformin XR 1000mg in the morning and 1000 mg XR in the evening    Compliant: Yes  Barriers to medication therapy: None - continues to use alarm on phone    Medications attempted in the past:  Metformin about 3 years ago with some diarrhea. Unsure if regular or extended release. Tolerating ER well.     Recent exacerbations / new problems:  Yeast infections almost completely resolved. Last office dictation reviewed: yes        type 2 DM under acceptable control as evidenced by A1C maintaining at 6.8 via [address] on 10/28     Plan     Physician Follow-up:  Every 3 months - next visit 2/4/19 and 2/12/19    Medication Management Follow-up:   Diabetes Service   Follow up in 2 months. Will see endocrinologist in one month and follow with us one month after. Patient to call with blood sugars trending up not on prednisone. Patient encouraged to continue with healthier options for eating. Continue to document blood sugars - new glucose book given to patient. Continue:  Dulaglutide (Trulicity) 7.0MG subQ once weekly  Metformin XR 1000mg in the morning and 1000 mg XR in the evening  Insulin glargine (Lantus) 35 units subQ each evening    Continue current therapy as recent elevated blood sugars most likely due to Holiday eating / acute illness and prednisone. Patient to resume more normal diet/exercise. If has another steroid burst patient will call if more than 5 days. Patient given parameters to call if blood sugars are elevated and we can consider increasing insulin glargine. Next A1C will be done when patient sees Dr Jacobo Lee on 2/12/2020.     Electronically signed by Alejandra Aragon RPH on 1/15/2020 at 11:01 AM

## 2020-02-12 LAB
CHOLESTEROL, TOTAL: 145 MG/DL
CHOLESTEROL/HDL RATIO: 2.7
CREAT SERPL-MCNC: 1.11 MG/DL
CREATININE URINE: 86.92 MG/DL
HBA1C MFR BLD: 7.5 %
HDLC SERPL-MCNC: 54 MG/DL (ref 35–70)
LDL CHOLESTEROL CALCULATED: 59 MG/DL (ref 0–160)
LDL CHOLESTEROL DIRECT: NORMAL
MICROALBUMIN/CREAT 24H UR: 13.9 MG/G{CREAT}
TRIGL SERPL-MCNC: 158 MG/DL
VITAMIN B-12: 268
VLDLC SERPL CALC-MCNC: 32 MG/DL

## 2020-02-17 ENCOUNTER — TELEPHONE (OUTPATIENT)
Dept: PHARMACY | Age: 65
End: 2020-02-17

## 2020-02-17 NOTE — TELEPHONE ENCOUNTER
Called patient to change appt date as writer will be out of town. Appt changed to 3/4/2020. Patient reports appt with endocrinologist on 2/12/2020 with A1C increasing to 7.5. Patient also reports 5lb weight gain. Admits she has not been exercising and was given instructions to ride her stationary bike 30min each day. Has also not been eating as she should nor checking blood sugars and documenting as she should. Has been checking blood sugar about once a day and was told to check three times a day. Getting back to more healthy diet also. Patient has not had any further prednisone. Patient does report two episodes of hypoglycemia with results in 40/50's. Patient states she had a tingling upper lip and took blood sugar. Patient drank some juice and rechecked blood sugar about 30 min later with values in 120's. Patient does admit low blood sugars likely due to skipping meals. Encouraged her to not skip a meal but to call with any further hypoglycemia. Patient does report being more stressed lately with life issues. Reports she plans to get more serious and get back to healthy habits. Will see endocrinologist in 2 months but will check in with us at diabetes med management in 2 weeks. Satish Sanches,Pharm. D,, BCPS, CACP  2/17/2020  10:01 AM

## 2020-03-04 ENCOUNTER — HOSPITAL ENCOUNTER (OUTPATIENT)
Dept: PHARMACY | Age: 65
Setting detail: THERAPIES SERIES
Discharge: HOME | End: 2020-03-04
Payer: COMMERCIAL

## 2020-03-04 VITALS — WEIGHT: 219 LBS | BODY MASS INDEX: 35.35 KG/M2

## 2020-03-04 PROCEDURE — 99212 OFFICE O/P EST SF 10 MIN: CPT

## 2020-03-04 NOTE — PROGRESS NOTES
Diabetic Medication Management   [address]    [address] [place], [address] [address] [facility]  Phone: [phone]  Fax: [phone]    NAME: [address] [facility] RECORD NUMBER:  [id number]  AGE: 62 y.o. GENDER: female  : 1955  EPISODE DATE:  3/4/2020       Ms. Jennifer Palma was referred to [facility] Medication Management Services by Dr. Ronny Delacruz, Special instructions include: follow up every 3 months with physician in office     Goals per referral:   Fasting blood glucose:   Peak postprandial glucose: < 180  A1C: < 7%    Other goals:  Blood pressure goal: 130/80    Weight loss goal (~10%): Target weight  200 to be reached by date: 2020 (3-6 months)  Physical Activity goal:    45 minutes 5 times per week to be reached by date: 2020 (3-6 months)  Smoking Cessation   Quit Date: never smoked  Cholesterol at target:No    Date: 2019  Annual eye exam:    Date: 2019  Comprehensive annual foot exam:   Date: 19    Annual urine Albumin and serum creatinine:   Date: 2019    Patient Specific Goals:  1. Lower blood sugars  2. Lower A1C    Subjective   Ms. Jennifer Palma is a 62 y.o. female here for the Diabetes Service for self-management education, medication review including over the counter medications and herbal products, overall wellbeing assessment, transition of care and any needed adjustments with updates and recommendations communicated to the referring physician. Patient's name and  verified. Patient Findings:    Patient saw Dr. Ronny Delacruz on 2020. Patient states Dr. Ronny Delacruz is concerned over increasing A1C. A1C was done in office on 2020 and reported at 7.5. Patient gradually started feeling better after last visit and did not get any more antibiotics, cough syrup or steroids. Patient concerned that Rexulti making her shake alittle but concerned if she complains they will decrease her dose and does not want to.  Will see her provider next week on 3/10/2020 and will discuss. Educated that there are medications that may be able to help her. Taking Cogentin but does not think it is working. Is looking to change to requip. Will meet with physician on 3/24 to discuss. Patient has been checking blood sugar three times a day most days. Discussed with endocrinologist as she was not checking as often but has been trying to do better since her appt on 2/12/2020. Has enough refills on all diabetic medications/supplies. Some minor vaginal discharge at times. Did discuss with PCP. Consideratly less than ever had with [place]. Patient had fish sandwich and carrotts for dinner last night. Nothing since last night. Patient did not check blood sugar this am prior to appt. Again encouraged patient to not skip meals. Checked blood sugar in office and found to be 273. Questioned patient more due to elevated blood sugar patient remembers she had a biscuit and about 5 crackers (saltines) and grape juice. Discussed fruit juice with patient and how this can affect blood sugar. Encouraged her to drink more water and low calorie / sugar free drinks. Patient has been exercising more using stationary bicycle at home. Doing 10 min of warm up, followed by 10 min more. Then does 10min of arms. Does 3 sets of arms and legs each day. Patient has been trying to do this every day but has been actually doing it 3-4 times a week. Patient feels diet has been much better. Eating protein size of deck of cards with 2 good servings of nonstartchy veges as much as possible. At night she will sometimes have some carbs - instant mashed potatoes with corn or peas. Discussed cottage cheese or yogurt. Not having issue gettign to store now. Patient continues on Lantus 35 units a day and metformin XR 1000 mg  In the morning and 1000 mg  XR in the evening and tolerating well. Also on Trulicity.       []  Missed doses   []  Emergency Room Visit    []  Medication changes  []  Hospitalization   [x]  Diet changes   []  Acute illness   []  Activity changes      Symptoms of hypoglycemia -  Two episodes of hypoglycemia in last two weeks. Both before lunch of 45 and 40 after skipping breakfast.  Discussed importance of not skipping any meals. Patient ate and felt better but still did not recheck blood sugar. Discussed importance of rechecking blood sugar to make sure it has gone up about 15 min later. Patient acknowledged and will do so in future. [x]  None   []  Shakiness    []  Lightheadedness or dizziness   []  Confusion      []  Sweating   []  Other       Symptoms of hyperglycemia   [x]  None   []  Frequent urination    []  Increased thirst   []  Other    Medication adverse reactions   [x]  None   []  Diarrhea / Nausea / Vomiting / Constipation / flatulence   []  Hypertension   []  Peripheral edema     []  Signs of infection   []  Headache   []  Vision changes   []  Increased cholesterol    []  Weight gain   []  Change in renal function   []  Increased potassium  []  Other      Comments:      If recent hospital admission / ED visit, was this related to Diabetes No: fall / evaluation by rescue squad but not to ED . Discharge date July 2019    Objective     PMHx:    Past Medical History:   Diagnosis Date    Anxiety     Asthma     Common cold     [facility]ronic obstructive [facility] disease) [facility] [address]     Depression     Diabetes mellitus (HCC)     Emphysema     Fatty liver     H/O: pneumonia FEB. 2014    Hiatal hernia     Hyperlipidemia     Hypertension     Mitral valve prolapse     Psychosis (HCC)     SOB (shortness of breath) on exertion     Spinal headache     Stroke ([facility] [address] 2013    Mild Lt side    Upper respiratory symptom        Social History:    Social History     Tobacco Use    Smoking status: Passive Smoke Exposure - Never Smoker    Smokeless tobacco: Never Used    Tobacco comment: socially smoked when playing cards   Substance Cholecalciferol (VITAMIN D3) 2000 units CAPS Take by mouth daily   Yes Historical Provider, MD   insulin glargine (LANTUS) 100 UNIT/ML injection vial Inject 35 Units into the skin nightly    Yes Historical Provider, MD   FLUARIX QUADRIVALENT 0.5 ML injection inject 0.5 milliliter intramuscularly 8/4/17  Yes Historical Provider, MD   losartan (COZAAR) 50 MG tablet Take 50 mg by mouth daily  10/25/17  Yes Historical Provider, MD   brexpiprazole (REXULTI) 4 MG TABS tablet Take 2 mg by mouth daily    Yes Historical Provider, MD   rosuvastatin (CRESTOR) 40 MG tablet Take 40 mg by mouth every evening   Yes Historical Provider, MD   montelukast (SINGULAIR) 10 MG tablet Take 10 mg by mouth nightly   Yes Historical Provider, MD   pantoprazole (PROTONIX) 40 MG tablet Take 1 tablet by mouth daily 7/20/16  Yes Jamin Cazares PA-C   loratadine (CLARITIN) 10 MG tablet Take 10 mg by mouth daily as needed (allergies)    Yes Historical Provider, MD   naproxen (NAPROSYN) 500 MG tablet Take 1 tablet by mouth 2 times daily (with meals)  Patient taking differently: Take 500 mg by mouth 2 times daily as needed for Pain Takes with meals 5/16/16  Yes Jamin Cazares PA-C   ONE TOUCH ULTRA TEST strip  9/23/15  Yes Historical Provider, MD   albuterol (PROVENTIL) (2.5 MG/3ML) 0.083% nebulizer solution Take 2.5 mg by nebulization every 6 hours as needed for Wheezing.    Yes Historical Provider, MD   rOPINIRole (REQUIP) 0.5 MG tablet Take 0.5 mg by mouth nightly    Historical Provider, MD       Immunizations:   Most Recent Immunizations   Administered Date(s) Administered    Influenza Virus Vaccine 11/20/2015    Pneumococcal Conjugate 13-valent (Saba Steens) 11/20/2015       Home Blood Glucose Results:     Blood glucose trends noted:         Fasting blood sugars since last visit 141,156,126,82,153,181,117.77,116,119,143,147,199,138        Other blood sugars:  Before Lunch:  45,148,185,179,40,139,76,159,99,136,241,305  Before Dinner: 220,026,546,366,951,923,931,856    Upon discussing diet with patient more she states that at night before bed she has sometimes instant mashed potatoes and corn or peas. Also has been eating biscuits or heavy carbs for other meals. Discussed healthier options as mentioned above. Discussed with patient that if she is able to control her diet better her blood sugars will likely come down. Also she needs to not skip meals to avoid hypoglycemia. Assessment     A1c at goal: No: 7.5 on 2/12/2020 by POC A1C in Dr. Hilaria Morales office. Blood Pressure at goal: Yes 124/80 on 2/12/2020  Weight at goal: No   Physical activity: Has been exercising 60 min 3-4 times a week. Would like to increase to most days of the week but encouraged patient that she is doing a good job. Jemma Hyatt Physical activity at goal: Yes  Smoking Cessation: Yes  Cholesterol at target: Yes: LDL 59  Annual eye exam completed: Yes  Comprehensive Foot Exam Completed: Yes  Annual urine albumin and serum creatinine: Yes    Statin: Yes    Appropriate?: Yes  Changes made:     ACE/ARB: Yes  Appropriate?: Yes  Changes made:     Aspirin: Yes  Appropriate?: Yes  Changes made:     Eating patterns:    []  My plate    []  Mediterranean diet   []  Low sodium   []  DASH diet   []  Portion control   []  Reduced calorie    []  Fast food / Restaurant  []  High glycemic index foods   []  Sugary beverages   [x]  Other     Comment: Patient will write down what she eating and bring it in to next appt to discuss and show her how different foods affect her blood sugar. Current Medications Affecting Diabetes:  Dulaglutide 1.5mg subQ once weekly  Insulin glargine 35 units subQ each evening  Metformin XR 1000mg in the morning and 1000 mg XR in the evening    Compliant: Yes  Barriers to medication therapy: None - continues to use alarm on phone    Medications attempted in the past:  Metformin about 3 years ago with some diarrhea. Unsure if regular or extended release.  Tolerating ER well. Recent exacerbations / new problems:  Yeast infections almost completely resolved. Last office dictation reviewed: yes        type 2 DM under worsening control as evidenced by A1C of 7.5 via POC on 2/12/2020     Plan     Physician Follow-up:  Every 3 months     Medication Management Follow-up:   Diabetes Service   Follow up in 2 weeks to see how improved diet affect blood sugar. Check blood sugars three times daily and document in sugar log. Continue:  Dulaglutide (Trulicity) 8.9IX subQ once weekly  Metformin XR 1000mg in the morning and 1000 mg XR in the evening  Insulin glargine (Lantus) 35 units subQ each evening    Continue current therapy as recent elevated blood sugars most likely due diet. If improved diet and continued exercise does not result in lowered blood sugar may consider increasing insulin.     Electronically signed by Charles Medellin RPH on 3/4/2020 at 8:42 PM

## 2020-03-16 ENCOUNTER — HOSPITAL ENCOUNTER (OUTPATIENT)
Dept: PHARMACY | Age: 65
Setting detail: THERAPIES SERIES
Discharge: HOME | End: 2020-03-16
Payer: COMMERCIAL

## 2020-03-16 PROCEDURE — 99211 OFF/OP EST MAY X REQ PHY/QHP: CPT

## 2020-03-17 NOTE — PROGRESS NOTES
eat and recheck blood sugar which is improvement from last visit. Patient did overdo it at least one of the times eating ice cream, frozen meal and 2 slices of bread or pizza and chicken nuggets. Discussed better choices for elevated blood sugars and appropriate quantity. Patient acknowledged and will do so in future. [x]  None   []  Shakiness    []  Lightheadedness or dizziness   []  Confusion      []  Sweating   []  Other       Symptoms of hyperglycemia   [x]  None   []  Frequent urination    []  Increased thirst   []  Other    Medication adverse reactions   [x]  None   []  Diarrhea / Nausea / Vomiting / Constipation / flatulence   []  Hypertension   []  Peripheral edema     []  Signs of infection   []  Headache   []  Vision changes   []  Increased cholesterol    []  Weight gain   []  Change in renal function   []  Increased potassium  []  Other      Comments:      If recent hospital admission / ED visit, was this related to Diabetes No: fall / evaluation by rescue squad but not to ED . Discharge date July 2019    Objective     PMHx:    Past Medical History:   Diagnosis Date    Anxiety     Asthma     Common cold     [facility] [address]     Depression     Diabetes mellitus (HCC)     Emphysema     Fatty liver     H/O: pneumonia FEB. 2014    Hiatal hernia     Hyperlipidemia     Hypertension     Mitral valve prolapse     Psychosis (HCC)     SOB (shortness of breath) on exertion     Spinal headache     Stroke ([facility] [address] 2013    Mild Lt side    Upper respiratory symptom        Social History:    Social History     Tobacco Use    Smoking status: Passive Smoke Exposure - Never Smoker    Smokeless tobacco: Never Used    Tobacco comment: socially smoked when playing cards   Substance Use Topics    Alcohol use: No     Alcohol/week: 0.0 standard drinks     Comment: socially       Pertinent Labs:    Lab Results   Component Value Date    LABA1C 7.5 02/12/2020    LABA1C 6.8 10/28/2019    LABA1C 7.2 (H) 07/22/2019     Lab Results   Component Value Date    CHOL 145 02/12/2020    TRIG 158 02/12/2020    HDL 54 02/12/2020    LDLCALC 59 02/12/2020     Lab Results   Component Value Date    CREATININE 1.11 02/12/2020    BUN 19 07/22/2019     07/22/2019    K 4.5 07/22/2019     07/22/2019    CO2 24 07/22/2019     Lab Results   Component Value Date    ALT 24 07/22/2019       Weight:  Wt Readings from Last 3 Encounters:   03/04/20 219 lb (99.3 kg)   01/15/20 216 lb 6.4 oz (98.2 kg)   12/03/19 219 lb 3.2 oz (99.4 kg)       Current medications:  Prior to Admission medications    Medication Sig Start Date End Date Taking?  Authorizing Provider   rOPINIRole (REQUIP) 0.5 MG tablet Take 0.5 mg by mouth nightly    Historical Provider, MD   albuterol sulfate  (90 Base) MCG/ACT inhaler Inhale 2 puffs into the lungs every 6 hours as needed for Wheezing    Historical Provider, MD   metFORMIN (GLUCOPHAGE XR) 500 MG extended release tablet Take 2 tablets by mouth 2 times daily (with meals) 9/4/19   Brent Terry   Melatonin 5 MG CAPS Take 1 capsule by mouth nightly as needed (sleep)    Historical Provider, MD   tiotropium (SPIRIVA RESPIMAT) 2.5 MCG/ACT AERS inhaler Inhale 2 puffs into the lungs daily    Historical Provider, MD   escitalopram (LEXAPRO) 20 MG tablet Take 20 mg by mouth daily     Historical Provider, MD   aspirin 81 MG chewable tablet Take 81 mg by mouth    Historical Provider, MD   metoprolol succinate (TOPROL XL) 50 MG extended release tablet Take 50 mg by mouth daily  1/12/18   Historical Provider, MD   Dulaglutide (TRULICITY) 1.5 HM/3.2RW SOPN Inject 1.5 mg into the skin once a week Takes on WEd 1/12/18   Historical Provider, MD   budesonide-formoterol (SYMBICORT) 160-4.5 MCG/ACT AERO 2 puffs twice a day 5/1/18   Historical Provider, MD   Cholecalciferol (VITAMIN D3) 2000 units CAPS Take by mouth daily    Historical Provider, MD   insulin glargine (LANTUS) 100 UNIT/ML injection vial Inject 35 Units into the skin nightly     Historical Provider, MD   FLUARIX QUADRIVALENT 0.5 ML injection inject 0.5 milliliter intramuscularly 8/4/17   Historical Provider, MD   losartan (COZAAR) 50 MG tablet Take 50 mg by mouth daily  10/25/17   Historical Provider, MD   brexpiprazole (REXULTI) 4 MG TABS tablet Take 2 mg by mouth daily     Historical Provider, MD   rosuvastatin (CRESTOR) 40 MG tablet Take 40 mg by mouth every evening    Historical Provider, MD   montelukast (SINGULAIR) 10 MG tablet Take 10 mg by mouth nightly    Historical Provider, MD   pantoprazole (PROTONIX) 40 MG tablet Take 1 tablet by mouth daily 7/20/16   [facility]   loratadine (CLARITIN) 10 MG tablet Take 10 mg by mouth daily as needed (allergies)     Historical Provider, MD   naproxen (NAPROSYN) 500 MG tablet Take 1 tablet by mouth 2 times daily (with meals)  Patient taking differently: Take 500 mg by mouth 2 times daily as needed for Pain Takes with meals 5/16/16   [facility]   ONE TOUCH ULTRA TEST strip  9/23/15   Historical Provider, MD   albuterol (PROVENTIL) (2.5 MG/3ML) 0.083% nebulizer solution Take 2.5 mg by nebulization every 6 hours as needed for Wheezing. Historical Provider, MD       Immunizations:   Most Recent Immunizations   Administered Date(s) Administered    Influenza Virus Vaccine 11/20/2015    Pneumococcal Conjugate 13-valent (Stephen Allisonicks) 11/20/2015       Home Blood Glucose Results:     Blood glucose trends noted:         Fasting blood sugars since last visit [id number]      Other blood sugars:  Before Lunch:  336,580,532,916,023,19,252,737,80,82  Before Dinner: 346,580,39,185,912,845,53,082,567,182  Bedtime: 136,136    Majority of time patient is running elevated with low blood sugars coming when patient skips meals or eats significantly less than normal.        Assessment     A1c at goal: No: 7.5 on 2/12/2020 by POC A1C in Dr. Mike Kerr office. Blood Pressure at goal: Yes 124/80 on 2/12/2020  Weight at goal: No   Physical activity: None currently due to feeling poorly. Physical activity at goal: No  Smoking Cessation: Yes  Cholesterol at target: Yes: LDL 59  Annual eye exam completed: Yes  Comprehensive Foot Exam Completed: Yes  Annual urine albumin and serum creatinine: Yes    Statin: Yes    Appropriate?: Yes  Changes made:     ACE/ARB: Yes  Appropriate?: Yes  Changes made:     Aspirin: Yes  Appropriate?: Yes  Changes made:     Eating patterns:    []  My plate    []  Mediterranean diet   []  Low sodium   []  DASH diet   []  Portion control   []  Reduced calorie    []  Fast food / Restaurant  []  High glycemic index foods   []  Sugary beverages   [x]  Other     Comment: Patient will write down what she eating and bring it in to next appt to continue to discuss and show her how different foods affect her blood sugar. Current Medications Affecting Diabetes:  Dulaglutide 1.5mg subQ once weekly  Insulin glargine 35 units subQ each evening  Metformin XR 1000mg in the morning and 1000 mg XR in the evening    Compliant: Yes  Barriers to medication therapy: None - continues to use alarm on phone    Medications attempted in the past:  Metformin about 3 years ago with some diarrhea. Unsure if regular or extended release. Tolerating ER well. Recent exacerbations / new problems:  Yeast infections almost completely resolved. Last office dictation reviewed: yes        type 2 DM under worsening control as evidenced by A1C of 7.5 via POC on 2/12/2020     Plan     Physician Follow-up:  Every 3 months     Medication Management Follow-up:   Diabetes Service   Increase insulin glargine (Lantus) to 38  Units subQ each evening. Patient to call with any blood sugar below 70. Check blood sugars three times daily and document in sugar log.      Continue:  Dulaglutide (Trulicity) 4.2SA subQ once weekly  Metformin XR 1000mg in the morning and 1000 mg XR in the evening  Insulin glargine (Lantus) 35 units subQ each evening    Continue current therapy as recent elevated blood sugars most likely due diet and low sugars due to skipping meals. .    Electronically signed by Eb Waters RPH on 3/16/2020 at 10:22 PM     CLINICAL PHARMACY CONSULT: MED RECONCILIATION/REVIEW Sanam  22. Tracking Only    PHSO: No  Total # of Interventions Recommended: 2  - New Order #: 1 New Medication Order Reason(s): Needs Additional Medication Therapy  - Updated Order #: 1 Updated Order Reason(s):  Other  Total Interventions Accepted: 2  Time Spent (min): 40    Satish Sanches, PharmD  [address]

## 2020-04-16 ENCOUNTER — HOSPITAL ENCOUNTER (OUTPATIENT)
Dept: PHARMACY | Age: 65
Setting detail: THERAPIES SERIES
Discharge: HOME | End: 2020-04-16
Payer: COMMERCIAL

## 2020-04-16 RX ORDER — GLIPIZIDE 10 MG/1
10 TABLET ORAL
Qty: 30 TABLET | Refills: 3 | OUTPATIENT
Start: 2020-04-16 | End: 2020-08-05 | Stop reason: ALTCHOICE

## 2020-04-16 NOTE — PROGRESS NOTES
Diabetic Medication Management   [address]    [address], [address] [facility] [address] [facility]  Phone: [phone]  Fax: [phone]    NAME: [facility] RECORD NUMBER:  [id number]  AGE: 62 y.o. GENDER: female  : 1955  EPISODE DATE:  2020       Ms. La Durham was referred to [facility] by Dr. Anastacia Garcia, Special instructions include: follow up every 3 months with physician in office     Goals per referral:   Fasting blood glucose:   Peak postprandial glucose: < 180  A1C: < 7%    Other goals:  Blood pressure goal: 130/80    Weight loss goal (~10%): Target weight  200 to be reached by date: 2020 (3-6 months)  Physical Activity goal:    45 minutes 5 times per week to be reached by date: 2020 (3-6 months)  Smoking Cessation   Quit Date: never smoked  Cholesterol at target:No    Date: 2019  Annual eye exam:    Date: 2019  Comprehensive annual foot exam:   Date: 19    Annual urine Albumin and serum creatinine:   Date: 2019    Patient Specific Goals:  1. Lower blood sugars  2. Lower A1C    Subjective   Ms. La Durham is a 62 y.o. female here for the Diabetes Service for self-management education, medication review including over the counter medications and herbal products, overall wellbeing assessment, transition of care and any needed adjustments with updates and recommendations communicated to the referring physician. Patient's name and  verified. Patient visit done over phone due to [address] restrictions and limiting potential exposure. Patient Findings:    Patient spoke with Dr. Anastacia Garcia yesterday over phone and reported her glucose levels and indicates he was happy with her control. Next appt scheduled for 2 months in . Upon patient report of medications currently in her bubble pack provided by Adriana Clark patient is currently taking glipizide 10mg twice a day.   Patient was to discontinue this in Oct 2019. Call to José Miguel Mejia finds patient has been taking regularly and never discontinued it. Patient has been exercising 40min/day most days starting about 2 weeks ago. Does 10min legs/10 min arms/10 legs/10 min arms on an exercise bike with arms. Patient feeling well. Chest congestion has cleared up and no longer has sputum production significantly in the morning. No longer taking mucinex. Still has some sinus issues. Patient stopped both requip and congentin. She states her legs were hurting horribly and was not helped when started requip. Since stopping both of the medications her legs have felt better. Patient stopped both about a week ago. Has not noticed any shaking movements. Encouraged her call and discuss with her physician and explained that she should never stop any medication without discussing or informing her physician. Has been trying to eat better and avoid unhealthy / surgery foods. Went to store and got things such as oatmeal, turkey breast, peanut butter, hamburger, bread (honey wheat - doesn't raise blood sugar per patient), veges (lettuce, tomatoes, onions), beets with cottage cheese. Out of yogurt right now but plans to go to store to get more. Avoiding carb foods at bedtime/evening. Patient has been checking blood sugar three times a day most days but still has days she forgets. Has enough refills on all diabetic medications/supplies. []  Missed doses   []  Emergency Room Visit    [x]  Medication changes  []  Hospitalization   [x]  Diet changes   []  Acute illness   []  Activity changes      Symptoms of hypoglycemia -  Patient still reports episodes of hypoglycemia or near hypoglycemia. When reviewing blood sugar levels with patient it appears this happens when she skips breakfast. Although we have discussed the need to not skip breakfast she is doing better but still misses it periodically.   Patient is taking glipizide at 8am regardless if she eats or not which is likely causing her hypoglycemia. As this likely contributing to hypoglycemia will discontinue AM dose of glipizide. Will work to discontinue glipizide completely and increase Lantus as needed over next few appts. [x]  None   []  Shakiness    []  Lightheadedness or dizziness   []  Confusion      []  Sweating   []  Other       Symptoms of hyperglycemia   [x]  None   []  Frequent urination    []  Increased thirst   []  Other    Medication adverse reactions   [x]  None   []  Diarrhea / Nausea / Vomiting / Constipation / flatulence   []  Hypertension   []  Peripheral edema     []  Signs of infection   []  Headache   []  Vision changes   []  Increased cholesterol    []  Weight gain   []  Change in renal function   []  Increased potassium  []  Other      Comments:      If recent hospital admission / ED visit, was this related to Diabetes No: fall / evaluation by rescue squad but not to ED . Discharge date July 2019    Objective     PMHx:    Past Medical History:   Diagnosis Date    Anxiety     Asthma     Common cold     [facility] [address]     Depression     Diabetes mellitus (HCC)     Emphysema     Fatty liver     H/O: pneumonia FEB. 2014    Hiatal hernia     Hyperlipidemia     Hypertension     Mitral valve prolapse     Psychosis (HCC)     SOB (shortness of breath) on exertion     Spinal headache     Stroke ([facility] [address] 2013    Mild Lt side    Upper respiratory symptom        Social History:    Social History     Tobacco Use    Smoking status: Passive Smoke Exposure - Never Smoker    Smokeless tobacco: Never Used    Tobacco comment: socially smoked when playing cards   Substance Use Topics    Alcohol use: No     Alcohol/week: 0.0 standard drinks     Comment: socially       Pertinent Labs:    Lab Results   Component Value Date    LABA1C 7.5 02/12/2020    LABA1C 6.8 10/28/2019    LABA1C 7.2 (H) 07/22/2019     Lab Results Component Value Date    CHOL 145 02/12/2020    TRIG 158 02/12/2020    HDL 54 02/12/2020    LDLCALC 59 02/12/2020     Lab Results   Component Value Date    CREATININE 1.11 02/12/2020    BUN 19 07/22/2019     07/22/2019    K 4.5 07/22/2019     07/22/2019    CO2 24 07/22/2019     Lab Results   Component Value Date    ALT 24 07/22/2019       Weight:  Wt Readings from Last 3 Encounters:   03/04/20 219 lb (99.3 kg)   01/15/20 216 lb 6.4 oz (98.2 kg)   12/03/19 219 lb 3.2 oz (99.4 kg)       Current medications:  Prior to Admission medications    Medication Sig Start Date End Date Taking?  Authorizing Provider   glipiZIDE (GLUCOTROL) 10 MG tablet Take 1 tablet by mouth Daily with supper 4/16/20  Yes Ernestine Santana   rOPINIRole (REQUIP) 0.5 MG tablet Take 0.5 mg by mouth nightly    Historical Provider, MD   albuterol sulfate  (90 Base) MCG/ACT inhaler Inhale 2 puffs into the lungs every 6 hours as needed for Wheezing    Historical Provider, MD   metFORMIN (GLUCOPHAGE XR) 500 MG extended release tablet Take 2 tablets by mouth 2 times daily (with meals) 9/4/19   Yosef Graham   Melatonin 5 MG CAPS Take 1 capsule by mouth nightly as needed (sleep)    Historical Provider, MD   tiotropium (SPIRIVA RESPIMAT) 2.5 MCG/ACT AERS inhaler Inhale 2 puffs into the lungs daily    Historical Provider, MD   escitalopram (LEXAPRO) 20 MG tablet Take 20 mg by mouth daily     Historical Provider, MD   aspirin 81 MG chewable tablet Take 81 mg by mouth    Historical Provider, MD   metoprolol succinate (TOPROL XL) 50 MG extended release tablet Take 50 mg by mouth daily  1/12/18   Historical Provider, MD   Dulaglutide (TRULICITY) 1.5 JS/5.0QR SOPN Inject 1.5 mg into the skin once a week Takes on WEd 1/12/18   Historical Provider, MD   budesonide-formoterol (SYMBICORT) 160-4.5 MCG/ACT AERO 2 puffs twice a day 5/1/18   Historical Provider, MD   Cholecalciferol (VITAMIN D3) 2000 units CAPS Take by mouth

## 2020-05-13 ENCOUNTER — HOSPITAL ENCOUNTER (OUTPATIENT)
Dept: PHARMACY | Age: 65
Setting detail: THERAPIES SERIES
Discharge: HOME | End: 2020-05-13
Payer: COMMERCIAL

## 2020-05-13 PROCEDURE — 99212 OFFICE O/P EST SF 10 MIN: CPT

## 2020-05-13 NOTE — PROGRESS NOTES
Diabetic Medication Management   [facility]    [address]. [place], [address] [facility]  Phone: [phone]  Fax: [phone]    NAME: [facility] RECORD NUMBER:  [id number]  AGE: 62 y.o. GENDER: female  : 1955  EPISODE DATE:  2020       Ms. Claudia Navarro was referred to [facility] by Dr. Chaya Saba, Special instructions include: follow up every 3 months with physician in office     Goals per referral:   Fasting blood glucose:   Peak postprandial glucose: < 180  A1C: < 7%    Other goals:  Blood pressure goal: 130/80    Weight loss goal (~10%): Target weight  200 to be reached by date: 2020 (3-6 months)  Physical Activity goal:    45 minutes 5 times per week to be reached by date: 2020 (3-6 months)  Smoking Cessation   Quit Date: never smoked  Cholesterol at target:No    Date: 2019  Annual eye exam:    Date: 2019  Comprehensive annual foot exam:   Date: 19    Annual urine Albumin and serum creatinine:   Date: 2019    Patient Specific Goals:  1. Lower blood sugars  2. Lower A1C    Subjective   Ms. Claudia Navarro is a 62 y.o. female here for the Diabetes Service for self-management education, medication review including over the counter medications and herbal products, overall wellbeing assessment, transition of care and any needed adjustments with updates and recommendations communicated to the referring physician. Patient's name and  verified. Patient visit done over phone due to [address] restrictions and limiting potential exposure. Patient Findings:    Patient has been more tired lately and having trouble waking up to do everything. No specific symptoms just likely due to not being able to go out and do much. Taking Glipizide at 3-4pm which can be before lunch or after.   Educated patient once again importance of taking with meal.  Discussed less important to take at specific time but to take when starts to eat. Patient will take with lunch as this is typically her largest meal.    Has not been exercising at all as has been tired. Discussed that not doing any exercise can actually make you more tired. Patient will try to get back to regular exercise. Patient reports growth on uvula. Patient reports pain in mouth from cold sores but does not think her uvula hurts. Has been there for about 2 weeks and not changing. Encouraged her to call her physician. Scale at home does not work so patient has not been able to weigh herself. Patient stopped both requip and congentin. Spoke to physician and was told she can stop. Continues to eat healthier. Eating turkey, lean meats    Has enough refills on all diabetic medications/supplies. []  Missed doses   []  Emergency Room Visit    []  Medication changes  []  Hospitalization   []  Diet changes   []  Acute illness   [x]  Activity changes      Symptoms of hypoglycemia -  Patient still reports episodes of hypoglycemia or near hypoglycemia. Patient is taking glipizide at 3-4pm regardless if she eats or not which is likely causing her hypoglycemia. Stressed importance of taking glipizide when beginning to eat. [x]  None   []  Shakiness    []  Lightheadedness or dizziness   []  Confusion      []  Sweating   []  Other       Symptoms of hyperglycemia   [x]  None   []  Frequent urination    []  Increased thirst   []  Other    Medication adverse reactions   [x]  None   []  Diarrhea / Nausea / Vomiting / Constipation / flatulence   []  Hypertension   []  Peripheral edema     []  Signs of infection   []  Headache   []  Vision changes   []  Increased cholesterol    []  Weight gain   []  Change in renal function   []  Increased potassium  []  Other      Comments:      If recent hospital admission / ED visit, was this related to Diabetes No: fall / evaluation by rescue squad but not to ED .  Discharge date July 2019    Objective     PMHx:    Past Medical History:   Diagnosis Date    Anxiety     Asthma     Common cold     COPD (chronic obstructive pulmonary disease) (HCC)     Depression     Diabetes mellitus (HCC)     Emphysema     Fatty liver     H/O: pneumonia FEB. 2014    Hiatal hernia     Hyperlipidemia     Hypertension     Mitral valve prolapse     Psychosis (HCC)     SOB (shortness of breath) on exertion     Spinal headache     Stroke ([facility] [address] 2013    Mild Lt side    Upper respiratory symptom        Social History:    Social History     Tobacco Use    Smoking status: Passive Smoke Exposure - Never Smoker    Smokeless tobacco: Never Used    Tobacco comment: socially smoked when playing cards   Substance Use Topics    Alcohol use: No     Alcohol/week: 0.0 standard drinks     Comment: socially       Pertinent Labs:    Lab Results   Component Value Date    LABA1C 7.5 02/12/2020    LABA1C 6.8 10/28/2019    LABA1C 7.2 (H) 07/22/2019     Lab Results   Component Value Date    CHOL 145 02/12/2020    TRIG 158 02/12/2020    HDL 54 02/12/2020    LDLCALC 59 02/12/2020     Lab Results   Component Value Date    CREATININE 1.11 02/12/2020    BUN 19 07/22/2019     07/22/2019    K 4.5 07/22/2019     07/22/2019    CO2 24 07/22/2019     Lab Results   Component Value Date    ALT 24 07/22/2019       Weight:  Wt Readings from Last 3 Encounters:   03/04/20 219 lb (99.3 kg)   01/15/20 216 lb 6.4 oz (98.2 kg)   12/03/19 219 lb 3.2 oz (99.4 kg)       Current medications:  Prior to Admission medications    Medication Sig Start Date End Date Taking?  Authorizing Provider   glipiZIDE (GLUCOTROL) 10 MG tablet Take 1 tablet by mouth Daily with supper 4/16/20   Chauncey Santana   rOPINIRole (REQUIP) 0.5 MG tablet Take 0.5 mg by mouth nightly    Historical Provider, MD   albuterol sulfate  (90 Base) MCG/ACT inhaler Inhale 2 puffs into the lungs every 6 hours as needed for Wheezing    Historical Provider, MD   metFORMIN (GLUCOPHAGE XR) 500 MG extended release tablet Take 2 tablets by mouth 2 times daily (with meals) 9/4/19   Vicente Murphy   Melatonin 5 MG CAPS Take 1 capsule by mouth nightly as needed (sleep)    Historical Provider, MD   tiotropium (SPIRIVA RESPIMAT) 2.5 MCG/ACT AERS inhaler Inhale 2 puffs into the lungs daily    Historical Provider, MD   escitalopram (LEXAPRO) 20 MG tablet Take 20 mg by mouth daily     Historical Provider, MD   aspirin 81 MG chewable tablet Take 81 mg by mouth    Historical Provider, MD   metoprolol succinate (TOPROL XL) 50 MG extended release tablet Take 50 mg by mouth daily  1/12/18   Historical Provider, MD   Dulaglutide (TRULICITY) 1.5 YT/4.9DW SOPN Inject 1.5 mg into the skin once a week Takes on WEd 1/12/18   Historical Provider, MD   budesonide-formoterol (SYMBICORT) 160-4.5 MCG/ACT AERO 2 puffs twice a day 5/1/18   Historical Provider, MD   Cholecalciferol (VITAMIN D3) 2000 units CAPS Take by mouth daily    Historical Provider, MD   insulin glargine (LANTUS) 100 UNIT/ML injection vial Inject 40 Units into the skin nightly     Historical Provider, MD   FLUARIX QUADRIVALENT 0.5 ML injection inject 0.5 milliliter intramuscularly 8/4/17   Historical Provider, MD   losartan (COZAAR) 50 MG tablet Take 50 mg by mouth daily  10/25/17   Historical Provider, MD   brexpiprazole (REXULTI) 4 MG TABS tablet Take 2 mg by mouth daily     Historical Provider, MD   rosuvastatin (CRESTOR) 40 MG tablet Take 40 mg by mouth every evening    Historical Provider, MD   montelukast (SINGULAIR) 10 MG tablet Take 10 mg by mouth nightly    Historical Provider, MD   pantoprazole (PROTONIX) 40 MG tablet Take 1 tablet by mouth daily 7/20/16   Anjali Conde PA-C   loratadine (CLARITIN) 10 MG tablet Take 10 mg by mouth daily as needed (allergies)     Historical Provider, MD   naproxen (NAPROSYN) 500 MG tablet Take 1 tablet by mouth 2 times daily (with meals)  Patient taking differently: Take 500 mg by mouth 2 times daily as needed for Pain Takes with meals 5/16/16   Vin Kee PA-C   ONE TOUCH ULTRA TEST strip  9/23/15   Historical Provider, MD   albuterol (PROVENTIL) (2.5 MG/3ML) 0.083% nebulizer solution Take 2.5 mg by nebulization every 6 hours as needed for Wheezing. Historical Provider, MD       Immunizations:   Most Recent Immunizations   Administered Date(s) Administered    Influenza Virus Vaccine 11/20/2015    Pneumococcal Conjugate 13-valent ([facility]) 11/20/2015       Home Blood Glucose Results:     Blood glucose trends noted:         Fasting blood sugars since last visit 103,90,158,112,63 (skipped breakfast),169 (over ate evening before),121, 99     Other blood sugars:  Before Lunch: 93  Before Dinner: 120,106, 60  After Dinner: 114,868,230 (unsure what ate),201 (unsure), 118,162    Overall blood sugars are mostly within range for fasting but her post prandial dinner sugars remain elevated. Will continue glipizide for now in effort to help lower these values but encouraged patient to monitor her diet and document what she ate when blood sugar elevated. Assessment     A1c at goal: No: 7.5 on 2/12/2020 by POC A1C in Dr. Efrain Zepeda office. Blood Pressure at goal: Yes 124/80 on 2/12/2020  Weight at goal: No   Physical activity: 40 minutes 5 days a week or more  Physical activity at goal: yes  Smoking Cessation: Yes  Cholesterol at target: Yes: LDL 59  Annual eye exam completed: Yes  Comprehensive Foot Exam Completed:  Yes  Annual urine albumin and serum creatinine: Yes    Statin: Yes    Appropriate?: Yes  Changes made:     ACE/ARB: Yes  Appropriate?: Yes  Changes made:     Aspirin: Yes  Appropriate?: Yes  Changes made:     Eating patterns:    []  My plate    []  Mediterranean diet   []  Low sodium   []  DASH diet   []  Portion control   []  Reduced calorie    []  Fast food / Restaurant  []  High glycemic index foods   []  Sugary beverages   [x]  Other     Comment: see above    Current Medications Affecting Diabetes:  Dulaglutide 1.5mg subQ once weekly on Wednesdays  Insulin glargine 40 units subQ each evening  Metformin XR 1000mg in the morning and 1000 mg XR in the evening  Glipizide 10mg once a day. Compliant: Yes  Barriers to medication therapy: None - continues to use alarm on phone    Medications attempted in the past:  Metformin about 3 years ago with some diarrhea. Unsure if regular or extended release. Tolerating ER well. Recent exacerbations / new problems:  Yeast infections almost completely resolved. Last office dictation reviewed: yes        type 2 DM under worsening control as evidenced by A1C of 7.5 via POC on 2/12/2020     Plan     Physician Follow-up:  Every 3 months     Medication Management Follow-up:   Diabetes Service   Continue:  -Insulin glargine (Lantus) to 40  Units subQ each evening. Patient to call with any blood sugar below 70 or above 250.  -Dulaglutide (Trulicity) 2.5CQ subQ once weekly  -Metformin XR 1000mg in the morning and 1000 mg XR in the evening  -Glipizide 10mg once a day with lunch. Check blood sugars three times daily and document in sugar log. Dong down if blood sugars before meal or postprandial.  Also, if blood sugar is below 70 or above 200 try to write why. Patient to resume exercise. Take glipizide with lunch. Dr. Kishan Kaur June 15th in office. Electronically signed by John Marlow RPH on 5/13/2020 at 10:09 AM     CLINICAL PHARMACY CONSULT: MED RECONCILIATION/REVIEW ADDENDUM    For Pharmacy Admin Tracking Only    PHSO: No  Total # of Interventions Recommended: 1  - Decreased Dose #: 1  - New Order #: 1 New Medication Order Reason(s):  Adherence  Total Interventions Accepted: 1  Time Spent (min): 40    Satish Sanches, PharmD  [address]

## 2020-06-23 ENCOUNTER — TELEPHONE (OUTPATIENT)
Dept: PHARMACY | Age: 65
End: 2020-06-23

## 2020-06-23 NOTE — TELEPHONE ENCOUNTER
Spoke with patient for COVID 19 screening secondary to upcoming appointment tomorrow . At this time patient denies symptoms, recent travel and exposure. Patient will report to [facility] for INR check at scheduled time. Patient educated to call physician or go to ER with respiratory symptoms or fever and to not present to appointment if symptomatic. Will coordinate for next INR check accordingly.      Bonita Armando, PharmD, [facility]  6/23/2020 4:24 PM

## 2020-06-24 ENCOUNTER — HOSPITAL ENCOUNTER (OUTPATIENT)
Dept: PHARMACY | Age: 65
Setting detail: THERAPIES SERIES
Discharge: HOME | End: 2020-06-24
Payer: COMMERCIAL

## 2020-06-24 PROCEDURE — 99212 OFFICE O/P EST SF 10 MIN: CPT

## 2020-06-24 NOTE — PROGRESS NOTES
but did have one episode. Stressed appropriate management of low blood sugar (eat then recheck to document normal blood sugar). []  None   [x]  Shakiness    []  Lightheadedness or dizziness   []  Confusion      []  Sweating   []  Other       Symptoms of hyperglycemia   [x]  None   []  Frequent urination    []  Increased thirst   []  Other    Medication adverse reactions   [x]  None   []  Diarrhea / Nausea / Vomiting / Constipation / flatulence   []  Hypertension   []  Peripheral edema     []  Signs of infection   []  Headache   []  Vision changes   []  Increased cholesterol    []  Weight gain   []  Change in renal function   []  Increased potassium  []  Other      Comments:      If recent hospital admission / ED visit, was this related to Diabetes No: fall / evaluation by rescue squad but not to ED . Discharge date July 2019    Objective     PMHx:    Past Medical History:   Diagnosis Date    Anxiety     Asthma     Common cold     [facility]) ([facility] [address]     Depression     Diabetes mellitus (HCC)     Emphysema     Fatty liver     H/O: pneumonia FEB. 2014    Hiatal hernia     Hyperlipidemia     Hypertension     Mitral valve prolapse     Psychosis (HCC)     SOB (shortness of breath) on exertion     Spinal headache     Stroke ([facility] [address] 2013    Mild Lt side    Upper respiratory symptom        Social History:    Social History     Tobacco Use    Smoking status: Passive Smoke Exposure - Never Smoker    Smokeless tobacco: Never Used    Tobacco comment: socially smoked when playing cards   Substance Use Topics    Alcohol use: No     Alcohol/week: 0.0 standard drinks     Comment: socially       Pertinent Labs:    Lab Results   Component Value Date    LABA1C 7.5 02/12/2020    LABA1C 6.8 10/28/2019    LABA1C 7.2 (H) 07/22/2019     Lab Results   Component Value Date    CHOL 145 02/12/2020    TRIG 158 02/12/2020    HDL 54 02/12/2020    LDLCALC 59 02/12/2020     Lab Results Historical Provider, MD   FLUARIX QUADRIVALENT 0.5 ML injection inject 0.5 milliliter intramuscularly 8/4/17   Historical Provider, MD   losartan (COZAAR) 50 MG tablet Take 50 mg by mouth daily  10/25/17   Historical Provider, MD   brexpiprazole (REXULTI) 4 MG TABS tablet Take 2 mg by mouth daily     Historical Provider, MD   rosuvastatin (CRESTOR) 40 MG tablet Take 40 mg by mouth every evening    Historical Provider, MD   montelukast (SINGULAIR) 10 MG tablet Take 10 mg by mouth nightly    Historical Provider, MD   pantoprazole (PROTONIX) 40 MG tablet Take 1 tablet by mouth daily 7/20/16   Elizabeth Carlos PA-C   loratadine (CLARITIN) 10 MG tablet Take 10 mg by mouth daily as needed (allergies)     Historical Provider, MD   naproxen (NAPROSYN) 500 MG tablet Take 1 tablet by mouth 2 times daily (with meals)  Patient taking differently: Take 500 mg by mouth 2 times daily as needed for Pain Takes with meals 5/16/16   Elizabeth Carlos PA-C   ONE TOUCH ULTRA TEST strip  9/23/15   Historical Provider, MD   albuterol (PROVENTIL) (2.5 MG/3ML) 0.083% nebulizer solution Take 2.5 mg by nebulization every 6 hours as needed for Wheezing.     Historical Provider, MD       Immunizations:   Most Recent Immunizations   Administered Date(s) Administered    Influenza Virus Vaccine 11/20/2015    Pneumococcal Conjugate 13-valent ([facility]) 11/20/2015       Home Blood Glucose Results:     Blood glucose trends noted:        6/11 104 fasting, 126 before lunch  6/12  70 fasting, 128 before dinner  6/13  104 fasting, 191 after lunch  6/14  104 fasting, 104 before lunch, 82 before dinner  6/15  149 fasting 160 before dinner  6/16  221 before dinner slept all day  6/17  91 fasting, 57 before dinner  6/18 246 (fasting - skipped insulin evening before as had hypoglycemia of 57. 85 before lunch, 186 after dinner  6/19  139 (fasting), 147 before lunch, 104 before dinner  6/20  171 (fasting but ate bagel at 3am), 122 before lunch, 163 (after

## 2020-06-29 LAB — HBA1C MFR BLD: 7.5 %

## 2020-07-08 ENCOUNTER — TELEPHONE (OUTPATIENT)
Dept: PHARMACY | Age: 65
End: 2020-07-08

## 2020-07-08 NOTE — TELEPHONE ENCOUNTER
Patient called to ask us to send her a glucose log as hers is full. Upon questioning patient she reports having a glucose of 57 a day or two ago. States she ate some chocolate, an icecream bar, a sandwich and some crackers then fell asleep about 4 hours later without rechecking her glucose. Discussed appropriate management of hypoglycemia and importance of rechecking blood sugar. Patient notes she has had a few other lower readings but not clear on when or if she skipped meals. Will have patient come in to see her glucose log and have better communication. Appt scheduled for next week. Satish Sanches,Pharm. D,, BCPS, CACP  7/8/2020  3:21 PM

## 2020-07-10 ENCOUNTER — TELEPHONE (OUTPATIENT)
Dept: PHARMACY | Age: 65
End: 2020-07-10

## 2020-07-13 ENCOUNTER — HOSPITAL ENCOUNTER (OUTPATIENT)
Dept: PHARMACY | Age: 65
Setting detail: THERAPIES SERIES
Discharge: HOME | End: 2020-07-13
Payer: COMMERCIAL

## 2020-07-13 VITALS — BODY MASS INDEX: 35.41 KG/M2 | WEIGHT: 219.4 LBS | TEMPERATURE: 97.5 F

## 2020-07-13 PROCEDURE — 99212 OFFICE O/P EST SF 10 MIN: CPT

## 2020-07-13 NOTE — PROGRESS NOTES
states she is having family over for birthday celebration and mentions she is planning on getting some potato salad or macaroni salad. Discussed with her and she thinks a leaf salad may be healthier. Supported this decision and asked patient to think through what she is going to eat prior to eat to encourage healthier choices. Reports missing her insulin a couple times due to falling asleep. Again discussed importance of taking insulin and asked her to set an alarm or if she does not think she will be getting up again to take it before going to sleep. Patient also reports she sometimes skips her glipizide if she gets off schedule and does not eat lunch. When asked for how often this happens she states it is about once every other week. Stressed need to not skip meals. Patient has had three episodes of hypoglycemia in last month. Every episode appears to happen after she skips a meal. Discussed importance of eating regular meals and reviewed glucose log so patient could see relationship to low blood sugars. After each low blood sugar her next blood sugar is elevated. When discussed what patient usually eats after having hypoglycemia it is usually too much. Patient was not rechecking blood sugar after eating until we discussed over telephone last week. Her last hypoglycemic episode which was last night, she ate 5 bite size candy bars and rechecked in 20 min and blood sugar had increased to 99. Thanked patient for following instructions. Again reviewed hypoglycemia management and gave patient an educational poster to post at home. Did discuss growth on uvula with PCP who states they will watch for any change in color or size. Next visit with PCP in Sept.  Will also see endocrinology in Sept. (9/23)    Has not been being regular with exercise but has been walking to the store/[employer] a few times a week. States it takes her 30-45 min as she is slow.   Not using her stationary bike much as cleaned the room it is in and had moved it out. Moved it back in now so hoping to use more. Had POC A1c done on 6/29/20 at PCP office reported at 7.5 wish is stable from Feb 2020    Patient encouraged to call her eye doctor to make an appt as due for visit. Has enough refills on all diabetic medications/supplies. []  Missed doses   []  Emergency Room Visit    []  Medication changes  []  Hospitalization   [x]  Diet changes   []  Acute illness   [x]  Activity changes      Symptoms of hypoglycemia -     []  None   [x]  Shakiness    []  Lightheadedness or dizziness   []  Confusion      []  Sweating   []  Other       Symptoms of hyperglycemia   [x]  None   []  Frequent urination    []  Increased thirst   []  Other    Medication adverse reactions   [x]  None   []  Diarrhea / Nausea / Vomiting / Constipation / flatulence   []  Hypertension   []  Peripheral edema     []  Signs of infection   []  Headache   []  Vision changes   []  Increased cholesterol    []  Weight gain   []  Change in renal function   []  Increased potassium  []  Other      Comments:      If recent hospital admission / ED visit, was this related to Diabetes No: fall / evaluation by rescue squad but not to ED . Discharge date July 2019    Objective     PMHx:    Past Medical History:   Diagnosis Date    Anxiety     Asthma     Common cold     [facility] [address]     Depression     Diabetes mellitus (HCC)     Emphysema     Fatty liver     H/O: pneumonia FEB. 2014    Hiatal hernia     Hyperlipidemia     Hypertension     Mitral valve prolapse     Psychosis (HCC)     SOB (shortness of breath) on exertion     Spinal headache     Stroke ([facility] [address] 2013    Mild Lt side    Upper respiratory symptom        Social History:    Social History     Tobacco Use    Smoking status: Passive Smoke Exposure - Never Smoker    Smokeless tobacco: Never Used    Tobacco comment: socially smoked when playing cards   Substance Use Topics    Alcohol use: No     Alcohol/week: 0.0 standard drinks     Comment: socially       Pertinent Labs:    Lab Results   Component Value Date    LABA1C 7.5 06/29/2020    LABA1C 7.5 02/12/2020    LABA1C 6.8 10/28/2019     Lab Results   Component Value Date    CHOL 145 02/12/2020    TRIG 158 02/12/2020    HDL 54 02/12/2020    LDLCALC 59 02/12/2020     Lab Results   Component Value Date    CREATININE 1.11 02/12/2020    BUN 19 07/22/2019     07/22/2019    K 4.5 07/22/2019     07/22/2019    CO2 24 07/22/2019     Lab Results   Component Value Date    ALT 24 07/22/2019       Weight:  Wt Readings from Last 3 Encounters:   07/13/20 219 lb 6.4 oz (99.5 kg)   03/04/20 219 lb (99.3 kg)   01/15/20 216 lb 6.4 oz (98.2 kg)       Current medications:  Prior to Admission medications    Medication Sig Start Date End Date Taking?  Authorizing Provider   glipiZIDE (GLUCOTROL) 10 MG tablet Take 1 tablet by mouth Daily with supper  Patient taking differently: Take 10 mg by mouth Daily with lunch  4/16/20  Yes Daryle Grinder Gonzales   albuterol sulfate  (90 Base) MCG/ACT inhaler Inhale 2 puffs into the lungs every 6 hours as needed for Wheezing   Yes Historical Provider, MD   metFORMIN (GLUCOPHAGE XR) 500 MG extended release tablet Take 2 tablets by mouth 2 times daily (with meals) 9/4/19  Yes Quique Vasques   Melatonin 5 MG CAPS Take 1 capsule by mouth nightly as needed (sleep)   Yes Historical Provider, MD   tiotropium (SPIRIVA RESPIMAT) 2.5 MCG/ACT AERS inhaler Inhale 2 puffs into the lungs daily   Yes Historical Provider, MD   escitalopram (LEXAPRO) 20 MG tablet Take 20 mg by mouth daily    Yes Historical Provider, MD   aspirin 81 MG chewable tablet Take 81 mg by mouth   Yes Historical Provider, MD   metoprolol succinate (TOPROL XL) 50 MG extended release tablet Take 50 mg by mouth daily  1/12/18  Yes Historical Provider, MD   Dulaglutide (TRULICITY) 1.5 KF/8.9MP SOPN Inject 1.5 mg into the skin once a week Takes on WEd 1/12/18  Yes Historical Provider, MD   budesonide-formoterol (SYMBICORT) 160-4.5 MCG/ACT AERO 2 puffs twice a day 5/1/18  Yes Historical Provider, MD   Cholecalciferol (VITAMIN D3) 2000 units CAPS Take by mouth daily   Yes Historical Provider, MD   insulin glargine (LANTUS) 100 UNIT/ML injection vial Inject 40 Units into the skin nightly    Yes Historical Provider, MD   FLUARIX QUADRIVALENT 0.5 ML injection inject 0.5 milliliter intramuscularly 8/4/17  Yes Historical Provider, MD   losartan (COZAAR) 50 MG tablet Take 50 mg by mouth daily  10/25/17  Yes Historical Provider, MD   brexpiprazole (REXULTI) 4 MG TABS tablet Take 2 mg by mouth daily    Yes Historical Provider, MD   rosuvastatin (CRESTOR) 40 MG tablet Take 40 mg by mouth every evening   Yes Historical Provider, MD   montelukast (SINGULAIR) 10 MG tablet Take 10 mg by mouth nightly   Yes Historical Provider, MD   pantoprazole (PROTONIX) 40 MG tablet Take 1 tablet by mouth daily 7/20/16  Yes Deo Stearns PA-C   loratadine (CLARITIN) 10 MG tablet Take 10 mg by mouth daily as needed (allergies)    Yes Historical Provider, MD   naproxen (NAPROSYN) 500 MG tablet Take 1 tablet by mouth 2 times daily (with meals)  Patient taking differently: Take 500 mg by mouth 2 times daily as needed for Pain Takes with meals 5/16/16  Yes Deo Stearns PA-C   ONE TOUCH ULTRA TEST strip  9/23/15  Yes Historical Provider, MD   albuterol (PROVENTIL) (2.5 MG/3ML) 0.083% nebulizer solution Take 2.5 mg by nebulization every 6 hours as needed for Wheezing.    Yes Historical Provider, MD   rOPINIRole (REQUIP) 0.5 MG tablet Take 0.5 mg by mouth nightly  7/13/20  Historical Provider, MD       Immunizations:   Most Recent Immunizations   Administered Date(s) Administered    Influenza Virus Vaccine 11/20/2015    Pneumococcal Conjugate 13-valent (Prema Juarez) 11/20/2015       Home Blood Glucose Results:     Blood glucose trends noted:      6/29 109 fasting (11:09), 85 before dinner (17:12)  6/30 91 before dinner (18:41)  7/1  105 fasting (6:11), 64 before dinner (20:28)  7/2  219 fasting (12:40) over ate gummy bears night before  7/3 92 before lunch (14:32), 146 before dinner (20:27)  7/4  135 before lunch (13:36)  7/5  325 fasting (7:10) skipped insulin night before/skipped breakfast,79 before lunch (15:37), 141 before dinner (17:14)  7/6  143 fasting (10:35), 167 before dinner (23:52)  7/7  90 fasting (13:42), 57 before dinner (17:48)  7/8  153 fasting (8:40), 212 before lunch (14:30), 142 before dinner (17:52)  7/9  144 fasting (12:43), 162 before lunch (17:19), 149 before dinner (20:32)  7/10 216 fasting (7:49), 171 before lunch (11:14), 97 before dinner (15:25)  7/11 89 fasting (16:40), 115 before dinner (17:25)  7/12 142 fasting (9:59), 148 before dinner (20:03), 53 (12:12am), 99 (12:49am) - ate cheese crackers, mashed potatoes, beef stew and cereal.  7/13 203 fasting 10:30     Overall blood sugars seem relatively well controlled if patient eats what she should and does not skip meals. Encouraged patient to eat healthy and avoid skipping any meals. Assessment     A1c at goal: No: 7.5 on 6/29/2020 by POC A1C in PCP office. Blood Pressure at goal: Yes 124/80 on 2/12/2020  Weight at goal: No   Physical activity: nothing consistent  Physical activity at goal: no  Smoking Cessation: Yes  Cholesterol at target: Yes: LDL 59  Annual eye exam completed: Due for recheck  Comprehensive Foot Exam Completed:  Yes  Annual urine albumin and serum creatinine: Yes    Statin: Yes    Appropriate?: Yes  Changes made:     ACE/ARB: Yes  Appropriate?: Yes  Changes made:     Aspirin: Yes  Appropriate?: Yes  Changes made:     Eating patterns:    []  My plate    []  Mediterranean diet   []  Low sodium   []  DASH diet   []  Portion control   []  Reduced calorie    []  Fast food / Restaurant  []  High glycemic index foods   []  Sugary beverages   [x]  Other     Comment: see above    Current Medications Affecting Diabetes:  Dulaglutide 1.5mg subQ once weekly on Wednesdays  Insulin glargine 40 units subQ each evening  Metformin XR 1000mg in the morning and 1000 mg XR in the evening  Glipizide 10mg once a day. Compliant: Yes  Barriers to medication therapy: None - continues to use alarm on phone    Medications attempted in the past:  Metformin about 3 years ago with some diarrhea. Unsure if regular or extended release. Tolerating ER well. Recent exacerbations / new problems:  Yeast infections almost completely resolved. Last office dictation reviewed: yes        type 2 DM under stable but not quite at goal control as evidenced by A1C of 7.5 via POC on 2/12/2020     Plan     Physician Follow-up:  Every 3 months     Medication Management Follow-up:   Diabetes Service   Continue:  -Insulin glargine (Lantus) to 40  Units subQ each evening. Patient to call with any blood sugar below 70 or above 250.  -Dulaglutide (Trulicity) 0.8AG subQ once weekly on Wednesdays  -Metformin XR 1000mg in the morning and 1000 mg XR in the evening  -Glipizide 10mg once a day with lunch. Stressed appropriate hypoglycemia management    Check blood sugars three times daily and document in sugar log. Dong down if blood sugars before meal or postprandial.  Also, if blood sugar is below 70 or above 200 try to write why. Patient to resume exercise. Patient to call for eye exam.      Electronically signed by Mario Sanches RPH on 7/13/2020 at 11:35 AM     CLINICAL PHARMACY CONSULT: [facility]CIL[facility] Tr[facility] Only    PHSO: No  Total # of Interventions Recommended: 1  - New Order #: 1 New Medication Order Reason(s):  Adherence  Total Interventions Accepted: 1  Time Spent (min): 40    Satish Sanches, PharmD  [address]

## 2020-07-25 ENCOUNTER — HOSPITAL ENCOUNTER (EMERGENCY)
Age: 65
Discharge: HOME | End: 2020-07-25
Attending: EMERGENCY MEDICINE
Payer: COMMERCIAL

## 2020-07-25 ENCOUNTER — APPOINTMENT (OUTPATIENT)
Dept: GENERAL RADIOLOGY | Age: 65
End: 2020-07-25
Payer: COMMERCIAL

## 2020-07-25 VITALS
OXYGEN SATURATION: 95 % | HEIGHT: 65 IN | HEART RATE: 81 BPM | DIASTOLIC BLOOD PRESSURE: 58 MMHG | WEIGHT: 220 LBS | TEMPERATURE: 98.3 F | SYSTOLIC BLOOD PRESSURE: 116 MMHG | BODY MASS INDEX: 36.65 KG/M2 | RESPIRATION RATE: 12 BRPM

## 2020-07-25 LAB
ABSOLUTE EOS #: 0.2 K/UL (ref 0–0.4)
ABSOLUTE IMMATURE GRANULOCYTE: ABNORMAL K/UL (ref 0–0.3)
ABSOLUTE LYMPH #: 1.9 K/UL (ref 1–4.8)
ABSOLUTE MONO #: 0.8 K/UL (ref 0.1–1.3)
ALBUMIN SERPL-MCNC: 4.4 G/DL (ref 3.5–5.2)
ALBUMIN/GLOBULIN RATIO: ABNORMAL (ref 1–2.5)
ALP BLD-CCNC: 59 U/L (ref 35–104)
ALT SERPL-CCNC: 26 U/L (ref 5–33)
ANION GAP SERPL CALCULATED.3IONS-SCNC: 12 MMOL/L (ref 9–17)
AST SERPL-CCNC: 35 U/L
BASOPHILS # BLD: 0 % (ref 0–2)
BASOPHILS ABSOLUTE: 0 K/UL (ref 0–0.2)
BILIRUB SERPL-MCNC: 0.45 MG/DL (ref 0.3–1.2)
BUN BLDV-MCNC: 30 MG/DL (ref 8–23)
BUN/CREAT BLD: ABNORMAL (ref 9–20)
C-REACTIVE PROTEIN: 0.4 MG/L (ref 0–5)
CALCIUM SERPL-MCNC: 9.5 MG/DL (ref 8.6–10.4)
CHLORIDE BLD-SCNC: 100 MMOL/L (ref 98–107)
CO2: 25 MMOL/L (ref 20–31)
CREAT SERPL-MCNC: 1.2 MG/DL (ref 0.5–0.9)
DIFFERENTIAL TYPE: ABNORMAL
EKG ATRIAL RATE: 76 BPM
EKG P AXIS: 65 DEGREES
EKG P-R INTERVAL: 188 MS
EKG Q-T INTERVAL: 386 MS
EKG QRS DURATION: 70 MS
EKG QTC CALCULATION (BAZETT): 434 MS
EKG R AXIS: 64 DEGREES
EKG T AXIS: 52 DEGREES
EKG VENTRICULAR RATE: 76 BPM
EOSINOPHILS RELATIVE PERCENT: 2 % (ref 0–4)
GFR AFRICAN AMERICAN: 55 ML/MIN
GFR NON-AFRICAN AMERICAN: 45 ML/MIN
GFR SERPL CREATININE-BSD FRML MDRD: ABNORMAL ML/MIN/{1.73_M2}
GFR SERPL CREATININE-BSD FRML MDRD: ABNORMAL ML/MIN/{1.73_M2}
GLUCOSE BLD-MCNC: 170 MG/DL (ref 70–99)
HCT VFR BLD CALC: 37.6 % (ref 36–46)
HEMOGLOBIN: 11.8 G/DL (ref 12–16)
IMMATURE GRANULOCYTES: ABNORMAL %
LYMPHOCYTES # BLD: 20 % (ref 24–44)
MCH RBC QN AUTO: 25.3 PG (ref 26–34)
MCHC RBC AUTO-ENTMCNC: 31.6 G/DL (ref 31–37)
MCV RBC AUTO: 80 FL (ref 80–100)
MONOCYTES # BLD: 8 % (ref 1–7)
NRBC AUTOMATED: ABNORMAL PER 100 WBC
PDW BLD-RTO: 16.4 % (ref 11.5–14.9)
PLATELET # BLD: 297 K/UL (ref 150–450)
PLATELET ESTIMATE: ABNORMAL
PMV BLD AUTO: 8 FL (ref 6–12)
POTASSIUM SERPL-SCNC: 4.5 MMOL/L (ref 3.7–5.3)
RBC # BLD: 4.69 M/UL (ref 4–5.2)
RBC # BLD: ABNORMAL 10*6/UL
SEG NEUTROPHILS: 70 % (ref 36–66)
SEGMENTED NEUTROPHILS ABSOLUTE COUNT: 6.6 K/UL (ref 1.3–9.1)
SODIUM BLD-SCNC: 137 MMOL/L (ref 135–144)
TOTAL PROTEIN: 7.6 G/DL (ref 6.4–8.3)
TROPONIN INTERP: NORMAL
TROPONIN INTERP: NORMAL
TROPONIN T: NORMAL NG/ML
TROPONIN T: NORMAL NG/ML
TROPONIN, HIGH SENSITIVITY: 10 NG/L (ref 0–14)
TROPONIN, HIGH SENSITIVITY: 11 NG/L (ref 0–14)
WBC # BLD: 9.5 K/UL (ref 3.5–11)
WBC # BLD: ABNORMAL 10*3/UL

## 2020-07-25 PROCEDURE — 71045 X-RAY EXAM CHEST 1 VIEW: CPT

## 2020-07-25 PROCEDURE — 84484 ASSAY OF TROPONIN QUANT: CPT

## 2020-07-25 PROCEDURE — 85025 COMPLETE CBC W/AUTO DIFF WBC: CPT

## 2020-07-25 PROCEDURE — 36415 COLL VENOUS BLD VENIPUNCTURE: CPT

## 2020-07-25 PROCEDURE — 80053 COMPREHEN METABOLIC PANEL: CPT

## 2020-07-25 PROCEDURE — 99285 EMERGENCY DEPT VISIT HI MDM: CPT

## 2020-07-25 PROCEDURE — 83880 ASSAY OF NATRIURETIC PEPTIDE: CPT

## 2020-07-25 PROCEDURE — 86140 C-REACTIVE PROTEIN: CPT

## 2020-07-25 ASSESSMENT — ENCOUNTER SYMPTOMS
SORE THROAT: 0
SPUTUM PRODUCTION: 0
CHEST TIGHTNESS: 0
HEMOPTYSIS: 0
VOMITING: 0
COUGH: 0
SHORTNESS OF BREATH: 1
WHEEZING: 0

## 2020-07-25 NOTE — ED PROVIDER NOTES
EMERGENCY DEPARTMENT ENCOUNTER    Pt Name: Erwin Torres  MRN: [id number]  [place] 1955  Date of evaluation: 7/25/20  CHIEF COMPLAINT       Chief Complaint   Patient presents with    Shortness of Breath     HISTORY OF PRESENT ILLNESS     Shortness of Breath   Severity:  Moderate  Onset quality:  Gradual  Progression:  Resolved  Chronicity:  New  Context comment:  Was pushing a stroller full of bottled water from [employer] back to her house because she missed the bus, it is hot outside and pratik  Relieved by:  Rest  Worsened by:  Exertion  Associated symptoms: no chest pain, no cough, no diaphoresis, no fever, no headaches, no hemoptysis, no neck pain, no rash, no sore throat, no sputum production, no syncope, no vomiting and no wheezing    no sick contacts    REVIEW OF SYSTEMS     Review of Systems   Constitutional: Positive for fatigue. Negative for diaphoresis and fever. HENT: Negative for sore throat. Respiratory: Positive for shortness of breath. Negative for cough, hemoptysis, sputum production, chest tightness and wheezing. Cardiovascular: Negative for chest pain, palpitations, leg swelling and syncope. Gastrointestinal: Negative for vomiting. Musculoskeletal: Negative for neck pain. Skin: Negative for rash. Neurological: Negative for dizziness and headaches. All other systems reviewed and are negative. PASTMEDICAL HISTORY     Past Medical History:   Diagnosis Date    Anxiety     Asthma     Common cold     COPD (chronic obstructive pulmonary disease) (HCC)     Depression     Diabetes mellitus (HCC)     Emphysema     Fatty liver     H/O: pneumonia FEB. 2014    Hiatal hernia     Hyperlipidemia     Hypertension     Mitral valve prolapse     Psychosis (HCC)     SOB (shortness of breath) on exertion     Spinal headache     Stroke ([facility] [address] 2013    Mild Lt side    Upper respiratory symptom      Past Problem List  Patient Active Problem List   Diagnosis Code    Complete (TRULICITY) 1.5 WN/3.6GQ SOPN Inject 1.5 mg into the skin once a week Takes on [employer] [facility]      budesonide-formoterol (SYMBICORT) 160-4.5 MCG/ACT AERO 2 puffs twice a dayHistorical Med      Cholecalciferol (VITAMIN D3) 2000 units CAPS Take by mouth dailyHistorical Med      insulin glargine (LANTUS) 100 UNIT/ML injection vial Inject 40 Units into the skin nightly Historical Med      FLUARIX QUADRIVALENT 0.5 ML injection inject 0.5 milliliter intramuscularly, R-0, DAWHistorical Med      losartan (COZAAR) 50 MG tablet Take 50 mg by mouth daily Historical Med      brexpiprazole (REXULTI) 4 MG TABS tablet Take 2 mg by mouth daily Historical Med      rosuvastatin (CRESTOR) 40 MG tablet Take 40 mg by mouth every eveningHistorical Med      montelukast (SINGULAIR) 10 MG tablet Take 10 mg by mouth nightlyHistorical Med      pantoprazole (PROTONIX) 40 MG tablet Take 1 tablet by mouth daily, Disp-30 tablet, R-3      loratadine (CLARITIN) 10 MG tablet Take 10 mg by mouth daily as needed (allergies) Historical Med      ONE TOUCH ULTRA TEST strip Historical Med      albuterol (PROVENTIL) (2.5 MG/3ML) 0.083% nebulizer solution Take 2.5 mg by nebulization every 6 hours as needed for Wheezing. ALLERGIES     is allergic to geodon [ziprasidone hcl]; valium [diazepam]; and seasonal.  FAMILY HISTORY     She indicated that her mother is . She indicated that her father is . She indicated that the status of her son is unknown. She indicated that the status of her other is unknown.      SOCIAL HISTORY       Social History     Tobacco Use    Smoking status: Passive Smoke Exposure - Never Smoker    Smokeless tobacco: Never Used    Tobacco comment: socially smoked when playing cards   Substance Use Topics    Alcohol use: No     Alcohol/week: 0.0 standard drinks     Comment: socially    Drug use: No     PHYSICAL EXAM     INITIAL VITALS: BP (!) 116/58   Pulse 81   Temp 98.3 °F (36.8 °C) (Oral)   Resp 12 Ht 5' 5\" (1.651 m)   Wt 220 lb (99.8 kg)   SpO2 95%   BMI 36.61 kg/m²    Physical Exam  Constitutional:       General: She is not in acute distress. Appearance: Normal appearance. She is well-developed. She is not diaphoretic. HENT:      Head: Normocephalic and atraumatic. Right Ear: External ear normal.      Left Ear: External ear normal.      Nose: Nose normal. No congestion. Mouth/Throat:      Mouth: Mucous membranes are moist.      Pharynx: Oropharynx is clear. Eyes:      General:         Right eye: No discharge. Left eye: No discharge. Conjunctiva/sclera: Conjunctivae normal.      Pupils: Pupils are equal, round, and reactive to light. Neck:      Musculoskeletal: Normal range of motion and neck supple. Trachea: No tracheal deviation. Cardiovascular:      Rate and Rhythm: Normal rate and regular rhythm. Pulses: Normal pulses. Heart sounds: Normal heart sounds. Pulmonary:      Effort: Pulmonary effort is normal. No respiratory distress. Breath sounds: Normal breath sounds. No stridor. No wheezing or rales. Abdominal:      Palpations: Abdomen is soft. Tenderness: There is no abdominal tenderness. There is no guarding or rebound. Musculoskeletal: Normal range of motion. General: No tenderness or deformity. Skin:     General: Skin is warm and dry. Capillary Refill: Capillary refill takes less than 2 seconds. Findings: No erythema or rash. Neurological:      General: No focal deficit present. Mental Status: She is alert and oriented to person, place, and time. Cranial Nerves: No cranial nerve deficit. Coordination: Coordination normal.   Psychiatric:         Mood and Affect: Mood normal.         Behavior: Behavior normal.         Thought Content:  Thought content normal.         Judgment: Judgment normal.         MEDICAL DECISION MAKING:     Patient feeling much better after 1 liter ns  Do not suspect ACS or AMI TO:  Yasmin Lizarraga, APRN - CNP  [address] [facility] [place]   [phone]    Schedule an appointment as soon as possible for a visit in 1 day      DISCHARGE MEDICATIONS:  Discharge Medication List as of 7/25/2020  7:17 PM        Santos Casarez MD  Attending Emergency Physician                    Santos Casarez MD  07/25/20 7947

## 2020-07-25 NOTE — ED NOTES
Mode of arrival:  [facility]      Residence prior to admit: home      Chief complaint on admission: SOB, dizziness  Pt took the bus to the pharmacy to  prescriptions. Pt bought a case of water and found a free stroller. Pt states that she decided to walk home with the stroller and water. Pt states that she became very SOB and needed to sit down. Pt walked to [employer], sat on bench, and called 911. Pt states that she stood up to wave down EMS, she became very dizzy and almost passed out. EMS states that pt's initial SBP was in the 80s. EMS started an IV and gave 500ml of NS. SBP increased. Pt is AOx4, ambulates per self, and is very talkative. Pt does not appear to be in any distress at this time. C= \"Have you ever felt that you should Cut down on your drinking? \"  No  A= \"Have people Annoyed you by criticizing your drinking? \"  No  G= \"Have you ever felt bad or Guilty about your drinking? \"  No  E= \"Have you ever had a drink as an Eye-opener first thing in the morning to steady your nerves or to help a hangover? \"  No      Deferred []      Reason for deferring: N/A    *If yes to two or more: probable alcohol abuse. [address] [facility], RN  07/25/20 5310

## 2020-07-26 LAB
BNP INTERPRETATION: ABNORMAL
PRO-BNP: 386 PG/ML

## 2020-07-27 ENCOUNTER — CARE COORDINATION (OUTPATIENT)
Dept: CARE COORDINATION | Age: 65
End: 2020-07-27

## 2020-07-27 NOTE — CARE COORDINATION
Patient states that he SOB has resolved, very little fatigue. Drinking plenty of fluids. Patient contacted regarding Francoisada Murry. Discussed COVID-19 related testing which was not done at this time. Test results were not done. Patient informed of results, if available? N/A    Care Transition Nurse/ Ambulatory Care Manager contacted the patient by telephone to perform post discharge assessment. Verified name and  with patient as identifiers. Provided introduction to self, and explanation of the CTN/ACM role, and reason for call due to risk factors for infection and/or exposure to COVID-19. Symptoms reviewed with patient who verbalized the following symptoms: fatigue and shortness of breath. Due to no new or worsening symptoms encounter was not routed to provider for escalation. Discussed follow-up appointments. If no appointment was previously scheduled, appointment scheduling offered: Yes  [facility] follow up appointment(s):   Future Appointments   Date Time Provider Mary [place]   2020 10:30 AM ST MEDICATION MGMT [facility]     Non[facility] follow up appointment(s): N/A     Advance Care Planning:   Does patient have an Advance Directive:  not on file; education provided. Patient has following risk factors of: COPD, asthma and diabetes. CTN/ACM reviewed discharge instructions, medical action plan and red flags such as increased shortness of breath, increasing fever and signs of decompensation with patient who verbalized understanding. Discussed exposure protocols and quarantine with CDC Guidelines What to do if you are sick with coronavirus disease .  Patient was given an opportunity for questions and concerns. The patient agrees to contact the [facility] exposure line [phone], local health department [place] Department of Health: ([phone]) and PCP office for questions related to their healthcare. CTN/ACM provided contact information for future needs.     Reviewed and educated patient on any new and changed medications related to discharge diagnosis     Patient/family/caregiver given information for [employer] [facility] and agrees to enroll no    Plan for follow-up call in 5-7 days based on severity of symptoms and risk factors.

## 2020-08-03 ENCOUNTER — CARE COORDINATION (OUTPATIENT)
Dept: CARE COORDINATION | Age: 65
End: 2020-08-03

## 2020-08-03 NOTE — CARE COORDINATION
Patient contacted regarding COVID-19 risk and screening. Discussed COVID-19 related testing which was not done at this time. Test results were not done. Patient informed of results, if available? N/A. Care Transition Nurse/ Ambulatory Care Manager contacted the patient by telephone to perform follow-up assessment. Verified name and  with patient as identifiers. Patient has following risk factors of: COPD and diabetes. Symptoms reviewed with patient who verbalized the following symptoms: no new symptoms and no worsening symptoms. Due to no new or worsening symptoms encounter was not routed to provider for escalation. Education provided regarding infection prevention, and signs and symptoms of COVID-19 and when to seek medical attention with patient who verbalized understanding. Discussed exposure protocols and quarantine from [address] you at higher risk for severe illness  and given an opportunity for questions and concerns. The patient agrees to contact the COVID-19 hotline [phone] or PCP office for questions related to their healthcare. CTN/ACM provided contact information for future reference. From CDC: Are you at higher risk for severe illness?  Wash your hands often.  Avoid close contact (6 feet, which is about two arm lengths) with people who are sick.  Put distance between yourself and other people if COVID-19 is spreading in your community.  Clean and disinfect frequently touched surfaces.  Avoid all cruise travel and non-essential air travel.  Call your healthcare professional if you have concerns about COVID-19 and your underlying condition or if you are sick. For more information on steps you can take to protect yourself, see CDC's How to [address] [place] [address] for follow-up call in 7-14 days based on severity of symptoms and risk factors.

## 2020-08-04 ENCOUNTER — TELEPHONE (OUTPATIENT)
Dept: PHARMACY | Age: 65
End: 2020-08-04

## 2020-08-05 ENCOUNTER — HOSPITAL ENCOUNTER (OUTPATIENT)
Dept: PHARMACY | Age: 65
Setting detail: THERAPIES SERIES
Discharge: HOME | End: 2020-08-05
Payer: COMMERCIAL

## 2020-08-05 VITALS — TEMPERATURE: 96.9 F | WEIGHT: 218.8 LBS | BODY MASS INDEX: 36.41 KG/M2

## 2020-08-05 PROCEDURE — 99213 OFFICE O/P EST LOW 20 MIN: CPT

## 2020-08-05 NOTE — PROGRESS NOTES
Diabetic Medication Management   [address], [address] [place] [facility] [place]  Phone: [phone]  Fax: [phone]    NAME: [facility] RECORD NUMBER:  [id number]  AGE: 62 y.o. GENDER: female  : 1955  EPISODE DATE:  2020       Ms. Collins Ibanez was referred to [facility] Medication Management Services by Dr. Tim Sinclair, Special instructions include: follow up every 3 months with physician in office     Goals per referral:   Fasting blood glucose:   Peak postprandial glucose: < 180  A1C: < 7%    Other goals:  Blood pressure goal: 130/80    Weight loss goal (~10%): Target weight  200 to be reached by date: 2020 (3-6 months)  Physical Activity goal:    15 minutes 5 times per week to be reached by date: 2020 (3-6 months)  Smoking Cessation   Quit Date: never smoked  Cholesterol at target:Yes    Date: 2020  Annual eye exam:    Date: 2019 - needs appt  Comprehensive annual foot exam:   Date: 19    Annual urine Albumin and serum creatinine:   Date: 2020    Patient Specific Goals:  1. Lower blood sugars  2. Lower A1C    Subjective   Ms. Collins Ibanez is a 62 y.o. female here for the Diabetes Service for self-management education, medication review including over the counter medications and herbal products, overall wellbeing assessment, transition of care and any needed adjustments with updates and recommendations communicated to the referring physician. Patient's name and  verified. Patient visit done over phone due to [address] restrictions and limiting potential exposure. Patient Findings:    Patient received shingles vaccine and afterwards her blood pressure went down to 80/43. Patient felt poorly and EMS was called. Came to [facility] ED and was told she was dehydrated. Was given fluids and blood pressure increased to 116/58. Patient was discharged and gradually felt better.   Has been drinking gatorade and has not had any further episodes. Does check blood pressure at home and most recent value 139/76. Patient states anxiety has been under control. Patient refilled refrigerator with healthier options. Various meats, salad, tomatoes, onions, oranges, apples, and other various fruit. Continues to report missing her insulin twice since last appt due to falling asleep. Taking it about 7pm.  Alarm on cell phone is not working as her phone was put in the washer. Will get another cell phone and reset alarm. Taking insulin in am is an issue as well as patient sleeps in. Will continue insulin at night but if continues to miss doses will have to look at another time of the day to take. Patient states her pill packs have been showing she is taking her oral tablets on time and not missing any doses. Does not think she is missing meals at all lately. Patient has had two episodes of hypoglycemia in last month. One episode appeared to happen after she skiped a meal. Discussed importance of eating regular meals and reviewed glucose log so patient could see relationship to low blood sugars. Patient reports eating franck noodles after one episode. Discussed appropriate things to eat to bring up blood sugar and to retest blood sugar which patient has been doing. Blood sugar has not been going up as high after hypoglycemia as patient has not been overeating as much. No change in color or size of growth on uvula. Next visit with PCP in Sept.  Will also see endocrinology in Sept. (9/23)    No regular exercise other than walking to the store a few times a week. Has not been using stationary bike regularly. Had POC A1c done on 6/29/20 at PCP office reported at 7.5 wish is stable from Feb 2020    Patient encouraged to call her eye doctor to make an appt as due for visit. Has enough refills on all diabetic medications/supplies.  Stopped naproxen as directed by ED    [x]  Missed doses   [x]  Emergency Room CREATININE 1.20 (H) 07/25/2020    BUN 30 (H) 07/25/2020     07/25/2020    K 4.5 07/25/2020     07/25/2020    CO2 25 07/25/2020     Lab Results   Component Value Date    ALT 26 07/25/2020       Weight:  Wt Readings from Last 3 Encounters:   08/05/20 218 lb 12.8 oz (99.2 kg)   07/25/20 220 lb (99.8 kg)   07/13/20 219 lb 6.4 oz (99.5 kg)       Current medications:  Prior to Admission medications    Medication Sig Start Date End Date Taking?  Authorizing Provider   glipiZIDE (GLUCOTROL) 10 MG tablet Take 1 tablet by mouth Daily with supper  Patient taking differently: Take 10 mg by mouth Daily with lunch  4/16/20  Yes Magali Santana   metFORMIN (GLUCOPHAGE XR) 500 MG extended release tablet Take 2 tablets by mouth 2 times daily (with meals) 9/4/19  Yes Pili Goldman   aspirin 81 MG chewable tablet Take 81 mg by mouth   Yes Historical Provider, MD   Dulaglutide (TRULICITY) 1.5 QB/7.6RG SOPN Inject 1.5 mg into the skin once a week Takes on WEd 1/12/18  Yes Historical Provider, MD   insulin glargine (LANTUS) 100 UNIT/ML injection vial Inject 40 Units into the skin nightly    Yes Historical Provider, MD   albuterol sulfate  (90 Base) MCG/ACT inhaler Inhale 2 puffs into the lungs every 6 hours as needed for Wheezing    Historical Provider, MD   Melatonin 5 MG CAPS Take 1 capsule by mouth nightly as needed (sleep)    Historical Provider, MD   tiotropium (SPIRIVA RESPIMAT) 2.5 MCG/ACT AERS inhaler Inhale 2 puffs into the lungs daily    Historical Provider, MD   escitalopram (LEXAPRO) 20 MG tablet Take 20 mg by mouth daily     Historical Provider, MD   metoprolol succinate (TOPROL XL) 50 MG extended release tablet Take 50 mg by mouth daily  1/12/18   Historical Provider, MD   budesonide-formoterol (SYMBICORT) 160-4.5 MCG/ACT AERO 2 puffs twice a day 5/1/18   Historical Provider, MD   Cholecalciferol (VITAMIN D3) 2000 units CAPS Take by mouth daily    Historical Provider, MD Mary Muhammad no  Smoking Cessation: Yes  Cholesterol at target: Yes: LDL 59  Annual eye exam completed: Due for recheck  Comprehensive Foot Exam Completed: Yes  Annual urine albumin and serum creatinine: Yes    Statin: Yes    Appropriate?: Yes  Changes made:     ACE/ARB: Yes  Appropriate?: Yes  Changes made:     Aspirin: Yes  Appropriate?: Yes  Changes made:     Eating patterns:    []  My plate    []  Mediterranean diet   []  Low sodium   []  DASH diet   []  Portion control   []  Reduced calorie    []  Fast food / Restaurant  []  High glycemic index foods   []  Sugary beverages   [x]  Other     Comment: see above    Current Medications Affecting Diabetes:  Dulaglutide 1.5mg subQ once weekly on Wednesdays  Insulin glargine 40 units subQ each evening  Metformin XR 1000mg in the morning and 1000 mg XR in the evening  Glipizide 10mg once a day. Compliant: Yes  Barriers to medication therapy: Yes - alarm on phone is not working. Getting a new phone and will set alarm on TV    Medications attempted in the past:  Metformin about 3 years ago with some diarrhea. Unsure if regular or extended release. Tolerating ER well. Recent exacerbations / new problems:  Yeast infections almost completely resolved. Last office dictation reviewed: yes        type 2 DM under stable but not quite at goal control as evidenced by A1C of 7.5 via POC on 2/12/2020     Plan     Physician Follow-up:  Every 3 months     Medication Management Follow-up:   Diabetes Service   Increase Insulin glargine (Lantus) to 42 Units subQ each evening. Patient to call with any blood sugar below 70 or above 250. Continue Dulaglutide (Trulicity) 4.1TO subQ once weekly on Wednesdays  -Metformin XR 1000mg in the morning and 1000 mg XR in the evening    Discontinue Glipizide 10mg once a day with lunch. Patient seems to be having hypoglycemia later in the day which may be caused by glipizide effects when she does not eat as much or skips doses.     Stressed appropriate hypoglycemia management    Check blood sugars three times daily and document in sugar log. Dong down if blood sugars before meal or postprandial.  Also, if blood sugar is below 70 or above 200 try to write why. Patient to resume exercise.     Patient to call for eye exam.      Electronically signed by Emily Sanches [facility] on 8/5/2020 at 11:15 AM     CLINICAL PHARMACY CONSULT: MED RECONCILIATION/REVIEW ADDENDUM    For Pharmacy Admin Tracking Only    PHSO: No  Total # of Interventions Recommended: 2  - Increased Dose #: 1  - Discontinued Medication #: 1 Discontinue Reason(s): Unnecessary Medication  Total Interventions Accepted: 2  Time Spent (min): 40    Satish Sanches, PharmD  [address]

## 2020-08-11 ENCOUNTER — CARE COORDINATION (OUTPATIENT)
Dept: CARE COORDINATION | Age: 65
End: 2020-08-11

## 2020-08-11 NOTE — CARE COORDINATION
You Patient resolved from the Care Transitions episode on 8/11/2020  Discussed COVID-19 related testing which was not done at this time. Test results were not done. Patient informed of results, if available? N/A    Patient/family has been provided the following resources and education related to COVID-19:                         Signs, symptoms and red flags related to COVID-19            [facility] exposure and quarantine guidelines            [facility] exposure contact - [phone]            Contact for their local Department of Health                 Patient currently reports that the following symptoms have improved:  fatigue, shortness of breath and no new/worsening symptoms     No further outreach scheduled with this CTN/ACM. Episode of Care resolved. Patient has this CTN/ACM contact information if future needs arise.

## 2020-08-18 ENCOUNTER — TELEPHONE (OUTPATIENT)
Dept: PHARMACY | Age: 65
End: 2020-08-18

## 2020-08-18 NOTE — TELEPHONE ENCOUNTER
Spoke with patient for COVID 19 screening secondary to upcoming appointment tomorrow . At this time patient denies symptoms, recent travel and exposure. Patient will report to [facility] for Diabetes appointment at scheduled time. Patient educated to call physician or go to ER with respiratory symptoms or fever and to not present to appointment if symptomatic.

## 2020-08-19 ENCOUNTER — HOSPITAL ENCOUNTER (OUTPATIENT)
Dept: PHARMACY | Age: 65
Setting detail: THERAPIES SERIES
Discharge: HOME | End: 2020-08-19
Payer: COMMERCIAL

## 2020-08-19 VITALS — WEIGHT: 219.7 LBS | BODY MASS INDEX: 36.56 KG/M2 | TEMPERATURE: 97.5 F

## 2020-08-19 PROCEDURE — 99213 OFFICE O/P EST LOW 20 MIN: CPT

## 2020-08-19 RX ORDER — INSULIN GLARGINE 100 [IU]/ML
45 INJECTION, SOLUTION SUBCUTANEOUS NIGHTLY
Qty: 5 PEN | Refills: 3 | OUTPATIENT
Start: 2020-08-19 | End: 2020-10-07 | Stop reason: DRUGHIGH

## 2020-08-19 NOTE — PROGRESS NOTES
Diabetic Medication Management   [address] [place], [address] [place]  Phone: [phone]  Fax: [phone]    NAME: [facility] RECORD NUMBER:  [id number]  AGE: 62 y.o. GENDER: female  : 1955  EPISODE DATE:  2020       Ms. Tawana Vigil was referred to [address] [place] [address] Medication Management Services by Dr. Eun Granger, Special instructions include: follow up every 3 months with physician in office     Goals per referral:   Fasting blood glucose:   Peak postprandial glucose: < 180  A1C: < 7%    Other goals:  Blood pressure goal: 130/80    Weight loss goal (~10%): Target weight  200 to be reached by date: 2020 (3-6 months)  Physical Activity goal:    15 minutes 5 times per week to be reached by date: 2020 (3-6 months)  Smoking Cessation   Quit Date: never smoked  Cholesterol at target:Yes    Date: 2020  Annual eye exam:    Date: 2019 - needs appt  Comprehensive annual foot exam:   Date: 19    Annual urine Albumin and serum creatinine:   Date: 2020    Patient Specific Goals:  1. Lower blood sugars  2. Lower A1C    Subjective   Ms. Tawana Vigil is a 62 y.o. female here for the Diabetes Service for self-management education, medication review including over the counter medications and herbal products, overall wellbeing assessment, transition of care and any needed adjustments with updates and recommendations communicated to the referring physician. Patient's name and  verified. Patient visit done over phone due to [address] restrictions and limiting potential exposure. Patient Findings:    Patient reports nausea/vomitting on . This caused her to miss her metformin  evening dose and evening on . Patient reports stomach feels better this am.  Has not eaten yet. Plans to go home and eat after visit. Blood sugar in office reported at 201. No further hypotension.    Does check blood pressure at home and most recent value 140/73 which was higher than normal - Systolic usually closer to 120 per patient. .      Has not missed any doses of her insulin since last appt. No further episodes of hypoglycemia since stopping glipizide and patient is very happy with this. Patient has been taking glipizide out of pill packs. Overall looks like blood sugars have increased slightly so will likely have to increase insulin glargine to achieve better glucose control. No change in color or size of growth on uvula. Next visit with PCP in Sept.  Will also see endocrinology in Sept. (9/23)    Still regular exercise other than walking to the store a few times a week. Has not been using stationary bike regularly because it is in the kitchen and it is too hot in there. Needs to move to living room but not enough room. Needs to clean and get rid of stuff to make room. Had POC A1c done on 6/29/20 at PCP office reported at 7.5 which is stable from Feb 2020    Patient encouraged to call her eye doctor to make an appt as due for visit. Has enough refills on all diabetic medications/supplies.       []  Missed doses   []  Emergency Room Visit    []  Medication changes  []  Hospitalization   []  Diet changes   []  Acute illness   [x]  Activity changes      Symptoms of hypoglycemia -     [x]  None   []  Shakiness    []  Lightheadedness or dizziness   []  Confusion      []  Sweating   []  Other       Symptoms of hyperglycemia   [x]  None   []  Frequent urination    []  Increased thirst   []  Other    Medication adverse reactions   [x]  None   []  Diarrhea / Nausea / Vomiting / Constipation / flatulence   []  Hypertension   []  Peripheral edema     []  Signs of infection   []  Headache   []  Vision changes   []  Increased cholesterol    []  Weight gain   []  Change in renal function   []  Increased potassium  []  Other      Comments:      If recent hospital admission / ED visit, was this related to Diabetes No:hypotension Discharge date July 2020    Objective     PMHx:    Past Medical History:   Diagnosis Date    Anxiety     Asthma     Common cold     [facility]     Depression     Diabetes mellitus (HCC)     Emphysema     Fatty liver     H/O: pneumonia FEB. 2014    Hiatal hernia     Hyperlipidemia     Hypertension     Mitral valve prolapse     Psychosis (HCC)     SOB (shortness of breath) on exertion     Spinal headache     Stroke ([facility] 2013    Mild Lt side    Upper respiratory symptom        Social History:    Social History     Tobacco Use    Smoking status: Passive Smoke Exposure - Never Smoker    Smokeless tobacco: Never Used    Tobacco comment: socially smoked when playing cards   Substance Use Topics    Alcohol use: No     Alcohol/week: 0.0 standard drinks     Comment: socially       Pertinent Labs:    Lab Results   Component Value Date    LABA1C 7.5 06/29/2020    LABA1C 7.5 02/12/2020    LABA1C 6.8 10/28/2019     Lab Results   Component Value Date    CHOL 145 02/12/2020    TRIG 158 02/12/2020    HDL 54 02/12/2020    LDLCALC 59 02/12/2020     Lab Results   Component Value Date    CREATININE 1.20 (H) 07/25/2020    BUN 30 (H) 07/25/2020     07/25/2020    K 4.5 07/25/2020     07/25/2020    CO2 25 07/25/2020     Lab Results   Component Value Date    ALT 26 07/25/2020       Weight:  Wt Readings from Last 3 Encounters:   08/19/20 219 lb 11.2 oz (99.7 kg)   08/05/20 218 lb 12.8 oz (99.2 kg)   07/25/20 220 lb (99.8 kg)       Current medications:  Prior to Admission medications    Medication Sig Start Date End Date Taking?  Authorizing Provider   insulin glargine (LANTUS) 100 UNIT/ML injection vial Inject 45 Units into the skin nightly    Yes Historical Provider, MD   albuterol sulfate  (90 Base) MCG/ACT inhaler Inhale 2 puffs into the lungs every 6 hours as needed for Wheezing    Historical Provider, MD   metFORMIN (GLUCOPHAGE XR) 500 MG extended release tablet Take 2 tablets by mouth 2 times daily (with meals) 9/4/19   Kyle Kishore   Melatonin 5 MG CAPS Take 1 capsule by mouth nightly as needed (sleep)    Historical Provider, MD   tiotropium (SPIRIVA RESPIMAT) 2.5 MCG/ACT AERS inhaler Inhale 2 puffs into the lungs daily    Historical Provider, MD   escitalopram (LEXAPRO) 20 MG tablet Take 20 mg by mouth daily     Historical Provider, MD   aspirin 81 MG chewable tablet Take 81 mg by mouth    Historical Provider, MD   metoprolol succinate (TOPROL XL) 50 MG extended release tablet Take 50 mg by mouth daily  1/12/18   Historical Provider, MD   Dulaglutide (TRULICITY) 1.5 JH/1.3VH SOPN Inject 1.5 mg into the skin once a week Takes on WEd 1/12/18   Historical Provider, MD   budesonide-formoterol (SYMBICORT) 160-4.5 MCG/ACT AERO 2 puffs twice a day 5/1/18   Historical Provider, MD   Cholecalciferol (VITAMIN D3) 2000 units CAPS Take by mouth daily    Historical Provider, MD   FLUARIX QUADRIVALENT 0.5 ML injection inject 0.5 milliliter intramuscularly 8/4/17   Historical Provider, MD   losartan (COZAAR) 50 MG tablet Take 50 mg by mouth daily  10/25/17   Historical Provider, MD   brexpiprazole (REXULTI) 4 MG TABS tablet Take 2 mg by mouth daily     Historical Provider, MD   rosuvastatin (CRESTOR) 40 MG tablet Take 40 mg by mouth every evening    Historical Provider, MD   montelukast (SINGULAIR) 10 MG tablet Take 10 mg by mouth nightly    Historical Provider, MD   pantoprazole (PROTONIX) 40 MG tablet Take 1 tablet by mouth daily 7/20/16   Anna Moreno PA-C   loratadine (CLARITIN) 10 MG tablet Take 10 mg by mouth daily as needed (allergies)     Historical Provider, MD   ONE TOUCH ULTRA TEST strip  9/23/15   Historical Provider, MD   albuterol (PROVENTIL) (2.5 MG/3ML) 0.083% nebulizer solution Take 2.5 mg by nebulization every 6 hours as needed for Wheezing.     Historical Provider, MD       Immunizations:   Most Recent Immunizations   Administered Date(s) Administered    Influenza Virus Vaccine 11/20/2015    Pneumococcal Conjugate 13-valent (Nomnvar29) 11/20/2015       Home Blood Glucose Results:     Blood glucose trends noted:      Date  Fasting   Before lunch   Before dinner  Bedtime  8/18  169   241   244  8/17  150   89   150  8/16  194   110     8/15     111   128  8/14  125   207   157  8/13  132   111  8/12  128   154  8/11  167   182   124  8/10  168   185   156  8/9        100  8/8  167   124   147  8/7  157      105  8/6  146   152     8/5  233   179   229       Assessment     A1c at goal: No: 7.5 on 6/29/2020 by POC A1C in PCP office. Blood Pressure at goal: Yes 116/58 on 7/25/20  Weight at goal: No   Physical activity: nothing consistent  Physical activity at goal: no  Smoking Cessation: Yes  Cholesterol at target: Yes: LDL 59  Annual eye exam completed: Due for recheck  Comprehensive Foot Exam Completed: Yes  Annual urine albumin and serum creatinine: Yes    Statin: Yes    Appropriate?: Yes  Changes made:     ACE/ARB: Yes  Appropriate?: Yes  Changes made:     Aspirin: Yes  Appropriate?: Yes  Changes made:     Eating patterns:    []  My plate    []  Mediterranean diet   []  Low sodium   []  DASH diet   []  Portion control   []  Reduced calorie    []  Fast food / Restaurant  []  High glycemic index foods   []  Sugary beverages   [x]  Other     Comment: see above    Current Medications Affecting Diabetes:  Dulaglutide 1.5mg subQ once weekly on Wednesdays  Insulin glargine 42 units subQ each evening  Metformin XR 1000mg in the morning and 1000 mg XR in the evening       Compliant: Yes  Barriers to medication therapy: Yes - did get new phone and set alarm    Medications attempted in the past:  Metformin about 3 years ago with some diarrhea. Unsure if regular or extended release. Tolerating ER well. Recent exacerbations / new problems:  Yeast infections almost completely resolved.        Last office dictation reviewed: yes        type 2 DM under stable but not quite at goal control as evidenced by A1C of 7.5 via POC on 2/12/2020     Plan     Physician Follow-up:  Every 3 months     Medication Management Follow-up:   Diabetes Service   Increase Insulin glargine (Lantus) to 45 Units subQ each evening. Patient to call with any blood sugar below 80 or above 250. Continue Dulaglutide (Trulicity) 8.9NY subQ once weekly on Wednesdays  -Metformin XR 1000mg in the morning and 1000 mg XR in the evening    Check blood sugars three times daily and document in sugar log. Dong down if blood sugars before meal or postprandial.  Also, if blood sugar is below 70 or above 200 try to write why. Patient to resume exercise.     Patient to call for eye exam.    Electronically signed by Zee Sanches RPH on 8/19/2020 at 4:04 PM     CLINICAL PHARMACY CONSULT: MED RECONCILIATION/REVIEW ADDENDUM    For Pharmacy Admin Tracking Only    PHSO: No  Total # of Interventions Recommended: 2  - Increased Dose #: 1  - Discontinued Medication #: 1 Discontinue Reason(s): Unnecessary Medication  Total Interventions Accepted: 2  Time Spent (min): 40    Satish Sanches, PharmD  [address]

## 2020-09-01 ENCOUNTER — TELEPHONE (OUTPATIENT)
Dept: PHARMACY | Age: 65
End: 2020-09-01

## 2020-09-01 NOTE — TELEPHONE ENCOUNTER
Spoke with patient for COVID 19 screening secondary to upcoming appointment tomorrow . At this time patient denies symptoms, recent travel and exposure. Patient will report to [facility] for diabetes appointment at scheduled time. Patient educated to call physician or go to ER with respiratory symptoms or fever and to not present to appointment if symptomatic. Will coordinate for next diabetes appointment accordingly.      Rosy Palafox, PharmD, 9/1/2020 4:22 PM

## 2020-09-02 ENCOUNTER — TELEPHONE (OUTPATIENT)
Dept: PHARMACY | Age: 65
End: 2020-09-02

## 2020-09-02 NOTE — TELEPHONE ENCOUNTER
Patient called [facility] this morning to cancel appointment for Diabetes. States she is not feeling well at all. Had vomiting on Sunday and diarrhea on Monday and Tuesday. Feels like she could vomit again today. States it is worse than her usual. Discussed with patient contacting physician to be seen. Reports she has been sleeping a lot and missed her insulin Sunday and Monday. Blood sugars have been in the 200s. 262 yesterday at 4pm when she woke up and ate a sandwich. Reports no low blood sugars. Prior to this past weekend, blood sugars have been much better- Fasting 128 and 131. Prior to lunch 117, 134, 137 and prior to dinner 118, 93, and 109. Discussed importance of not missing insulin. Appointment rescheduled for 9/9. Patient to call physician for appointment for N/V/D.    Ese Shea, Pharm D, [facility]  [facility]  9/2/2020 9:03 AM

## 2020-09-08 ENCOUNTER — TELEPHONE (OUTPATIENT)
Dept: PHARMACY | Age: 65
End: 2020-09-08

## 2020-09-08 NOTE — TELEPHONE ENCOUNTER
Attempted to call patient for COVID 19 screening secondary to upcoming appointment 9/8 . At this time patient is unavailable so voicemail left for patient to call us with any covic symptoms, contact or recent travel. Patient will report to [facility] for diabetes appt at scheduled time. Patient educated to call physician or go to ER with respiratory symptoms or fever and to not present to appointment if symptomatic. Will coordinate for next diabetes appt accordingly.      Anne Sanches, PharmD, 9/8/2020 4:55 PM

## 2020-09-09 ENCOUNTER — TELEPHONE (OUTPATIENT)
Dept: PHARMACY | Age: 65
End: 2020-09-09

## 2020-09-09 ENCOUNTER — HOSPITAL ENCOUNTER (OUTPATIENT)
Dept: PHARMACY | Age: 65
Setting detail: THERAPIES SERIES
Discharge: HOME | End: 2020-09-09
Payer: COMMERCIAL

## 2020-09-09 VITALS — TEMPERATURE: 97.3 F | BODY MASS INDEX: 36.16 KG/M2 | WEIGHT: 217.3 LBS

## 2020-09-09 PROCEDURE — 99213 OFFICE O/P EST LOW 20 MIN: CPT

## 2020-09-09 RX ORDER — GLUCOSAMINE HCL/CHONDROITIN SU 500-400 MG
CAPSULE ORAL
Qty: 200 STRIP | Refills: 3 | OUTPATIENT
Start: 2020-09-09 | End: 2022-03-30 | Stop reason: SDUPTHER

## 2020-09-09 RX ORDER — METFORMIN HYDROCHLORIDE 500 MG/1
1000 TABLET, EXTENDED RELEASE ORAL 2 TIMES DAILY WITH MEALS
Qty: 120 TABLET | Refills: 3 | OUTPATIENT
Start: 2020-09-09 | End: 2022-03-30 | Stop reason: SDUPTHER

## 2020-09-09 RX ORDER — LANCETS
EACH MISCELLANEOUS
Qty: 200 EACH | Refills: 3 | OUTPATIENT
Start: 2020-09-09 | End: 2020-10-21 | Stop reason: SDUPTHER

## 2020-09-09 NOTE — PROGRESS NOTES
Diabetic Medication Management   [address].  Phone: [phone]  Fax: [phone]    NAME: Natty [facility] RECORD NUMBER:  [id number]  AGE: 62 y.o. GENDER: female  : 1955  EPISODE DATE:  2020       Ms. Oziel Sheets was referred to [address] [place] [address] Medication Management Services by Dr. Lisy Anand, Special instructions include: follow up every 3 months with physician in office     Goals per referral:   Fasting blood glucose:   Peak postprandial glucose: < 180  A1C: < 7%    Other goals:  Blood pressure goal: 130/80    Weight loss goal (~10%): Target weight  200 to be reached by date: 2020 (3-6 months)  Physical Activity goal:    15 minutes 5 times per week to be reached by date: 2020 (3-6 months)  Smoking Cessation   Quit Date: never smoked  Cholesterol at target:Yes    Date: 2020  Annual eye exam:    Date: 2019 - needs appt  Comprehensive annual foot exam:   Date: 19    Annual urine Albumin and serum creatinine:   Date: 2020    Patient Specific Goals:  1. Lower blood sugars  2. Lower A1C    Subjective   Ms. Oziel Sheets is a 62 y.o. female here for the Diabetes Service for self-management education, medication review including over the counter medications and herbal products, overall wellbeing assessment, transition of care and any needed adjustments with updates and recommendations communicated to the referring physician. Patient's name and  verified. Patient visit done over phone due to [address] restrictions and limiting potential exposure. Patient Findings:    Patient was ill with nausea, vomiting x 1 day and diarrhea for about 4 days  thru . Patient reports not eating and not taking insulin during that time. Blood sugars went up to high 200's. Patient was educated to not skip her insulin when she is ill. Discussed affect of illness on blood sugar.   Patient states she understands. Did also miss her dose of insulin last night due to falling asleep. Patient reports not missing as much lately and doing better overall. Patient reports taking 48 units of Lantus (insulin glargine) every evening since last appt. Patient misunderstood instructions. Encouraged patient to always look at written instructions to make sure she makes correct medication changes. Patient has not been checking blood pressure as has been feeling better. Encouraged to call physician with any significant hypotension. No further episodes of hypoglycemia. No change in color or size of growth on uvula. Next visit with PCP in Sept.  Will also see endocrinology in Sept. (9/29)    Patient states she has been very busy with laundry and cleaning but not formal exercise. Encouraged to use stationary bike regularly. Bike remains in kitchen but may resume habit of getting a cup of coffee and drinking while using bike and putting down when she has to use her arms. Patient encouraged to call her eye doctor to make an appt as due for visit and has not done since last appt. Patient needs refills on glucose test strips (Accucheck Shelbi plus), metformin, lancets and pen needles.    Strips, metformin and l[facility]ets sent [facility] [address] and Pen needles to [facility] per patient request.    Next appt with Dr. Raul Menjivar on 9/23    []  Missed doses   []  Emergency Room Visit    []  Medication changes  []  Hospitalization   []  Diet changes   [x]  Acute illness   []  Activity changes      Symptoms of hypoglycemia -     [x]  None   []  Shakiness    []  Lightheadedness or dizziness   []  Confusion      []  Sweating   []  Other       Symptoms of hyperglycemia   [x]  None   []  Frequent urination    []  Increased thirst   []  Other    Medication adverse reactions   [x]  None   []  Diarrhea / Nausea / Vomiting / Constipation / flatulence   []  Hypertension   []  Peripheral edema     []  Signs of injection pen Inject 45 Units into the skin nightly  Patient taking differently: Inject 48 Units into the skin nightly  8/19/20  Yes Dank Santana   metFORMIN (GLUCOPHAGE XR) 500 MG extended release tablet Take 2 tablets by mouth 2 times daily (with meals) 9/4/19  Yes Giuseppe Sharifa   aspirin 81 MG chewable tablet Take 81 mg by mouth   Yes Historical Provider, MD   Dulaglutide (TRULICITY) 1.5 JR/6.7NI SOPN Inject 1.5 mg into the skin once a week Takes on WEd 1/12/18  Yes Historical Provider, MD   ONE TOUCH ULTRA TEST strip  9/23/15  Yes Historical Provider, MD   albuterol sulfate  (90 Base) MCG/ACT inhaler Inhale 2 puffs into the lungs every 6 hours as needed for Wheezing    Historical Provider, MD   Melatonin 5 MG CAPS Take 1 capsule by mouth nightly as needed (sleep)    Historical Provider, MD   tiotropium (SPIRIVA RESPIMAT) 2.5 MCG/ACT AERS inhaler Inhale 2 puffs into the lungs daily    Historical Provider, MD   escitalopram (LEXAPRO) 20 MG tablet Take 20 mg by mouth daily     Historical Provider, MD   metoprolol succinate (TOPROL XL) 50 MG extended release tablet Take 50 mg by mouth daily  1/12/18   Historical Provider, MD   budesonide-formoterol (SYMBICORT) 160-4.5 MCG/ACT AERO 2 puffs twice a day 5/1/18   Historical Provider, MD   Cholecalciferol (VITAMIN D3) 2000 units CAPS Take by mouth daily    Historical Provider, MD   FLUARIX QUADRIVALENT 0.5 ML injection inject 0.5 milliliter intramuscularly 8/4/17   Historical Provider, MD   losartan (COZAAR) 50 MG tablet Take 50 mg by mouth daily  10/25/17   Historical Provider, MD   brexpiprazole (REXULTI) 4 MG TABS tablet Take 2 mg by mouth daily     Historical Provider, MD   rosuvastatin (CRESTOR) 40 MG tablet Take 40 mg by mouth every evening    Historical Provider, MD   montelukast (SINGULAIR) 10 MG tablet Take 10 mg by mouth nightly    Historical Provider, MD   pantoprazole (PROTONIX) 40 MG tablet Take 1 tablet by mouth daily 7/20/16   Gurpreet Garcia PA-C   loratadine (CLARITIN) 10 MG tablet Take 10 mg by mouth daily as needed (allergies)     Historical Provider, MD   albuterol (PROVENTIL) (2.5 MG/3ML) 0.083% nebulizer solution Take 2.5 mg by nebulization every 6 hours as needed for Wheezing. Historical Provider, MD       Immunizations:   Most Recent Immunizations   Administered Date(s) Administered    Influenza Virus Vaccine 11/20/2015    Pneumococcal Conjugate 13-valent (Brice Aggie) 11/20/2015       Home Blood Glucose Results:     Blood glucose trends noted:      Date  Fasting   Before lunch   Before dinner  Bedtime    9/9  174  9/8  182   164   127  9/7        111  9/6  142   104   137  9/5  153   98   236 (unsure what she ate)  9/4     86   135  9/3  100   171   217  9/2  185   127   204  9/1  197   159   262  8/31  174   164   160  8/30  131  8/29  137   109   144  8/28  142   134   93  8/27  128   117   118  8/26  91   136   134  8/25  178   157   139  8/24  155   154   162  8/23  187   118   180  8/22  193   185   151  8/21  160      228  8/20  113   119   171  8/19  201                Assessment     A1c at goal: No: 7.5 on 6/29/2020 by POC A1C in PCP office. Blood Pressure at goal: Yes 116/58 on 7/25/20  Weight at goal: No   Physical activity: nothing consistent  Physical activity at goal: no  Smoking Cessation: Yes  Cholesterol at target: Yes: LDL 59  Annual eye exam completed: Due for recheck  Comprehensive Foot Exam Completed:  Yes  Annual urine albumin and serum creatinine: Yes    Statin: Yes    Appropriate?: Yes  Changes made:     ACE/ARB: Yes  Appropriate?: Yes  Changes made:     Aspirin: Yes  Appropriate?: Yes  Changes made:     Eating patterns:    []  My plate    []  Mediterranean diet   []  Low sodium   []  DASH diet   []  Portion control   []  Reduced calorie    []  Fast food / Restaurant  []  High glycemic index foods   []  Sugary beverages   [x]  Other     Comment: see above    Current Medications Affecting Diabetes:  Dulaglutide 1.5mg subQ once weekly on Wednesdays  Insulin glargine 48 units subQ each evening  Metformin XR 1000mg in the morning and 1000 mg XR in the evening       Compliant: No skipped insulin when ill and fell asleep last evening. Barriers to medication therapy: Yes - did get new phone and set alarm    Medications attempted in the past:  Metformin about 3 years ago with some diarrhea. Unsure if regular or extended release. Tolerating ER well. Recent exacerbations / new problems:  Yeast infections almost completely resolved. Last office dictation reviewed: yes        type 2 DM under stable but not quite at goal control as evidenced by A1C of 7.5 via POC on 2/12/2020     Plan     Physician Follow-up:  Every 3 months     Medication Management Follow-up:   Diabetes Service   Continue Insulin glargine (Lantus) at 48 Units subQ each evening. Patient to call with any blood sugar below 80 or above 250. Patient has had some elevated glucose levels but also was off insulin while ill. Will see how blood glucose is when patient is well, eats as she should and takes her insulin. Continue Dulaglutide (Trulicity) 7.9TA subQ once weekly on Wednesdays  -Metformin XR 1000mg in the morning and 1000 mg XR in the evening    Check blood sugars three times daily and document in sugar log. Dong down if blood sugars before meal or postprandial.  Also, if blood sugar is below 70 or above 200 try to write why. Patient to resume exercise using stationary bike.     Patient to call for eye exam.    Electronically signed by Freddie Sanches RP on 9/9/2020 at 3:53 PM     CLINICAL PHARMACY CONSULT: MED RECONCILIATION/REVIEW ADDENDUM    CLINICAL PHARMACY CONSULT: MED RECONCILIATION/REVIEW ADDENDUM    For Pharmacy Admin Tracking Only    PHSO: No  Total # of Interventions Recommended: 5  - Increased Dose #: 1  - Refills Provided #: 4  Total Interventions Accepted: 5  Time Spent (min): 60    Lauryl R Myron, PharmD  [address]

## 2020-09-10 NOTE — TELEPHONE ENCOUNTER
RX for pen needles 32G / 4 mm called to [address] [place] [address] [address] with 3 refills.

## 2020-09-10 NOTE — TELEPHONE ENCOUNTER
RX phoned in for Lancets #200 with 3 refills, metformin #120 with 3 refills and Accuchek Shelbi plus glucose test strips #200 with 3 refills. Satish Sanches,Pharm. D,, [facility]  9/9/2020  10:35 PM

## 2020-09-23 LAB — HBA1C MFR BLD: 7.4 %

## 2020-10-06 ENCOUNTER — TELEPHONE (OUTPATIENT)
Dept: PHARMACY | Age: 65
End: 2020-10-06

## 2020-10-06 NOTE — TELEPHONE ENCOUNTER
Attempted to call patient for COVID 19 screening secondary to upcoming appointment tomorrow . At this time patient is unavailable with either no answer or no voicemail available. Patient will report to [place] at scheduled time. Patient will be screened at entry to hospital for fever. Will coordinate for next appointment accordingly.    Lu Howard, PharmD, 10/6/2020 4:42 PM

## 2020-10-07 ENCOUNTER — HOSPITAL ENCOUNTER (OUTPATIENT)
Dept: PHARMACY | Age: 65
Setting detail: THERAPIES SERIES
Discharge: HOME | End: 2020-10-07
Payer: COMMERCIAL

## 2020-10-07 VITALS — BODY MASS INDEX: 36.06 KG/M2 | TEMPERATURE: 96.9 F | WEIGHT: 216.7 LBS

## 2020-10-07 PROCEDURE — 99213 OFFICE O/P EST LOW 20 MIN: CPT

## 2020-10-07 RX ORDER — INSULIN GLARGINE 100 [IU]/ML
48 INJECTION, SOLUTION SUBCUTANEOUS NIGHTLY
Qty: 5 PEN | Refills: 3 | OUTPATIENT
Start: 2020-10-07 | End: 2021-11-17 | Stop reason: SDUPTHER

## 2020-10-07 NOTE — PROGRESS NOTES
Diabetic Medication Management   [address] [place], [address] [facility]  Phone: [phone]  Fax: [phone]    NAME: Natty [facility] RECORD NUMBER:  [id number]  AGE: 62 y.o. GENDER: female  : 1955  EPISODE DATE:  10/7/2020       Ms. Carlito Brand was referred to [place] [facility] by Dr. Latasha Ram, Special instructions include: follow up every 3 months with physician in office     Goals per referral:   Fasting blood glucose:   Peak postprandial glucose: < 180  A1C: < 7%    Other goals:  Blood pressure goal: 130/80    Weight loss goal (~10%): Target weight  200 to be reached by date: 2020 (3-6 months)  Physical Activity goal:    15 minutes 5 times per week to be reached by date: 2020 (3-6 months)  Smoking Cessation   Quit Date: never smoked  Cholesterol at target:Yes    Date: 2020  Annual eye exam:    Date: 2019 - needs appt  Comprehensive annual foot exam:   Date: 19    Annual urine Albumin and serum creatinine:   Date: 2020    Patient Specific Goals:  1. Lower blood sugars  2. Lower A1C    Subjective   Ms. Carlito Brand is a 62 y.o. female here for the Diabetes Service for self-management education, medication review including over the counter medications and herbal products, overall wellbeing assessment, transition of care and any needed adjustments with updates and recommendations communicated to the referring physician. Patient's name and  verified. Patient visit in person in office. Patient Findings:    Patient reports stress due to having to get paperwork together for \"PIPP\" program which helps with gas and electricity bills. She states that she needs this program to afford bills over winter. Offered to help with any copies or faxing patient needs. Patient has had chronic issues with gagging but seems to be more lately. Seems to happen frequently after taking her metformin.    Patient reports taking metformin with food but states she often waits up to an hours after eating to take. Encouraged her to take more with food rather than waiting till after. Needs to go to store to get more vegetables. Patient has been trying to walk more as feels she has been sitting more and getting fatigued and worn out. Patient states this is not new and that she has been getting more worn out and not able to walk as far easily for several years. Walking about 2-3 days a week. Still has exercise bike and it is her kitchen. Cleaning out bedroom and states all the stuff from her room is in kitchen so no room to exercise. Patient reports no missed doses of insulin. States her alarm is helping he quite a bit. Taking 48 units of Lantus (insulin glargine)      Patient saw endocrinologist in Sept and reports A1C of 7.4 (9/23). Dr. Gosia Torres was pleased per patient but encouraged patent to stay away from startchs. States she has been trying to eat more lean meats and veges and states blood sugars were running well. Patient states blood sugars have been higher the last few days but admits to running out of veges. Says went to store recently and bought $100 groceries and doesn't seem to last very long. Discussed having frozen veges at home for when fresh unavailable or run out. States two episodes of hypoglycemia since last visit. Patient thinks this occurred when was busy cleaning and missed a meal. Discussed importance of not missing/skipping meals. No change in color or size of growth on uvula. Did see PCP and they are just watching as no change. aPatient encouraged to call her eye doctor to make an appt as due for visit and has not done since last appt. Patient has enough testing supplies and does not need any refills. Patient needs refill on Lantus (insulin glargine)  Sent to [employer].      Next appt with Dr. Gosia Torres 12/28 at 11:30am.    []  Missed doses   [] Emergency Room Visit    []  Medication changes  []  Hospitalization   []  Diet changes   []  Acute illness   []  Activity changes      Symptoms of hypoglycemia -     []  None   []  Shakiness    [x]  Lightheadedness or dizziness   []  Confusion      []  Sweating   []  Other       Symptoms of hyperglycemia   [x]  None   []  Frequent urination    []  Increased thirst   []  Other    Medication adverse reactions   []  None   []  Diarrhea / Nausea / Vomiting / Constipation / flatulence   []  Hypertension   []  Peripheral edema     []  Signs of infection   []  Headache   []  Vision changes   []  Increased cholesterol    []  Weight gain   []  Change in renal function   []  Increased potassium  [x]  Other - possibly due to metformin   Comments:      If recent hospital admission / ED visit, was this related to Diabetes No:hypotension Discharge date July 2020    Objective     PMHx:    Past Medical History:   Diagnosis Date    Anxiety     Asthma     Common cold     [facility]) [facility]     Depression     Diabetes mellitus ([facility]     Emphysema     Fatty liver     H/O: pneumonia FEB. 2014    Hiatal hernia     Hyperlipidemia     Hypertension     Mitral valve prolapse     Psychosis (HCC)     SOB (shortness of breath) on exertion     Spinal headache     Stroke ([facility] 2013    Mild Lt side    Upper respiratory symptom        Social History:    Social History     Tobacco Use    Smoking status: Passive Smoke Exposure - Never Smoker    Smokeless tobacco: Never Used    Tobacco comment: socially smoked when playing cards   Substance Use Topics    Alcohol use: No     Alcohol/week: 0.0 standard drinks     Comment: socially       Pertinent Labs:    Lab Results   Component Value Date    LABA1C 7.4 09/23/2020    LABA1C 7.5 06/29/2020    LABA1C 7.5 02/12/2020     Lab Results   Component Value Date    CHOL 145 02/12/2020    TRIG 158 02/12/2020    HDL 54 02/12/2020    LDLCALC 59 02/12/2020     Lab Results   Component Value Date    CREATININE 1.20 (H) 07/25/2020    BUN 30 (H) 07/25/2020     07/25/2020    K 4.5 07/25/2020     07/25/2020    CO2 25 07/25/2020     Lab Results   Component Value Date    ALT 26 07/25/2020       Weight:  Wt Readings from Last 3 Encounters:   10/07/20 216 lb 11.2 oz (98.3 kg)   09/09/20 217 lb 4.8 oz (98.6 kg)   08/19/20 219 lb 11.2 oz (99.7 kg)       Current medications:  Prior to Admission medications    Medication Sig Start Date End Date Taking? Authorizing Provider   blood glucose monitor strips Test 3-4 times a day & as needed for symptoms of irregular blood glucose. Dispense sufficient amount for indicated testing frequency plus additional to accommodate PRN testing needs. 9/9/20  Yes Barbara Santana   albuterol sulfate  (90 Base) MCG/ACT inhaler Inhale 2 puffs into the lungs every 6 hours as needed for Wheezing   Yes Historical Provider, MD   aspirin 81 MG chewable tablet Take 81 mg by mouth   Yes Historical Provider, MD   budesonide-formoterol (SYMBICORT) 160-4.5 MCG/ACT AERO 2 puffs twice a day 5/1/18  Yes Historical Provider, MD   brexpiprazole (REXULTI) 4 MG TABS tablet Take 2 mg by mouth daily    Yes Historical Provider, MD   albuterol (PROVENTIL) (2.5 MG/3ML) 0.083% nebulizer solution Take 2.5 mg by nebulization every 6 hours as needed for Wheezing. Yes Historical Provider, MD   Insulin Pen Needle 32G X 4 MM MISC Use to inject Trulicity once a week on Wednesdays and Lantus once a day. 9/9/20   Danni Saavedra   metFORMIN (GLUCOPHAGE XR) 500 MG extended release tablet Take 2 tablets by mouth 2 times daily (with meals) 9/9/20   Danni Saavedra   ONE TOUCH ULTRASOFT LANCETS MISC Use to test blood glucose 3-4 times daily and as needed for symptoms of high or low blood glucose.  9/9/20   Danni Saavedra   insulin glargine (LANTUS SOLOSTAR) 100 UNIT/ML injection pen Inject 45 Units into the skin nightly  Patient taking differently: Inject 48 Units into the skin nightly  8/19/20   Percell Splinter   Melatonin 5 MG CAPS Take 1 capsule by mouth nightly as needed (sleep)    Historical Provider, MD   tiotropium (SPIRIVA RESPIMAT) 2.5 MCG/ACT AERS inhaler Inhale 2 puffs into the lungs daily    Historical Provider, MD   escitalopram (LEXAPRO) 20 MG tablet Take 20 mg by mouth daily     Historical Provider, MD   metoprolol succinate (TOPROL XL) 50 MG extended release tablet Take 50 mg by mouth daily  1/12/18   Historical Provider, MD   Dulaglutide (TRULICITY) 1.5 YC/0.9ZR SOPN Inject 1.5 mg into the skin once a week Takes on WEd 1/12/18   Historical Provider, MD   Cholecalciferol (VITAMIN D3) 2000 units CAPS Take by mouth daily    Historical Provider, MD   FLUARIX QUADRIVALENT 0.5 ML injection inject 0.5 milliliter intramuscularly 8/4/17   Historical Provider, MD   losartan (COZAAR) 50 MG tablet Take 50 mg by mouth daily  10/25/17   Historical Provider, MD   rosuvastatin (CRESTOR) 40 MG tablet Take 40 mg by mouth every evening    Historical Provider, MD   montelukast (SINGULAIR) 10 MG tablet Take 10 mg by mouth nightly    Historical Provider, MD   pantoprazole (PROTONIX) 40 MG tablet Take 1 tablet by mouth daily 7/20/16   Jimi Doherty PA-C   loratadine (CLARITIN) 10 MG tablet Take 10 mg by mouth daily as needed (allergies)     Historical Provider, MD       Immunizations:   Most Recent Immunizations   Administered Date(s) Administered    Influenza Virus Vaccine 11/20/2015    Pneumococcal Conjugate 13-valent (Reuben Oliveira) 11/20/2015       Home Blood Glucose Results:     Blood glucose trends noted:      Date  Fasting   Before lunch   Before dinner  Bedtime  10/7  184       196  10/6  207       10/5        248*  10/4  159   144       197(pizza before)   10/3  291   218       298(pizza before)  10/2  135          259  10/1  --   101       107  9/30  156      228    90  9/29  111   113       118  - ran out of Erydel she thinks  9/28  112   112    9/27  120   102   128   9/26  100      107    155  9/25  161   126   90   9/24  101   119       176  9/23  128   192   64 (skipped lunch)               Patient admits to being very noncompliant about eating with any regularity. Patient admits to skipping /missing meals. Discussed with patient to plan ahead and she will try to eat more regularly scheduled meals along with trying to not skip meals. Patient also says she plans to pack a bag with veges and peanut butter to have to eat if she is not home so she does not skip meals. Assessment     A1c at goal: No: but decreasing 7.4 on 9/23/2020 by POC A1C in PCP office. Blood Pressure at goal: Yes 116/58 on 7/25/20  Weight at goal: No   Physical activity: nothing consistent  Physical activity at goal: no  Smoking Cessation: Yes  Cholesterol at target: Yes: LDL 59  Annual eye exam completed: Due for recheck  Comprehensive Foot Exam Completed: Yes  Annual urine albumin and serum creatinine: Yes    Statin: Yes    Appropriate?: Yes  Changes made:     ACE/ARB: Yes  Appropriate?: Yes  Changes made:     Aspirin: Yes  Appropriate?: Yes  Changes made:     Eating patterns:    []  My plate    []  Mediterranean diet   []  Low sodium   []  DASH diet   []  Portion control   []  Reduced calorie    []  Fast food / Restaurant  []  High glycemic index foods   []  Sugary beverages   [x]  Other     Comment: see above    Current Medications Affecting Diabetes:  Dulaglutide 1.5mg subQ once weekly on Wednesdays  Insulin glargine 48 units subQ each evening  Metformin XR 1000mg in the morning and 1000 mg XR in the evening       Compliant: Yes  Barriers to medication therapy: No alarm on phone has helped patient a lot. Medications attempted in the past:  Metformin about 3 years ago with some diarrhea. Unsure if regular or extended release. Tolerating ER well.     Recent exacerbations / new problems:       Last office dictation reviewed: yes        type 2 DM under stable but not quite at goal control as evidenced by A1C of 7.4 via POC on 9/23/20     Plan     Physician Follow-up:  Every 3 months     Medication Management Follow-up:   Diabetes Service   Continue Insulin glargine (Lantus) at 48 Units subQ each evening. Blood sugars are all over the place but most recent A1c is almost within range. Fluctuations in blood sugar appear to be due to patients variable meals. Discussed diet at length with patient and will see if this improves glucose control. If not may have to increase dose of insulin but concern over hypoglycemia if patient skips a meal.     Continue Dulaglutide (Trulicity) 2.9ZQ subQ once weekly on Wednesdays  -Metformin XR 1000mg in the morning and 1000 mg XR in the evening    Check blood sugars three times daily and document in sugar log. Dong down if blood sugars before meal or postprandial.  Also, if blood sugar is below 70 or above 200 try to write why. Bring in glucose log to every appt. Try to walk 4 days of the week and gradually increase your time. Try to get your exercise bike ready to use once weather turns cold.      Patient to call for eye exam.    Electronically signed by Renata Sanches RPH on 10/7/2020 at 2:45 PM

## 2020-10-07 NOTE — PROGRESS NOTES
CLINICAL PHARMACY CONSULT: MED RECONCILIATION/REVIEW ADDENDUM    For Pharmacy Admin Tracking Only    PHSO: No  Total # of Interventions Recommended: 1  - Refills Provided #: 1  - Maintenance Safety Lab Monitoring #: 1  Total Interventions Accepted: 1  Time Spent (min): 45    Satish Sanches, PharmD  [address]

## 2020-10-20 ENCOUNTER — TELEPHONE (OUTPATIENT)
Dept: PHARMACY | Age: 65
End: 2020-10-20

## 2020-10-20 NOTE — TELEPHONE ENCOUNTER
Spoke with patient for COVID 19 screening secondary to upcoming appointment tomorrow . At this time patient denies symptoms, recent (previous 14 days) positive covid test, recent travel and exposure. Patient will report to [facility] at scheduled time. Patient educated to call physician or go to ER with respiratory symptoms or fever and to not present to appointment if symptomatic. Will coordinate for next appointment accordingly.

## 2020-10-21 ENCOUNTER — HOSPITAL ENCOUNTER (OUTPATIENT)
Dept: PHARMACY | Age: 65
Setting detail: THERAPIES SERIES
Discharge: HOME | End: 2020-10-21
Payer: COMMERCIAL

## 2020-10-21 VITALS — BODY MASS INDEX: 35.53 KG/M2 | TEMPERATURE: 96.2 F | WEIGHT: 213.5 LBS

## 2020-10-21 PROCEDURE — 99213 OFFICE O/P EST LOW 20 MIN: CPT

## 2020-10-21 RX ORDER — LANCETS 30 GAUGE
EACH MISCELLANEOUS
Qty: 200 EACH | Refills: 3 | OUTPATIENT
Start: 2020-10-21 | End: 2022-08-24 | Stop reason: SDUPTHER

## 2020-10-21 NOTE — PROGRESS NOTES
goes and it is too heavy to carry when she walks. Waiting for family member to take her to store. She is hoping to go today or later this week. Suggested patient make smaller more frequent trips to the store to allow her to walk. Encouraged her to get lean meats and veges. Reports she did get some frozen veges after last appt but has used them up. Encouraged her to get some more. Patient reports continues gagging but happening with inhalers this. Sometimes more and sometimes better. Sometimes when she is rushing around or more anxious. Patient did not mention any issue with metformin. Has been taking metformin with food which has helped with taste. Patient has been walking more. Walking more to local stores. Trying to walk most days of the week but for no specific duration. States she thinks of it more as a destination rather than an amount of time or distance. States does get SOB and is using her inhalers. Has albuterol with her and is able to demonstrate/explain proper use. Exercise bike remains in kitchen but still surrounded by boxes/laundry. Will try to clear it out to allow her to exercise when it gets colder and not able to walk outside. Patient reports no missed doses of any medications including insulin glargine, metformin, or Trulicity. Only one episode of hypoglycemia since last visit. Per patient her glyburide was still in her pill svetlana and she accidentally took it. States it has now been taken out of her pill svetlana and therefore she is no longer at risk for taking. When patient took it she had blood sugar of 55. States she ate and it went up but not noted in her glucose log. Encouraged her to always log her repeat blood sugars if she experiences hypoglycemia. Patient encouraged to call her eye doctor to make an appt as due for visit and has still not done since last appt.         Patient has enough testing supplies other than lancets and does not need any refills on medications. Refill on Lancets sent to [facility] on [address]. Next appt with Dr. Guy Matthew 12/28 at 11:30am.    []  Missed doses   []  Emergency Room Visit    []  Medication changes  []  Hospitalization   []  Diet changes   []  Acute illness   []  Activity changes      Symptoms of hypoglycemia -     []  None   []  Shakiness    [x]  Lightheadedness or dizziness   []  Confusion      []  Sweating   []  Other       Symptoms of hyperglycemia   [x]  None   []  Frequent urination    []  Increased thirst   []  Other    Medication adverse reactions   []  None   []  Diarrhea / Nausea / Vomiting / Constipation / flatulence   []  Hypertension   []  Peripheral edema     []  Signs of infection   []  Headache   []  Vision changes   []  Increased cholesterol    []  Weight gain   []  Change in renal function   []  Increased potassium  []  Other  Comments:      If recent hospital admission / ED visit, was this related to Diabetes No:hypotension Discharge date July 2020    Objective     PMHx:    Past Medical History:   Diagnosis Date    Anxiety     Asthma     Common cold     [facility] [address]     Depression     Diabetes mellitus ([facility] [address] [facility]     Emphysema     Fatty liver     H/O: pneumonia FEB. 2014    Hiatal hernia     Hyperlipidemia     Hypertension     Mitral valve prolapse     Psychosis (HCC)     SOB (shortness of breath) on exertion     Spinal headache     Stroke ([facility] [address] 2013    Mild Lt side    Upper respiratory symptom        Social History:    Social History     Tobacco Use    Smoking status: Passive Smoke Exposure - Never Smoker    Smokeless tobacco: Never Used    Tobacco comment: socially smoked when playing cards   Substance Use Topics    Alcohol use: No     Alcohol/week: 0.0 standard drinks     Comment: socially       Pertinent Labs:    Lab Results   Component Value Date    LABA1C 7.4 09/23/2020    LABA1C 7.5 06/29/2020    LABA1C 7.5 02/12/2020     Lab Results   Component Value Date    CHOL 145 02/12/2020    TRIG 158 02/12/2020    HDL 54 02/12/2020    LDLCALC 59 02/12/2020     Lab Results   Component Value Date    CREATININE 1.20 (H) 07/25/2020    BUN 30 (H) 07/25/2020     07/25/2020    K 4.5 07/25/2020     07/25/2020    CO2 25 07/25/2020     Lab Results   Component Value Date    ALT 26 07/25/2020       Weight:  Wt Readings from Last 3 Encounters:   10/21/20 213 lb 8 oz (96.8 kg)   10/07/20 216 lb 11.2 oz (98.3 kg)   09/09/20 217 lb 4.8 oz (98.6 kg)       Current medications:  Prior to Admission medications    Medication Sig Start Date End Date Taking? Authorizing Provider   insulin glargine (LANTUS SOLOSTAR) 100 UNIT/ML injection pen Inject 48 Units into the skin nightly 10/7/20  Yes Jeny Santana   Insulin Pen Needle 32G X 4 MM MISC Use to inject Trulicity once a week on Wednesdays and Lantus once a day. 9/9/20  Yes Jeny Santana   metFORMIN (GLUCOPHAGE XR) 500 MG extended release tablet Take 2 tablets by mouth 2 times daily (with meals) 9/9/20  Yes Shubham Avalos   blood glucose monitor strips Test 3-4 times a day & as needed for symptoms of irregular blood glucose. Dispense sufficient amount for indicated testing frequency plus additional to accommodate PRN testing needs. 9/9/20  Yes Jeny Santana   ONE TOUCH ULTRASOFT LANCETS MISC Use to test blood glucose 3-4 times daily and as needed for symptoms of high or low blood glucose.  9/9/20  Yes Jeny Santana   albuterol sulfate  (90 Base) MCG/ACT inhaler Inhale 2 puffs into the lungs every 6 hours as needed for Wheezing   Yes Historical Provider, MD   Melatonin 5 MG CAPS Take 1 capsule by mouth nightly as needed (sleep)   Yes Historical Provider, MD   tiotropium (SPIRIVA RESPIMAT) 2.5 MCG/ACT AERS inhaler Inhale 2 puffs into the lungs daily   Yes Historical Provider, MD   escitalopram (LEXAPRO) 20 MG tablet Take 20 mg by mouth daily    Yes Historical Provider, MD   aspirin 81 MG chewable tablet Take 81 mg by mouth   Yes Historical Provider, MD   metoprolol succinate (TOPROL XL) 50 MG extended release tablet Take 50 mg by mouth daily  1/12/18  Yes Historical Provider, MD   Dulaglutide (TRULICITY) 1.5 DS/2.2EC SOPN Inject 1.5 mg into the skin once a week Takes on WEd 1/12/18  Yes Historical Provider, MD   budesonide-formoterol (SYMBICORT) 160-4.5 MCG/ACT AERO 2 puffs twice a day 5/1/18  Yes Historical Provider, MD   Cholecalciferol (VITAMIN D3) 2000 units CAPS Take by mouth daily   Yes Historical Provider, MD   losartan (COZAAR) 50 MG tablet Take 50 mg by mouth daily  10/25/17  Yes Historical Provider, MD   brexpiprazole (REXULTI) 4 MG TABS tablet Take 2 mg by mouth daily    Yes Historical Provider, MD   rosuvastatin (CRESTOR) 40 MG tablet Take 40 mg by mouth every evening   Yes Historical Provider, MD   montelukast (SINGULAIR) 10 MG tablet Take 10 mg by mouth nightly   Yes Historical Provider, MD   pantoprazole (PROTONIX) 40 MG tablet Take 1 tablet by mouth daily 7/20/16  Yes Oscar Lambert PA-C   loratadine (CLARITIN) 10 MG tablet Take 10 mg by mouth daily as needed (allergies)    Yes Historical Provider, MD   albuterol (PROVENTIL) (2.5 MG/3ML) 0.083% nebulizer solution Take 2.5 mg by nebulization every 6 hours as needed for Wheezing.    Yes Historical Provider, MD   FLUARIX QUADRIVALENT 0.5 ML injection inject 0.5 milliliter intramuscularly 8/4/17   Historical Provider, MD       Immunizations:   Most Recent Immunizations   Administered Date(s) Administered    Influenza Virus Vaccine 11/20/2015    Pneumococcal Conjugate 13-valent (Genette Guise) 11/20/2015       Home Blood Glucose Results:     Blood glucose trends noted:      Date  Fasting   Before lunch   Before dinner  Bedtime  10/21  274  10/20  166   101   126  10/19  104   --   154  10/18  169   89   107  10/17  107   182   215  10/16  117   117   --  10/15  183   358   --  10/14  194   --   --  10/13  119   --   105    55 (patient accidentally took                      Glyburide)         Patient admits to being continuing to be very noncompliant about eating with any regularity. Discussed eating meatloaf pie along with mashed potatoes or three pieces of toast with breakfast. Patient has not been skipping meals which is an improvement but needs to watch carbohydrate intake. Discussed making a regular appt (weekly) to go shopping with family members to keep food options available in the home. Patient will try to see if she can go shopping every Sunday. Assessment     A1c at goal: No: but decreasing 7.4 on 9/23/2020 by POC A1C in PCP office. Blood Pressure at goal: Yes 116/58 on 7/25/20  Weight at goal: No   Physical activity: nothing consistent  Physical activity at goal: no  Smoking Cessation: Yes  Cholesterol at target: Yes: LDL 59  Annual eye exam completed: Due for recheck - will make appt  Comprehensive Foot Exam Completed: Yes  Annual urine albumin and serum creatinine: Yes    Statin: Yes    Appropriate?: Yes  Changes made:     ACE/ARB: Yes  Appropriate?: Yes  Changes made:     Aspirin: Yes  Appropriate?: Yes  Changes made:     Eating patterns:    []  My plate    []  Mediterranean diet   []  Low sodium   []  DASH diet   []  Portion control   []  Reduced calorie    []  Fast food / Restaurant  []  High glycemic index foods   []  Sugary beverages   [x]  Other     Comment: see above    Current Medications Affecting Diabetes:  Dulaglutide 1.5mg subQ once weekly on Wednesdays  Insulin glargine 48 units subQ each evening  Metformin XR 1000mg in the morning and 1000 mg XR in the evening       Compliant: Yes  Barriers to medication therapy: No alarm on phone has helped patient a lot.     Medications attempted in the past:  Metformin about 3 years ago with some diarrhea. Unsure if regular or extended release. Tolerating ER well. Recent exacerbations / new problems:       Last office dictation reviewed: yes        type 2 DM under stable but not quite at goal control as evidenced by A1C of 7.4 via POC on 9/23/20     Plan     Physician Follow-up:  Every 3 months     Medication Management Follow-up:   Diabetes Service   Increase Insulin glargine (Lantus) to 50 Units subQ each evening. Blood sugars are all over the place but most recent A1c is almost within range. Fluctuations in blood sugar appear to be due to patients variable meals. Discussed diet at length with patient and will see if this improves glucose control but will also increase insulin dose slightly to help lower blood glucose. Patient to call with any hypoglycemic episodes. Continue Dulaglutide (Trulicity) 5.3YU subQ once weekly on Wednesdays  -Metformin XR 1000mg in the morning and 1000 mg XR in the evening    Check blood sugars three times daily and document in sugar log. Dong down if blood sugars before meal or postprandial.  Also, if blood sugar is below 70 or above 200 try to write why. Bring in glucose log to every appt. Try to get your exercise bike ready to use once weather turns cold.      Patient to call for eye exam.    Electronically signed by Erinn Sanches RPH on 10/21/2020 at 9:50 AM       CLINICAL PHARMACY CONSULT: MED RECONCILIATION/REVIEW San[facility]  22. Tracking Only    PHSO: No  Total # of Interventions Recommended: 2  - Increased Dose #: 1  - Refills Provided #: 1  Total Interventions Accepted: 2  Time Spent (min): 45    Satish Sanches, PharmD  [address]

## 2020-11-11 ENCOUNTER — TELEPHONE (OUTPATIENT)
Dept: PHARMACY | Age: 65
End: 2020-11-11

## 2020-11-11 NOTE — TELEPHONE ENCOUNTER
Patient called to inform us her blood sugars have been high over last 5 days as she was on a burst of steroids - prednisone 20mg twice a day 11/5 thru 11/10. Patient finished yesterday and states blood sugar this am was a bit better at 188 than has been last few days. See below. Patient admits she has not been checking blood sugar as often as she should. Encouraged three times a day. Patient has not missed any doses and states her blood sugars were well controlled 10/28 thru 11/4 with numbers running in low 100's with lowest being 88. Encouraged patient to continue current medications and that blood sugars should come done. Elevation last few days likely due to her steroid burst.  Patient states she understands. Will keep follow up appt with us in office on 11/17. Satish Sanches,Pharm. D,, BCPS, CACP  11/11/2020  10:54 AM

## 2020-11-16 ENCOUNTER — TELEPHONE (OUTPATIENT)
Dept: PHARMACY | Age: 65
End: 2020-11-16

## 2020-11-16 NOTE — TELEPHONE ENCOUNTER
Spoke with patient for COVID 19 screening secondary to upcoming appointment tomorrow . At this time patient denies symptoms, recent (previous 14 days) positive covid test, recent travel and exposure. Patient will report to [facility] at scheduled time. Patient educated to call physician or go to ER with respiratory symptoms or fever and to not present to appointment if symptomatic. Will coordinate for next appointment accordingly.      Jolanta Anderson, Pharm D, [facility]  [facility]  11/16/2020 5:09 PM

## 2020-11-17 ENCOUNTER — HOSPITAL ENCOUNTER (OUTPATIENT)
Dept: PHARMACY | Age: 65
Setting detail: THERAPIES SERIES
Discharge: HOME | End: 2020-11-17
Payer: COMMERCIAL

## 2020-11-17 VITALS — TEMPERATURE: 97.6 F | WEIGHT: 211.2 LBS | BODY MASS INDEX: 35.15 KG/M2

## 2020-11-17 PROCEDURE — 99212 OFFICE O/P EST SF 10 MIN: CPT

## 2020-11-17 NOTE — PROGRESS NOTES
Diabetic Medication Management   [facility] [address]    [address] [place], [address] [facility]  Phone: [phone]  Fax: [phone]    NAME: Natty [facility] RECORD NUMBER:  [id number]  AGE: 62 y.o. GENDER: female  : 1955  EPISODE DATE:  2020       Ms. Pennie Chaves was referred to [address] [place] [address] Medication Management Services by Dr. Graciela Barber, Special instructions include: follow up every 3 months with physician in office     Goals per referral:   Fasting blood glucose:   Peak postprandial glucose: < 180  A1C: < 7%    Other goals:  Blood pressure goal: 130/80    Weight loss goal (~10%): Target weight  200 to be reached by date: 2020 (3-6 months)  Physical Activity goal:    15 minutes 5 times per week to be reached by date: 2020 (3-6 months)  Smoking Cessation   Quit Date: never smoked  Cholesterol at target:Yes    Date: 2020  Annual eye exam:    Date: Dec 9th, 2020  Comprehensive annual foot exam:   Date: 19    Annual urine Albumin and serum creatinine:   Date: 2020    Patient Specific Goals:  1. Lower blood sugars  2. Lower A1C    Subjective   Ms. Pennie Chaves is a 62 y.o. female here for the Diabetes Service for self-management education, medication review including over the counter medications and herbal products, overall wellbeing assessment, transition of care and any needed adjustments with updates and recommendations communicated to the referring physician. Patient's name and  verified. Patient visit in person in office. Patient Findings:    Patient did have to take 5 day course of prednisone  thru  and saw significant increase in blood sugars (see below). States she was concerned about increase but has reduced a bit since ending. Still running higher than was prior to prednisone. Patient has been going to store regularly lately and eating more salads. Has more fresh foods lately. Plans to make chili and lean meats.   Patient Anxiety     Asthma     Common cold     COPD (chronic obstructive pulmonary disease) (HCC)     Depression     Diabetes mellitus (HCC)     Emphysema     Fatty liver     H/O: pneumonia FEB. 2014    Hiatal hernia     Hyperlipidemia     Hypertension     Mitral valve prolapse     Psychosis (HCC)     SOB (shortness of breath) on exertion     Spinal headache     Stroke ([facility] [address] 2013    Mild Lt side    Upper respiratory symptom        Social History:    Social History     Tobacco Use    Smoking status: Passive Smoke Exposure - Never Smoker    Smokeless tobacco: Never Used    Tobacco comment: socially smoked when playing cards   Substance Use Topics    Alcohol use: No     Alcohol/week: 0.0 standard drinks     Comment: socially       Pertinent Labs:    Lab Results   Component Value Date    LABA1C 7.4 09/23/2020    LABA1C 7.5 06/29/2020    LABA1C 7.5 02/12/2020     Lab Results   Component Value Date    CHOL 145 02/12/2020    TRIG 158 02/12/2020    HDL 54 02/12/2020    LDLCALC 59 02/12/2020     Lab Results   Component Value Date    CREATININE 1.20 (H) 07/25/2020    BUN 30 (H) 07/25/2020     07/25/2020    K 4.5 07/25/2020     07/25/2020    CO2 25 07/25/2020     Lab Results   Component Value Date    ALT 26 07/25/2020       Weight:  Wt Readings from Last 3 Encounters:   11/17/20 211 lb 3.2 oz (95.8 kg)   10/21/20 213 lb 8 oz (96.8 kg)   10/07/20 216 lb 11.2 oz (98.3 kg)       Current medications:  Prior to Admission medications    Medication Sig Start Date End Date Taking? Authorizing Provider   Lancets MISC Use to check blood sugar 3-4 times daily (before meals and at bedtime) and as needed for symptoms.  10/21/20   Comal [place]   insulin glargine (LANTUS SOLOSTAR) 100 UNIT/ML injection pen Inject 48 Units into the skin nightly  Patient taking differently: Inject 50 Units into the skin nightly  10/7/20   Comal [place]   Insulin Pen Needle 32G X 4 MM MISC Use to inject Trulicity once a week on Wednesdays and Lantus once a day. 9/9/20   Kyle Bhardwaj   metFORMIN (GLUCOPHAGE XR) 500 MG extended release tablet Take 2 tablets by mouth 2 times daily (with meals) 9/9/20   Kyle Bhardwaj   blood glucose monitor strips Test 3-4 times a day & as needed for symptoms of irregular blood glucose. Dispense sufficient amount for indicated testing frequency plus additional to accommodate PRN testing needs.  9/9/20   Chinedu Santana   albuterol sulfate  (90 Base) MCG/ACT inhaler Inhale 2 puffs into the lungs every 6 hours as needed for Wheezing    Historical Provider, MD   Melatonin 5 MG CAPS Take 1 capsule by mouth nightly as needed (sleep)    Historical Provider, MD   tiotropium (SPIRIVA RESPIMAT) 2.5 MCG/ACT AERS inhaler Inhale 2 puffs into the lungs daily    Historical Provider, MD   escitalopram (LEXAPRO) 20 MG tablet Take 20 mg by mouth daily     Historical Provider, MD   aspirin 81 MG chewable tablet Take 81 mg by mouth    Historical Provider, MD   metoprolol succinate (TOPROL XL) 50 MG extended release tablet Take 50 mg by mouth daily  1/12/18   Historical Provider, MD   Dulaglutide (TRULICITY) 1.5 BE/4.1DG SOPN Inject 1.5 mg into the skin once a week Takes on WEd 1/12/18   Historical Provider, MD   budesonide-formoterol (SYMBICORT) 160-4.5 MCG/ACT AERO 2 puffs twice a day 5/1/18   Historical Provider, MD   Cholecalciferol (VITAMIN D3) 2000 units CAPS Take by mouth daily    Historical Provider, MD   FLUARIX QUADRIVALENT 0.5 ML injection inject 0.5 milliliter intramuscularly 8/4/17   Historical Provider, MD   losartan (COZAAR) 50 MG tablet Take 50 mg by mouth daily  10/25/17   Historical Provider, MD   brexpiprazole (REXULTI) 4 MG TABS tablet Take 2 mg by mouth daily     Historical Provider, MD   rosuvastatin (CRESTOR) 40 MG tablet Take 40 mg by mouth every evening    Historical Provider, MD   montelukast (SINGULAIR) 10 MG tablet Take 10 mg by mouth Yes    Statin: Yes    Appropriate?: Yes  Changes made:     ACE/ARB: Yes  Appropriate?: Yes  Changes made:     Aspirin: Yes  Appropriate?: Yes  Changes made:     Eating patterns:    []  My plate    []  Mediterranean diet   []  Low sodium   []  DASH diet   []  Portion control   []  Reduced calorie    []  Fast food / Restaurant  []  High glycemic index foods   []  Sugary beverages   [x]  Other     Comment: see above    Current Medications Affecting Diabetes:  Dulaglutide 1.5mg subQ once weekly on Wednesdays  Insulin glargine 50 units subQ each evening  Metformin XR 1000mg in the morning and 1000 mg XR in the evening       Compliant: Yes  Barriers to medication therapy: No alarm on phone has helped patient a lot. Medications attempted in the past:  Metformin about 3 years ago with some diarrhea. Unsure if regular or extended release. Tolerating ER well. Recent exacerbations / new problems:       Last office dictation reviewed: yes        type 2 DM under stable but not quite at goal control as evidenced by A1C of 7.4 via POC on 9/23/20     Plan     Physician Follow-up:  Every 3 months     Medication Management Follow-up:   Diabetes Service   Continue Insulin glargine (Lantus) to 50 Units subQ each evening. Blood sugars are elevated above goal ranges but trending back toward goals very slowly. Patient was well controlled prior to prednisone. Hesitant to increase insulin right now for fear of resulting in hypoglycemia. Will continue current dosing right now and check with patient in a week. If blood sugars still elevated patient asked to contact us and we will increase dosing. Continue Dulaglutide (Trulicity) 8.7PI subQ once weekly on Wednesdays  -Metformin XR 1000mg in the morning and 1000 mg XR in the evening    Check blood sugars three times daily and document in sugar log. Patient to reece down whenever she eats something she feels is a treat or splurg.   Encouraged her to write down what happened the meal prior for any blood sugars below 70 or above 20. Bring in glucose log to every appt. Try to get your exercise bike ready to use.       Electronically signed by Malena Rodriguez RPH on 11/17/2020 at 11:39 AM       CLINICAL PHARMACY CONSULT: MED RECONCILIATION/REVIEW ADDENDUM    For Pharmacy Admin Tracking Only    PHSO: No  Total # of Interventions Recommended: 0  Total Interventions Accepted: 0  Time Spent (min): 45    Satish Sanches, PharmD  [address]

## 2020-11-30 ENCOUNTER — TELEPHONE (OUTPATIENT)
Dept: PHARMACY | Age: 65
End: 2020-11-30

## 2020-12-01 NOTE — TELEPHONE ENCOUNTER
Patient called in to report blood sugars as requested to see how blood sugars responding after last appt. At last appt patient's blood sugars had been elevated due to recent prednisone treatment but were improving. Patient indicates today that blood sugars have not came down to previous levels and remain elevated (see below). Patient indicates what she has been eating. Patient has been splurging at times - for example pie over Thanksgiving holiday but overall blood sugars elevated beyond what expected with diet. Celebrating Thanksgiving on 11/27. Date  Fasting Before Lunch  Before Dinner  11/30  260  171  11/29  164  165   215  11/28  228  298   483  11/27  158  165   227  11/26  233  198   191  11/25  304  202   204  11/24  124  276   235  11/23  405  154   202    Patient has been compliant with all medications. Patient instructed to increase insulin glargine 55 units once a day. Asked patient to report any blood sugars below 80. Patient also asked to report blood sugars in approximately one week to determine need for any further changes. Patient indicates she has enough medication on hand and testing supplies. Satish Sanches,Pharm. D,, BCPS, CACP  11/30/2020  9:27 PM

## 2020-12-15 ENCOUNTER — TELEPHONE (OUTPATIENT)
Dept: PHARMACY | Age: 65
End: 2020-12-15

## 2020-12-15 NOTE — TELEPHONE ENCOUNTER
Spoke with patient for COVID 19 screening secondary to upcoming appointment tomorrow . At this time patient denies symptoms, recent (previous 14 days) positive covid test, recent travel and exposure. Patient will report to [facility] at scheduled time. Patient educated to call physician or go to ER with respiratory symptoms or fever and to not present to appointment if symptomatic. Will coordinate for next appointment accordingly.

## 2020-12-16 ENCOUNTER — HOSPITAL ENCOUNTER (OUTPATIENT)
Dept: PHARMACY | Age: 65
Setting detail: THERAPIES SERIES
Discharge: HOME | End: 2020-12-16
Payer: COMMERCIAL

## 2020-12-16 ENCOUNTER — HOSPITAL ENCOUNTER (OUTPATIENT)
Age: 65
Discharge: HOME | End: 2020-12-16
Payer: COMMERCIAL

## 2020-12-16 VITALS — WEIGHT: 209.1 LBS | BODY MASS INDEX: 34.8 KG/M2 | TEMPERATURE: 96.4 F

## 2020-12-16 LAB
-: ABNORMAL
ABSOLUTE EOS #: 0.2 K/UL (ref 0–0.4)
ABSOLUTE IMMATURE GRANULOCYTE: ABNORMAL K/UL (ref 0–0.3)
ABSOLUTE LYMPH #: 3 K/UL (ref 1–4.8)
ABSOLUTE MONO #: 0.8 K/UL (ref 0.1–1.3)
ALBUMIN SERPL-MCNC: 4.1 G/DL (ref 3.5–5.2)
ALBUMIN/GLOBULIN RATIO: ABNORMAL (ref 1–2.5)
ALP BLD-CCNC: 64 U/L (ref 35–104)
ALT SERPL-CCNC: 26 U/L (ref 5–33)
AMORPHOUS: ABNORMAL
ANION GAP SERPL CALCULATED.3IONS-SCNC: 13 MMOL/L (ref 9–17)
AST SERPL-CCNC: 26 U/L
BACTERIA: ABNORMAL
BASOPHILS # BLD: 1 % (ref 0–2)
BASOPHILS ABSOLUTE: 0.1 K/UL (ref 0–0.2)
BILIRUB SERPL-MCNC: 0.54 MG/DL (ref 0.3–1.2)
BILIRUBIN URINE: ABNORMAL
BUN BLDV-MCNC: 18 MG/DL (ref 8–23)
BUN/CREAT BLD: ABNORMAL (ref 9–20)
CALCIUM SERPL-MCNC: 9.8 MG/DL (ref 8.6–10.4)
CASTS UA: ABNORMAL /LPF
CASTS UA: ABNORMAL /LPF
CHLORIDE BLD-SCNC: 102 MMOL/L (ref 98–107)
CHOLESTEROL, FASTING: 135 MG/DL
CHOLESTEROL/HDL RATIO: 2.7
CO2: 24 MMOL/L (ref 20–31)
COLOR: YELLOW
COMMENT UA: ABNORMAL
CREAT SERPL-MCNC: 1.32 MG/DL (ref 0.5–0.9)
CREATININE URINE: 222.8 MG/DL (ref 28–217)
CRYSTALS, UA: ABNORMAL /HPF
DIFFERENTIAL TYPE: ABNORMAL
EOSINOPHILS RELATIVE PERCENT: 3 % (ref 0–4)
EPITHELIAL CELLS UA: ABNORMAL /HPF
ESTIMATED AVERAGE GLUCOSE: 200 MG/DL
GFR AFRICAN AMERICAN: 49 ML/MIN
GFR NON-AFRICAN AMERICAN: 41 ML/MIN
GFR SERPL CREATININE-BSD FRML MDRD: ABNORMAL ML/MIN/{1.73_M2}
GFR SERPL CREATININE-BSD FRML MDRD: ABNORMAL ML/MIN/{1.73_M2}
GLUCOSE BLD-MCNC: 93 MG/DL (ref 70–99)
GLUCOSE URINE: NEGATIVE
HBA1C MFR BLD: 8.6 % (ref 4–6)
HCT VFR BLD CALC: 35.8 % (ref 36–46)
HDLC SERPL-MCNC: 50 MG/DL
HEMOGLOBIN: 11.4 G/DL (ref 12–16)
IMMATURE GRANULOCYTES: ABNORMAL %
KETONES, URINE: NEGATIVE
LDL CHOLESTEROL: 55 MG/DL (ref 0–130)
LEUKOCYTE ESTERASE, URINE: ABNORMAL
LYMPHOCYTES # BLD: 31 % (ref 24–44)
MAGNESIUM: 1.5 MG/DL (ref 1.6–2.6)
MCH RBC QN AUTO: 25.2 PG (ref 26–34)
MCHC RBC AUTO-ENTMCNC: 31.7 G/DL (ref 31–37)
MCV RBC AUTO: 79.6 FL (ref 80–100)
MICROALBUMIN/CREAT 24H UR: 291 MG/L
MICROALBUMIN/CREAT UR-RTO: 131 MCG/MG CREAT
MONOCYTES # BLD: 8 % (ref 1–7)
MUCUS: ABNORMAL
NITRITE, URINE: NEGATIVE
NRBC AUTOMATED: ABNORMAL PER 100 WBC
OTHER OBSERVATIONS UA: ABNORMAL
PDW BLD-RTO: 16.9 % (ref 11.5–14.9)
PH UA: 5.5 (ref 5–8)
PLATELET # BLD: 287 K/UL (ref 150–450)
PLATELET ESTIMATE: ABNORMAL
PMV BLD AUTO: 8 FL (ref 6–12)
POTASSIUM SERPL-SCNC: 4.3 MMOL/L (ref 3.7–5.3)
PROTEIN UA: ABNORMAL
RBC # BLD: 4.5 M/UL (ref 4–5.2)
RBC # BLD: ABNORMAL 10*6/UL
RBC UA: ABNORMAL /HPF
RENAL EPITHELIAL, UA: ABNORMAL /HPF
SEG NEUTROPHILS: 57 % (ref 36–66)
SEGMENTED NEUTROPHILS ABSOLUTE COUNT: 5.8 K/UL (ref 1.3–9.1)
SODIUM BLD-SCNC: 139 MMOL/L (ref 135–144)
SPECIFIC GRAVITY UA: 1.02 (ref 1–1.03)
THYROXINE, FREE: 1.18 NG/DL (ref 0.93–1.7)
TOTAL PROTEIN: 7 G/DL (ref 6.4–8.3)
TRICHOMONAS: ABNORMAL
TRIGLYCERIDE, FASTING: 150 MG/DL
TSH SERPL DL<=0.05 MIU/L-ACNC: 0.83 MIU/L (ref 0.3–5)
TURBIDITY: ABNORMAL
URINE HGB: ABNORMAL
UROBILINOGEN, URINE: NORMAL
VLDLC SERPL CALC-MCNC: ABNORMAL MG/DL (ref 1–30)
WBC # BLD: 9.9 K/UL (ref 3.5–11)
WBC # BLD: ABNORMAL 10*3/UL
WBC UA: ABNORMAL /HPF
YEAST: ABNORMAL

## 2020-12-16 PROCEDURE — 83036 HEMOGLOBIN GLYCOSYLATED A1C: CPT

## 2020-12-16 PROCEDURE — 81001 URINALYSIS AUTO W/SCOPE: CPT

## 2020-12-16 PROCEDURE — 82043 UR ALBUMIN QUANTITATIVE: CPT

## 2020-12-16 PROCEDURE — 84443 ASSAY THYROID STIM HORMONE: CPT

## 2020-12-16 PROCEDURE — 36415 COLL VENOUS BLD VENIPUNCTURE: CPT

## 2020-12-16 PROCEDURE — 84439 ASSAY OF FREE THYROXINE: CPT

## 2020-12-16 PROCEDURE — 83735 ASSAY OF MAGNESIUM: CPT

## 2020-12-16 PROCEDURE — 80061 LIPID PANEL: CPT

## 2020-12-16 PROCEDURE — 85025 COMPLETE CBC W/AUTO DIFF WBC: CPT

## 2020-12-16 PROCEDURE — 80053 COMPREHEN METABOLIC PANEL: CPT

## 2020-12-16 PROCEDURE — 82570 ASSAY OF URINE CREATININE: CPT

## 2020-12-16 PROCEDURE — 99213 OFFICE O/P EST LOW 20 MIN: CPT

## 2020-12-16 PROCEDURE — 82306 VITAMIN D 25 HYDROXY: CPT

## 2020-12-16 RX ORDER — NITROGLYCERIN 0.4 MG/1
0.4 TABLET SUBLINGUAL EVERY 5 MIN PRN
COMMUNITY

## 2020-12-16 RX ORDER — BRIMONIDINE TARTRATE 2 MG/ML
1 SOLUTION/ DROPS OPHTHALMIC 2 TIMES DAILY
COMMUNITY
Start: 2020-12-10

## 2020-12-16 NOTE — PROGRESS NOTES
Diabetic Medication Management   [facility]: ELISABETH MONTANO    [address]. [place], [address] [facility]  Phone: [phone]  Fax: [phone]    NAME: [address] [facility] RECORD NUMBER:  [id number]  AGE: 60 y.o. GENDER: female  : 1955  EPISODE DATE:  2020       Ms. Maite Robertson was referred to [facility] by Dr. Saadia Valderrama, Special instructions include: follow up every 3 months with physician in office     Goals per referral:   Fasting blood glucose:   Peak postprandial glucose: < 180  A1C: < 7%    Other goals:  Blood pressure goal: 130/80    Weight loss goal (~10%): Target weight  200 to be reached by date: Mar 2020 (3-6 months)  Physical Activity goal:    15 minutes 5 times per week to be reached by date: Mar 2020 (3-6 months)  Smoking Cessation   Quit Date: never smoked  Cholesterol at target:Yes    Date: 2020  Annual eye exam:    Date: Dec 9th, 2020  Comprehensive annual foot exam:   Date: 19    Annual urine Albumin and serum creatinine:   Date: today 2020 SrCr 1.32 mg/dL; microalbumin 222.8 mg/L    Patient Specific Goals:  1. Lower blood sugars  2. Lower A1C    Subjective   Ms. Maite Robertson is a 60 y.o. female here for the Diabetes Service for self-management education, medication review including over the counter medications and herbal products, overall wellbeing assessment, transition of care and any needed adjustments with updates and recommendations communicated to the referring physician. Patient's name and  verified. Patient visit in person in office. Patient Findings:    Patient went to the store and got some more food yesterday. Did not go to [place] but plans to go in next few days. Will try to have healthier food available to make better choices. Patient states she still gags about every day but unsure what is causing it. States it may be a bit better than before. No hypoglycemia since last visit. Patient has cleared off her exercise bike but not used much. States she walked a lot lately - yesterday. Will start with goal of 15 min on the exercise bike three days a week. Had appt on 12/9 for eye exam.  - Patient states she attended appt and was started on eye drops for glaucoma. Medication list in Epic updated. Patient saw Dr. Shruti Murcia and will have a stress test per patient to evaluate chest pain. Also started in SL nitro SL as needed for chest pain. Admits she has strayed from her diet at times due to having a lot of appts and being busy. Trying to get back on track. Patient reports missing one dose of Lantus and one dose of metformin due to falling asleep and not waking up for some time. Patient thinks she has enough or has refills on all testing supplies and diabetic medications.       Next appt with Dr. Stephen Albarran 12/28 at 11:30am.    [x]  Missed doses   []  Emergency Room Visit    []  Medication changes  []  Hospitalization   [x]  Diet changes   []  Acute illness   []  Activity changes      Symptoms of hypoglycemia -     [x]  None   []  Shakiness    []  Lightheadedness or dizziness   []  Confusion      []  Sweating   []  Other       Symptoms of hyperglycemia   [x]  None   []  Frequent urination    []  Increased thirst   []  Other    Medication adverse reactions   [x]  None   []  Diarrhea / Nausea / Vomiting / Constipation / flatulence   []  Hypertension   []  Peripheral edema     []  Signs of infection   []  Headache   []  Vision changes   []  Increased cholesterol    []  Weight gain   []  Change in renal function   []  Increased potassium  []  Other  Comments:      If recent hospital admission / ED visit, was this related to Diabetes No:hypotension Discharge date July 2020    Objective     PMHx:    Past Medical History:   Diagnosis Date    Anxiety     Asthma     Common cold     [facility] [address]     Depression     Diabetes mellitus ([facility] [address]  Emphysema     Fatty liver     H/O: pneumonia FEB. 2014    Hiatal hernia     Hyperlipidemia     Hypertension     Mitral valve prolapse     Psychosis (HCC)     SOB (shortness of breath) on exertion     Spinal headache     Stroke ([facility] [address] 2013    Mild Lt side    Upper respiratory symptom        Social History:    Social History     Tobacco Use    Smoking status: Passive Smoke Exposure - Never Smoker    Smokeless tobacco: Never Used    Tobacco comment: socially smoked when playing cards   Substance Use Topics    Alcohol use: No     Alcohol/week: 0.0 standard drinks     Comment: socially       Pertinent Labs:    Lab Results   Component Value Date    LABA1C 8.6 (H) 12/16/2020    LABA1C 7.4 09/23/2020    LABA1C 7.5 06/29/2020     Lab Results   Component Value Date    CHOL 145 02/12/2020    TRIG 158 02/12/2020    HDL 50 12/16/2020    LDLCALC 59 02/12/2020     Lab Results   Component Value Date    CREATININE 1.32 (H) 12/16/2020    BUN 18 12/16/2020     12/16/2020    K 4.3 12/16/2020     12/16/2020    CO2 24 12/16/2020     Lab Results   Component Value Date    ALT 26 12/16/2020       Weight:  Wt Readings from Last 3 Encounters:   12/16/20 209 lb 1.6 oz (94.8 kg)   11/17/20 211 lb 3.2 oz (95.8 kg)   10/21/20 213 lb 8 oz (96.8 kg)       Current medications:  Prior to Admission medications    Medication Sig Start Date End Date Taking? Authorizing Provider   brimonidine (ALPHAGAN) 0.2 % ophthalmic solution Place 1 drop into both eyes 2 times daily 12/10/20  Yes Historical Provider, MD   nitroGLYCERIN (NITROSTAT) 0.4 MG SL tablet Place 0.4 mg under the tongue every 5 minutes as needed for Chest pain up to max of 3 total doses. If no relief after 1 dose, call 911. Yes Historical Provider, MD   Lancets MISC Use to check blood sugar 3-4 times daily (before meals and at bedtime) and as needed for symptoms.  10/21/20   John Gascainter insulin glargine (LANTUS SOLOSTAR) 100 UNIT/ML injection pen Inject 48 Units into the skin nightly  Patient taking differently: Inject 50 Units into the skin nightly  10/7/20   Estevan Herjami   Insulin Pen Needle 32G X 4 MM MISC Use to inject Trulicity once a week on Wednesdays and Lantus once a day. 9/9/20   Estevan Car   metFORMIN (GLUCOPHAGE XR) 500 MG extended release tablet Take 2 tablets by mouth 2 times daily (with meals) 9/9/20   Estevan Car   blood glucose monitor strips Test 3-4 times a day & as needed for symptoms of irregular blood glucose. Dispense sufficient amount for indicated testing frequency plus additional to accommodate PRN testing needs.  9/9/20   Hailee Santana   albuterol sulfate  (90 Base) MCG/ACT inhaler Inhale 2 puffs into the lungs every 6 hours as needed for Wheezing    Historical Provider, MD   Melatonin 5 MG CAPS Take 1 capsule by mouth nightly as needed (sleep)    Historical Provider, MD   tiotropium (SPIRIVA RESPIMAT) 2.5 MCG/ACT AERS inhaler Inhale 2 puffs into the lungs daily    Historical Provider, MD   escitalopram (LEXAPRO) 20 MG tablet Take 20 mg by mouth daily     Historical Provider, MD   aspirin 81 MG chewable tablet Take 81 mg by mouth    Historical Provider, MD   metoprolol succinate (TOPROL XL) 50 MG extended release tablet Take 50 mg by mouth daily  1/12/18   Historical Provider, MD   Dulaglutide (TRULICITY) 1.5 ZC/5.0SV SOPN Inject 1.5 mg into the skin once a week Takes on WEd 1/12/18   Historical Provider, MD   budesonide-formoterol (SYMBICORT) 160-4.5 MCG/ACT AERO 2 puffs twice a day 5/1/18   Historical Provider, MD   Cholecalciferol (VITAMIN D3) 2000 units CAPS Take by mouth daily    Historical Provider, MD   FLUARIX QUADRIVALENT 0.5 ML injection inject 0.5 milliliter intramuscularly 8/4/17   Historical Provider, MD losartan (COZAAR) 50 MG tablet Take 50 mg by mouth daily  10/25/17   Historical Provider, MD   brexpiprazole (REXULTI) 4 MG TABS tablet Take 2 mg by mouth daily     Historical Provider, MD   rosuvastatin (CRESTOR) 40 MG tablet Take 40 mg by mouth every evening    Historical Provider, MD   montelukast (SINGULAIR) 10 MG tablet Take 10 mg by mouth nightly    Historical Provider, MD   pantoprazole (PROTONIX) 40 MG tablet Take 1 tablet by mouth daily 7/20/16   Disha Ann PA-C   loratadine (CLARITIN) 10 MG tablet Take 10 mg by mouth daily as needed (allergies)     Historical Provider, MD   albuterol (PROVENTIL) (2.5 MG/3ML) 0.083% nebulizer solution Take 2.5 mg by nebulization every 6 hours as needed for Wheezing. Historical Provider, MD       Immunizations:   Most Recent Immunizations   Administered Date(s) Administered    Influenza Virus Vaccine 11/20/2015    Pneumococcal Conjugate 13-valent (Dimitri Sifuentes) 11/20/2015       Home Blood Glucose Results:     Blood glucose trends noted:      Date  Fasting   Before lunch   Before dinner  Bedtime  12/16  103  12/15  120   129         12/14  285 (after bkfst) 306     12/13     94   157    12/12  107   180   124  12/11  121   88   95  12/10  158   100     12/9  124   116   203 (franck noodle day)  12/8  169   165   165  12/7  276 (2 eggs/toast) 166 (turkey sand)     12/6  134   197     12/5  137   278   279  12/4  176   182   168  12/3  111   111   135      Patient's blood sugars are trending in right direction but still elevated above goal and A1c at today's appt has increased significantly since Sept of this year. Patient's blood sugars are within goal range when patient eats as instructed but when she cheats her blood sugars elevate. Patient has been cheating more with diet due to being busy. Discussed importance of planning ahead to eat healthy even when busy or out of house for appts.       Assessment     A1c at goal: No and increasing  8.6 on 12/16//2020 Blood Pressure at goal: Yes 116/58 on 7/25/20  Weight at goal: No   Physical activity: nothing consistent  Physical activity at goal: no  Smoking Cessation: Yes  Cholesterol at target: Yes: LDL 55  Annual eye exam completed: Yes 12/9/2020  Comprehensive Foot Exam Completed: Yes  Annual urine albumin and serum creatinine: Yes    Statin: Yes    Appropriate?: Yes  Changes made:     ACE/ARB: Yes  Appropriate?: Yes  Changes made:     Aspirin: Yes  Appropriate?: Yes  Changes made:     Eating patterns:    []  My plate    []  Mediterranean diet   []  Low sodium   []  DASH diet   []  Portion control   []  Reduced calorie    []  Fast food / Restaurant  []  High glycemic index foods   []  Sugary beverages   [x]  Other     Comment: see above    Current Medications Affecting Diabetes:  Dulaglutide 1.5mg subQ once weekly on Wednesdays  Insulin glargine 55 units subQ each evening  Metformin XR 1000mg in the morning and 1000 mg XR in the evening       Compliant: Yes for the most part. Admits to infrequent missed doses due to falling asleep. Barriers to medication therapy: No     Medications attempted in the past:  Metformin about 3 years ago with some diarrhea. Unsure if regular or extended release. Tolerating ER well. SGLT2 Fernie Stallion) frequent yeast infections. Recent exacerbations / new problems:       Last office dictation reviewed: yes        type 2 DM under worsening control as evidenced by A1C of 8.6 on 12/16/2020    Plan     Physician Follow-up:  Every 3 months     Medication Management Follow-up:   Diabetes Service     Continue Insulin glargine (Lantus) to 55 Units subQ each evening. Blood sugars are elevated above goal ranges when patient cheats on her diet but when eats as instructed her blood sugars are within goal range. Seems to be cheating more lately. Patient states she is motivated to improve diet. Hesitant to increase dose due to potential for hypoglycemia if patient follows appropriate diet. Continue Dulaglutide (Trulicity) 9.5AO subQ once weekly on Wednesdays discussed at appt today prior to getting A1c result. Patient states she would like to try diet and exercise prior to increasing medication. With increase in A1c will contact patient to encourage her to increase to Trulicity 3mg subQ once weekly. Patient has been on an SGLT2 in the past and was unable to tolerate due to recurrent yeast infections.    -continue Metformin XR 1000mg in the morning and 1000 mg XR in the evening    Check blood sugars three times daily and document in sugar log. Patient to reece down whenever she eats something she feels is a treat or splurg. Encouraged her to write down what happened the meal prior for any blood sugars below 70 or above 20. Bring in glucose log to every appt. Microalbumin ur has increased significantly since last checked. Patient already taking Ace inhibitor. Use exercise bike at least three days a week. Electronically signed by Gissel Condon RPH on 12/16/2020 at 10:39 PM     CLINICAL PHARMACY CONSULT: MED RECONCILIATION/REVIEW ADDENDUM    For Pharmacy Admin Tracking Only    PHSO: No  Total # of Interventions Recommended: 2  - Increased Dose #: 1  - Updated Order #: 1 Updated Order Reason(s):  Other  - Maintenance Safety Lab Monitoring #: 1  Total Interventions Accepted: 2  Time Spent (min): 60    Satish Sanches, PharmD  [address]

## 2020-12-17 ENCOUNTER — TELEPHONE (OUTPATIENT)
Dept: PHARMACY | Age: 65
End: 2020-12-17

## 2020-12-17 LAB — VITAMIN D 25-HYDROXY: 60.6 NG/ML (ref 30–100)

## 2020-12-17 RX ORDER — DULAGLUTIDE 3 MG/.5ML
3 INJECTION, SOLUTION SUBCUTANEOUS WEEKLY
Qty: 5 PEN | Refills: 3 | OUTPATIENT
Start: 2020-12-17 | End: 2022-10-05 | Stop reason: SDUPTHER

## 2020-12-17 NOTE — TELEPHONE ENCOUNTER
Patient contacted about increased A1c and need to intensify treatment. Trulicity will be increased to 3mg one weekly on Wednesdays. Satish Sanches,Pharm. D,, [facility]  12/17/2020  11:47 AM

## 2020-12-28 LAB — HBA1C MFR BLD: 8 %

## 2020-12-31 ENCOUNTER — HOSPITAL ENCOUNTER (OUTPATIENT)
Dept: WOMENS IMAGING | Age: 65
Discharge: HOME | End: 2021-01-02
Payer: COMMERCIAL

## 2020-12-31 DIAGNOSIS — Z12.39 SCREENING BREAST EXAMINATION: ICD-10-CM

## 2020-12-31 PROCEDURE — 77063 BREAST TOMOSYNTHESIS BI: CPT

## 2021-01-05 ENCOUNTER — TELEPHONE (OUTPATIENT)
Dept: PHARMACY | Age: 66
End: 2021-01-05

## 2021-01-05 NOTE — TELEPHONE ENCOUNTER
Spoke with patient for COVID 19 screening secondary to upcoming appointment tomorrow . At this time patient denies symptoms, recent (previous 14 days) positive covid test, recent travel and exposure. Patient will report to [facility] at scheduled time. Patient educated to call physician or go to ER with respiratory symptoms or fever and to not present to appointment if symptomatic. Will coordinate for next appointment accordingly.      Luz Sterling, PharmD, 1/5/2021 1:58 PM

## 2021-01-06 ENCOUNTER — HOSPITAL ENCOUNTER (OUTPATIENT)
Dept: PHARMACY | Age: 66
Setting detail: THERAPIES SERIES
Discharge: HOME | End: 2021-01-06
Payer: COMMERCIAL

## 2021-01-06 VITALS — WEIGHT: 214.2 LBS | TEMPERATURE: 96.8 F | BODY MASS INDEX: 35.64 KG/M2

## 2021-01-06 PROCEDURE — 99212 OFFICE O/P EST SF 10 MIN: CPT

## 2021-01-06 NOTE — PROGRESS NOTES
Diabetic Medication Management   [facility] [place] [facility]    [address] [facility]  Phone: [phone]  Fax: [phone]    NAME: Natty [facility] RECORD NUMBER:  [id number]  AGE: 60 y.o. GENDER: female  : 1955  EPISODE DATE:  2021       Ms. Enrrique Segura was referred to [facility] Medication Management Services by Dr. Ravinder Booth, Special instructions include: follow up every 3 months with physician in office     Goals per referral:   Fasting blood glucose:   Peak postprandial glucose: < 180  A1C: < 7%    Other goals:  Blood pressure goal: 130/80    Weight loss goal (~10%): Target weight  200 to be reached by date: Mar 2020 (3-6 months)  Physical Activity goal:    15 minutes 5 times per week to be reached by date: Mar 2020 (3-6 months)  Smoking Cessation   Quit Date: never smoked  Cholesterol at target:Yes    Date: 2020  Annual eye exam:    Date: Dec 9th, 2020  Comprehensive annual foot exam:   Date: 19    Annual urine Albumin and serum creatinine:   Date: today 2020 SrCr 1.32 mg/dL; microalbumin 222.8 mg/L    Patient Specific Goals:  1. Lower blood sugars  2. Lower A1C    Subjective   Ms. Enrrique Segura is a 60 y.o. female here for the Diabetes Service for self-management education, medication review including over the counter medications and herbal products, overall wellbeing assessment, transition of care and any needed adjustments with updates and recommendations communicated to the referring physician. Patient's name and  verified. Patient visit in person in office. Patient Findings:  Patient was to Dr. Ravinder Booth (endocrinologist) on 2020 and had A1c measured at 8.0 in office as well as BP of 128/68. This A1c is down from 8.6 on 20. No changes made at this appt. Patient increased Trulicity to 3mg once a week on Wednesdays and will start with first dose today. Did not start before as finishing her lower doses she had and was not delivered as of yet. Weight has increased since last visit but patient has been doing better in eating she states. States she has been eating more fruit (diet applesauce, small orange, apple). Patient is bring more intentional about eating three meals a day. Eating meat and vegetable more often. Eating an egg with 2 pieces of rye toast with breakfast.     Patient states she is still gagging at times and sometimes throwing up. States it is a little better. Has discussed with her physician. Patient may have urinary retention it seems as she has urgency to urinate and should be quite a bit but only a very small volume. Feels relieved for short time then has urgency again. Has appt with PCP in March and encouraged her to discuss or call sooner if gets any worse. Has not missed any does of Lantus or evening metformin. Admits to missing a couple does of metformin in the morning due to having am appt and being afraid she would be sick. No hypoglycemia since last visit. Patient has not used her exercise bike as of yet but will try to have goal of starting. Will start with goal of 15 min on the exercise bike three days a week. Patient thinks she has enough or has refills on all testing supplies and diabetic medications.       Next appt with Dr. Ruth Alejandre 3/16 at 10:15am    [x]  Missed doses   []  Emergency Room Visit    []  Medication changes  []  Hospitalization   [x]  Diet changes   []  Acute illness   []  Activity changes      Symptoms of hypoglycemia -     [x]  None   []  Shakiness    []  Lightheadedness or dizziness   []  Confusion      []  Sweating   []  Other       Symptoms of hyperglycemia   [x]  None   []  Frequent urination    []  Increased thirst   []  Other    Medication adverse reactions   [x]  None []  Diarrhea / Nausea / Vomiting / Constipation / flatulence   []  Hypertension   []  Peripheral edema     []  Signs of infection   []  Headache   []  Vision changes   []  Increased cholesterol    []  Weight gain   []  Change in renal function   []  Increased potassium  []  Other  Comments:      If recent hospital admission / ED visit, was this related to Diabetes No:hypotension Discharge date July 2020    Objective     PMHx:    Past Medical History:   Diagnosis Date    Anxiety     Asthma     Common cold     [facility]     Depression     Diabetes mellitus ([facility]     Emphysema     Fatty liver     H/O: pneumonia FEB. 2014    Hiatal hernia     Hyperlipidemia     Hypertension     Mitral valve prolapse     Psychosis (HCC)     SOB (shortness of breath) on exertion     Spinal headache     Stroke ([facility] 2013    Mild Lt side    Upper respiratory symptom        Social History:    Social History     Tobacco Use    Smoking status: Passive Smoke Exposure - Never Smoker    Smokeless tobacco: Never Used    Tobacco comment: socially smoked when playing cards   Substance Use Topics    Alcohol use: No     Alcohol/week: 0.0 standard drinks     Comment: socially       Pertinent Labs:    Lab Results   Component Value Date    LABA1C 8 12/28/2020    LABA1C 8.6 (H) 12/16/2020    LABA1C 7.4 09/23/2020     Lab Results   Component Value Date    CHOL 145 02/12/2020    TRIG 158 02/12/2020    HDL 50 12/16/2020    LDLCALC 59 02/12/2020     Lipid panel on 12/16/2020  Cholesterol 135  HDL 50  LDL 55  Triglyceride 150      Lab Results   Component Value Date    CREATININE 1.32 (H) 12/16/2020    BUN 18 12/16/2020     12/16/2020    K 4.3 12/16/2020     12/16/2020    CO2 24 12/16/2020     CrCl calculated at 66ml/min      Lab Results   Component Value Date    ALT 26 12/16/2020       Weight:  Wt Readings from Last 3 Encounters:   01/06/21 214 lb 3.2 oz (97.2 kg) 12/16/20 209 lb 1.6 oz (94.8 kg)   11/17/20 211 lb 3.2 oz (95.8 kg)       Current medications:  Prior to Admission medications    Medication Sig Start Date End Date Taking? Authorizing Provider   Dulaglutide (TRULICITY) 3 RW/2.2XJ SOPN Inject 3 mg into the skin once a week on Wednesdays 12/17/20   Joe Screen   brimonidine [facility]) 0.2 % ophthalmic solution Place 1 drop into both eyes 2 times daily 12/10/20   Historical Provider, MD   nitroGLYCERIN (NITROSTAT) 0.4 MG SL tablet Place 0.4 mg under the tongue every 5 minutes as needed for Chest pain up to max of 3 total doses. If no relief after 1 dose, call 911. Historical Provider, MD   Lancets MISC Use to check blood sugar 3-4 times daily (before meals and at bedtime) and as needed for symptoms. 10/21/20   Joe Screen   insulin glargine (LANTUS SOLOSTAR) 100 UNIT/ML injection pen Inject 48 Units into the skin nightly  Patient taking differently: Inject 50 Units into the skin nightly  10/7/20   Joe Screen   Insulin Pen Needle 32G X 4 MM MISC Use to inject Trulicity once a week on Wednesdays and Lantus once a day. 9/9/20   Joe Screen   metFORMIN (GLUCOPHAGE XR) 500 MG extended release tablet Take 2 tablets by mouth 2 times daily (with meals) 9/9/20   [place] Screen   blood glucose monitor strips Test 3-4 times a day & as needed for symptoms of irregular blood glucose. Dispense sufficient amount for indicated testing frequency plus additional to accommodate PRN testing needs.  9/9/20   Derek Santana   albuterol sulfate  (90 Base) MCG/ACT inhaler Inhale 2 puffs into the lungs every 6 hours as needed for Wheezing    Historical Provider, MD   Melatonin 5 MG CAPS Take 1 capsule by mouth nightly as needed (sleep)    Historical Provider, MD   tiotropium (SPIRIVA RESPIMAT) 2.5 MCG/ACT AERS inhaler Inhale 2 puffs into the lungs daily    Historical Provider, MD escitalopram (LEXAPRO) 20 MG tablet Take 20 mg by mouth daily     Historical Provider, MD   aspirin 81 MG chewable tablet Take 81 mg by mouth    Historical Provider, MD   metoprolol succinate (TOPROL XL) 50 MG extended release tablet Take 50 mg by mouth daily  1/12/18   Historical Provider, MD   budesonide-formoterol (SYMBICORT) 160-4.5 MCG/ACT AERO 2 puffs twice a day 5/1/18   Historical Provider, MD   Cholecalciferol (VITAMIN D3) 2000 units CAPS Take by mouth daily    Historical Provider, MD   FLUARIX QUADRIVALENT 0.5 ML injection inject 0.5 milliliter intramuscularly 8/4/17   Historical Provider, MD   losartan (COZAAR) 50 MG tablet Take 50 mg by mouth daily  10/25/17   Historical Provider, MD   brexpiprazole (REXULTI) 4 MG TABS tablet Take 2 mg by mouth daily     Historical Provider, MD   rosuvastatin (CRESTOR) 40 MG tablet Take 40 mg by mouth every evening    Historical Provider, MD   montelukast (SINGULAIR) 10 MG tablet Take 10 mg by mouth nightly    Historical Provider, MD   pantoprazole (PROTONIX) 40 MG tablet Take 1 tablet by mouth daily 7/20/16   Evie Daniel PA-C   loratadine (CLARITIN) 10 MG tablet Take 10 mg by mouth daily as needed (allergies)     Historical Provider, MD   albuterol (PROVENTIL) (2.5 MG/3ML) 0.083% nebulizer solution Take 2.5 mg by nebulization every 6 hours as needed for Wheezing.     Historical Provider, MD       Immunizations:   Most Recent Immunizations   Administered Date(s) Administered    Influenza Virus Vaccine 11/20/2015    Pneumococcal Conjugate 13-valent ([address]) 11/20/2015       Home Blood Glucose Results:     Blood glucose trends noted:      Date  Fasting   Before lunch   Before dinner  Bedtime  1/6  86  1/5  100   182 (post prandial)  1/4  86   127  1/3  99   202*   149      1/2  121   84     1/1     97   133  12/31  116   118   113  12/30  118   139   107  12/29  104      111  12/28  192**   124   163  12/27     114  12/26  115   195 (post prandial) 12/25  167      196  12/24  122   235   192  12/23  154   111   215  12/22  103   179     12/21  93   122   93  12/20  157   87   149  12/19  153   139     12/18     107   101  12/17  127   101   109      *ate 4 pieces of pizza for meal prior / **missed morning meds day before      Patient's blood sugars have improved with most of her fasting blood sugars being within range but still having some blood sugars above range likely due to not following diet and holiday cheat meals/snacks. Assessment     A1c at goal: No and increasing  8.0 on 12/28/2020   Blood Pressure at goal: Yes 128/68 on 12/28/20  Weight at goal: No   Physical activity: nothing consistent  Physical activity at goal: no  Smoking Cessation: Yes  Cholesterol at target: Yes: LDL 55  Annual eye exam completed: Yes 12/9/2020  Comprehensive Foot Exam Completed: Yes  Annual urine albumin and serum creatinine: Yes    Statin: Yes    Appropriate?: Yes  Changes made:     ACE/ARB: Yes  Appropriate?: Yes  Changes made:     Aspirin: Yes  Appropriate?: Yes  Changes made:     Eating patterns:    []  My plate    []  Mediterranean diet   []  Low sodium   []  DASH diet   []  Portion control   []  Reduced calorie    []  Fast food / Restaurant  []  High glycemic index foods   []  Sugary beverages   [x]  Other     Comment: see above    Current Medications Affecting Diabetes:  Dulaglutide 3mg subQ once weekly on Wednesdays  Insulin glargine 55 units subQ each evening  Metformin XR 1000mg in the morning and 1000 mg XR in the evening       Compliant: Yes for the most part. Admits to infrequent missed doses due to falling asleep. Barriers to medication therapy: No     Medications attempted in the past:  Metformin about 3 years ago with some diarrhea. Unsure if regular or extended release. Tolerating ER well. SGLT2 Thomas Castro) frequent yeast infections.      Recent exacerbations / new problems:       Last office dictation reviewed: yes type 2 DM under improving control as evidenced by A1C of 8.0 on 12/28/2020    Plan     Physician Follow-up:  Every 3 months     Medication Management Follow-up:   Diabetes Service     Continue Insulin glargine (Lantus) to 55 Units subQ each evening. Blood sugars have improved but at times are still a bit elevated. Likely due to cheating on diet over holidays. Patient agreeable to continue to healthier diet and plans to start exercising. Continue Dulaglutide (Trulicity) at increased dose of 3mg subQ once weekly on Wednesdays. Patient to start at this increased dose today.     -continue Metformin XR 1000mg in the morning and 1000 mg XR in the evening    Check blood sugars three times daily and document in sugar log. Patient to reece down whenever she eats something she feels is a treat or splurg. Encouraged her to write down what happened the meal prior for any blood sugars below 70 or above 20. Bring in glucose log to every appt. Use exercise bike at least three days a week for 15 minutes at a time.      Electronically signed by Nic Arechiga RPH on 1/6/2021 at 9:55 AM     CLINICAL PHARMACY CONSULT: MED RECONCILIATION/REVIEW ADDENDUM    For Pharmacy Admin Tracking Only    PHSO: No  Total # of Interventions Recommended: 1  - Increased Dose #: 1  Total Interventions Accepted: 1  Time Spent (min): 45    Satish Sanches, PharmD  [address]

## 2021-02-09 ENCOUNTER — TELEPHONE (OUTPATIENT)
Dept: PHARMACY | Age: 66
End: 2021-02-09

## 2021-02-09 NOTE — TELEPHONE ENCOUNTER
Spoke with patient for COVID 19 screening secondary to upcoming appointment tomorrow . At this time patient denies symptoms, recent (previous 14 days) positive covid test, recent travel and exposure. Patient will report to [facility] at scheduled time. Patient educated to call physician or go to ER with respiratory symptoms or fever and to not present to appointment if symptomatic. Will coordinate for next appointment accordingly. Kait Golden [facility].   [address] [facility] Medication Management Services  [phone]

## 2021-02-10 ENCOUNTER — HOSPITAL ENCOUNTER (OUTPATIENT)
Dept: PHARMACY | Age: 66
Setting detail: THERAPIES SERIES
Discharge: HOME | End: 2021-02-10
Payer: COMMERCIAL

## 2021-02-10 VITALS — BODY MASS INDEX: 35.94 KG/M2 | WEIGHT: 216 LBS | TEMPERATURE: 96 F

## 2021-02-10 PROCEDURE — 99212 OFFICE O/P EST SF 10 MIN: CPT

## 2021-02-10 NOTE — PROGRESS NOTES
Diabetic Medication Management   [address] [place] [place], [address] [facility]  Phone: [phone]  Fax: [phone]    NAME: Natty [facility] RECORD NUMBER:  [id number]  AGE: 60 y.o. GENDER: female  : 1955  EPISODE DATE:  2/10/2021       Ms. Marco Antonio Jovel was referred to [facility] Medication Management Services by Dr. Xuan Albarran, Special instructions include: follow up every 3 months with physician in office     Goals per referral:   Fasting blood glucose:   Peak postprandial glucose: < 180  A1C: < 7%    Other goals:  Blood pressure goal: 130/80    Weight loss goal (~10%): Target weight  200 to be reached by date: Mar 2020 (3-6 months)  Physical Activity goal:    15 minutes 5 times per week to be reached by date: Mar 2020 (3-6 months)  Smoking Cessation   Quit Date: never smoked  Cholesterol at target:Yes    Date: 2020  Annual eye exam:    Date: Dec 9th, 2020  Comprehensive annual foot exam:   Date: 19    Annual urine Albumin and serum creatinine:   Date: today 2020 SrCr 1.32 mg/dL; microalbumin 222.8 mg/L    Patient Specific Goals:  1. Lower blood sugars  2. Lower A1C    Subjective   Ms. Marco Antonio Jovel is a 60 y.o. female here for the Diabetes Service for self-management education, medication review including over the counter medications and herbal products, overall wellbeing assessment, transition of care and any needed adjustments with updates and recommendations communicated to the referring physician. Patient's name and  verified. Patient visit in person in office. Patient Findings:  Patient was to Dr. Xuan Albarran (endocrinologist) in December will see again in  at 10:15am.    Patient has been taking Trulicity 3mg once a week on  and states she is tolerating well. A little discouraged that blood sugar continue to go up. PMHx:    Past Medical History:   Diagnosis Date    Anxiety     Asthma     Common cold     [facility]     Depression     Diabetes mellitus (HCC)     Emphysema     Fatty liver     H/O: pneumonia FEB. 2014    Hiatal hernia     Hyperlipidemia     Hypertension     Mitral valve prolapse     Psychosis (HCC)     SOB (shortness of breath) on exertion     Spinal headache     [facility] 2013    Mild Lt side    Upper respiratory symptom        Social History:    Social History     Tobacco Use    Smoking status: Passive Smoke Exposure - Never Smoker    Smokeless tobacco: Never Used    Tobacco comment: socially smoked when playing cards   Substance Use Topics    Alcohol use: No     Alcohol/week: 0.0 standard drinks     Comment: socially       Pertinent Labs:    Lab Results   Component Value Date    LABA1C 8 12/28/2020    LABA1C 8.6 (H) 12/16/2020    LABA1C 7.4 09/23/2020     Lab Results   Component Value Date    CHOL 145 02/12/2020    TRIG 158 02/12/2020    HDL 50 12/16/2020    LDLCALC 59 02/12/2020     Lipid panel on 12/16/2020  Cholesterol 135  HDL 50  LDL 55  Triglyceride 150      Lab Results   Component Value Date    CREATININE 1.32 (H) 12/16/2020    BUN 18 12/16/2020     12/16/2020    K 4.3 12/16/2020     12/16/2020    CO2 24 12/16/2020     CrCl calculated at 66ml/min      Lab Results   Component Value Date    ALT 26 12/16/2020       Weight:  Wt Readings from Last 3 Encounters:   02/10/21 216 lb (98 kg)   01/06/21 214 lb 3.2 oz (97.2 kg)   12/16/20 209 lb 1.6 oz (94.8 kg)       Current medications:  Prior to Admission medications    Medication Sig Start Date End Date Taking?  Authorizing Provider   Dulaglutide (TRULICITY) 3 PC/0.7NG SOPN Inject 3 mg into the skin once a week on Wednesdays 12/17/20  Yes Zander Saucedo brimonidine (ALPHAGAN) 0.2 % ophthalmic solution Place 1 drop into both eyes 2 times daily 12/10/20  Yes Historical Provider, MD   nitroGLYCERIN (NITROSTAT) 0.4 MG SL tablet Place 0.4 mg under the tongue every 5 minutes as needed for Chest pain up to max of 3 total doses. If no relief after 1 dose, call 911.    Yes Historical Provider, MD   insulin glargine (LANTUS SOLOSTAR) 100 UNIT/ML injection pen Inject 48 Units into the skin nightly  Patient taking differently: Inject 55 Units into the skin nightly  10/7/20  Yes Soco Santana   metFORMIN (GLUCOPHAGE XR) 500 MG extended release tablet Take 2 tablets by mouth 2 times daily (with meals) 9/9/20  Yes Soco Santana   albuterol sulfate  (90 Base) MCG/ACT inhaler Inhale 2 puffs into the lungs every 6 hours as needed for Wheezing   Yes Historical Provider, MD   Melatonin 5 MG CAPS Take 1 capsule by mouth nightly as needed (sleep)   Yes Historical Provider, MD   tiotropium (SPIRIVA RESPIMAT) 2.5 MCG/ACT AERS inhaler Inhale 2 puffs into the lungs daily   Yes Historical Provider, MD   escitalopram (LEXAPRO) 20 MG tablet Take 20 mg by mouth daily    Yes Historical Provider, MD   aspirin 81 MG chewable tablet Take 81 mg by mouth   Yes Historical Provider, MD   metoprolol succinate (TOPROL XL) 50 MG extended release tablet Take 50 mg by mouth daily  1/12/18  Yes Historical Provider, MD   budesonide-formoterol (SYMBICORT) 160-4.5 MCG/ACT AERO 2 puffs twice a day 5/1/18  Yes Historical Provider, MD   Cholecalciferol (VITAMIN D3) 2000 units CAPS Take by mouth daily   Yes Historical Provider, MD   losartan (COZAAR) 50 MG tablet Take 50 mg by mouth daily  10/25/17  Yes Historical Provider, MD   brexpiprazole (REXULTI) 4 MG TABS tablet Take 2 mg by mouth daily    Yes Historical Provider, MD   rosuvastatin (CRESTOR) 40 MG tablet Take 40 mg by mouth every evening   Yes Historical Provider, MD montelukast (SINGULAIR) 10 MG tablet Take 10 mg by mouth nightly   Yes Historical Provider, MD   pantoprazole (PROTONIX) 40 MG tablet Take 1 tablet by mouth daily 7/20/16  Yes Mehdi Majano PA-C   loratadine (CLARITIN) 10 MG tablet Take 10 mg by mouth daily as needed (allergies)    Yes Historical Provider, MD   albuterol (PROVENTIL) (2.5 MG/3ML) 0.083% nebulizer solution Take 2.5 mg by nebulization every 6 hours as needed for Wheezing. Yes Historical Provider, MD   Lancets MISC Use to check blood sugar 3-4 times daily (before meals and at bedtime) and as needed for symptoms. 10/21/20   [facility] [employer]   Insulin Pen Needle 32G X 4 MM MISC Use to inject Trulicity once a week on Wednesdays and Lantus once a day. 9/9/20   [employer]   blood glucose monitor strips Test 3-4 times a day & as needed for symptoms of irregular blood glucose. Dispense sufficient amount for indicated testing frequency plus additional to accommodate PRN testing needs.  9/9/20   [email or web address]   FLUARIX QUADRIVALENT 0.5 ML injection inject 0.5 milliliter intramuscularly 8/4/17   Historical Provider, MD       Immunizations:   Most Recent Immunizations   Administered Date(s) Administered    Influenza Virus Vaccine 11/20/2015    Pneumococcal Conjugate 13-valent ([place]) 11/20/2015       Home Blood Glucose Results:     Blood glucose trends noted:      Date  Fasting   Before lunch   Before dinner  Bedtime  1/11     132  1/12  133   167   135  1/13  113   147   191  1/14  90   85   105  1/15  97   164   126  1/16  94   82   202  1/17  163   79     1/18  124  1/19  127   165  1/20  98   119  1/21  195   219   153 (ate 10:35pm)  1/22  203  1/23  130   90   132  1/24  160   214   Ate dinner around 11pm, went to bed  1/25   205   130  1/26  149   143  1/27  165   121   142  1/28  98     Sick  1/29  100  1/30  174   215  1/31  101      91 (1213 AM ate dinner, took Lantus and went to bed)  2/1  186   89   105 Metformin about 3 years ago with some diarrhea. Unsure if regular or extended release. Tolerating ER well. SGLT2 Ileana Mack) frequent yeast infections. Recent exacerbations / new problems:       Last office dictation reviewed: yes        type 2 DM under improving control as evidenced by A1C of 8.0 on 12/28/2020    Plan     Increase Insulin glargine (Lantus) to 57 Units subQ each evening. Blood sugars continue to worsen    Pt to call the clinic with blood sugars <70 or >200    Continue Dulaglutide (Trulicity) 3mg subQ once weekly on Wednesdays. Continue Metformin XR 1000mg in the morning and 1000 mg XR in the evening    Check blood sugars at least three times daily and document in sugar log. Patient to reece down whenever she eats something she feels is a treat or splurg. Encouraged her to write down what happened the meal prior for any blood sugars below 70 or above 200. Bring in glucose log to every appt. Use exercise bike at least three days a week for 5-10 minutes at a time. Start small and increase    Physician Follow-up:  Every 3 months     Medication Management Follow-up:   Diabetes Service   Wanted patient to follow up in 2 weeks, but with her schedule and clinic schedule appointment made for 3 weeks.     Electronically signed by Lady Lopez, [facility] on 2/10/2021 at 11:17 AM     CLINICAL PHARMACY CONSULT: MED RECONCILIATION/REVIEW [place]  22. Tracking Only    PHSO: No  Total # of Interventions Recommended: 1  - Increased Dose #: 1  Total Interventions Accepted: 1  Time Spent (min): [address], PharmD

## 2021-03-02 ENCOUNTER — TELEPHONE (OUTPATIENT)
Dept: PHARMACY | Age: 66
End: 2021-03-02

## 2021-03-03 ENCOUNTER — HOSPITAL ENCOUNTER (OUTPATIENT)
Dept: PHARMACY | Age: 66
Setting detail: THERAPIES SERIES
Discharge: HOME | End: 2021-03-03
Payer: COMMERCIAL

## 2021-03-03 VITALS — BODY MASS INDEX: 35.36 KG/M2 | WEIGHT: 212.5 LBS | TEMPERATURE: 97.4 F

## 2021-03-03 PROCEDURE — 99213 OFFICE O/P EST LOW 20 MIN: CPT

## 2021-03-03 RX ORDER — DORZOLAMIDE HCL 20 MG/ML
1 SOLUTION/ DROPS OPHTHALMIC 2 TIMES DAILY
COMMUNITY

## 2021-03-03 NOTE — PROGRESS NOTES
Diabetic Medication Management   [address] [place]. [place], [address] [facility]  Phone: [phone]  Fax: [phone]    NAME: Natty [facility] RECORD NUMBER:  [id number]  AGE: 60 y.o. GENDER: female  : 1955  EPISODE DATE:  3/3/2021       Ms. Ann Sorenson was referred to [facility] by Dr. Yocasta Andrade, Special instructions include: follow up every 3 months with physician in office     Goals per referral:   Fasting blood glucose:   Peak postprandial glucose: < 180  A1C: < 7%    Other goals:  Blood pressure goal: 130/80    Weight loss goal (~10%): Target weight  200 to be reached by date: Mar 2020 (3-6 months)  Physical Activity goal:    15 minutes 3 times per week to be reached by date: Mar 2020 (3-6 months)  Smoking Cessation   Quit Date: never smoked  Cholesterol at target:Yes    Date: Dec 2020  Annual eye exam:    Date: Dec 9th, 2020  Comprehensive annual foot exam:   Date: 19    Annual urine Albumin and serum creatinine:   Date: today 2020 SrCr 1.32 mg/dL; microalbumin 222.8 mg/L    Patient Specific Goals:  1. Lower blood sugars  2. Lower A1C    Subjective   Ms. Ann Sorenson is a 60 y.o. female here for the Diabetes Service for self-management education, medication review including over the counter medications and herbal products, overall wellbeing assessment, transition of care and any needed adjustments with updates and recommendations communicated to the referring physician. Patient's name and  verified. Patient visit in person in office. Patient Findings:  Patient more SOB yesterday and today. SOB with activity but not at rest.  Inhalers are helping per patient. Saw pulmonologist yesterday and had CXR which was normal.  Has orders to have pulmonary function tests and sleep apnea testing. Patient has next appt with Dr. Yocasta Andrade (endocrinologist) on .      Patient has been compliant with diabetes medications other than falling asleep and forgetting to take evening Lantus. Patient injecting mainly in abdomen. States has at times injecting into her thigh. Discussed different absorption based on location of injection and encouraged patient to use abdomen consistently. Patient has used the exercise bike some but admits not as she should - used about 3 times in last few weeks. Did have a lot of activity with cleaning/organizing on Monday 3/1. Encouraged regular use of exercise bike. States bike is a bit intimidating and makes her think of everything on her list she needs to do. Asked patient to think of bike as a way to relax. Patient states bike is not by TV but will have goal to move bike by tv so she can watch while she rides. Weight is down about 3.5lbs today from last visit. Patient admits to skipping meals still as busy. Encouraged her to not skip meals and get glucerna shakes or snack bars as a meal replacement if unable to eat. No hypoglycemia since last visit.        [x]  Missed doses   []  Emergency Room Visit    []  Medication changes  []  Hospitalization   [x]  Diet changes   []  Acute illness   []  Activity changes      Symptoms of hypoglycemia -     [x]  None   []  Shakiness    []  Lightheadedness or dizziness   []  Confusion      []  Sweating   []  Other       Symptoms of hyperglycemia   [x]  None   []  Frequent urination    []  Increased thirst   []  Other    Medication adverse reactions   [x]  None   []  Diarrhea / Nausea / Vomiting / Constipation / flatulence   []  Hypertension   []  Peripheral edema     []  Signs of infection   []  Headache   []  Vision changes   []  Increased cholesterol    []  Weight gain   []  Change in renal function   []  Increased potassium  []  Other  Comments:      If recent hospital admission / ED visit, was this related to Diabetes No:hypotension Discharge date July 2020    Objective     PMHx:    Past Medical History:   Diagnosis Date    Anxiety  Asthma     Common cold     COPD (chronic obstructive pulmonary disease) (HCC)     Depression     Diabetes mellitus (HCC)     Emphysema     Fatty liver     H/O: pneumonia FEB. 2014    Hiatal hernia     Hyperlipidemia     Hypertension     Mitral valve prolapse     Psychosis (HCC)     SOB (shortness of breath) on exertion     Spinal headache     Stroke ([facility] [address] 2013    Mild Lt side    Upper respiratory symptom        Social History:    Social History     Tobacco Use    Smoking status: Passive Smoke Exposure - Never Smoker    Smokeless tobacco: Never Used    Tobacco comment: socially smoked when playing cards   Substance Use Topics    Alcohol use: No     Alcohol/week: 0.0 standard drinks     Comment: socially       Pertinent Labs:    Lab Results   Component Value Date    LABA1C 8 12/28/2020    LABA1C 8.6 (H) 12/16/2020    LABA1C 7.4 09/23/2020     Lab Results   Component Value Date    CHOL 145 02/12/2020    TRIG 158 02/12/2020    HDL 50 12/16/2020    LDLCALC 59 02/12/2020     Lipid panel on 12/16/2020  Cholesterol 135  HDL 50  LDL 55  Triglyceride 150      Lab Results   Component Value Date    CREATININE 1.32 (H) 12/16/2020    BUN 18 12/16/2020     12/16/2020    K 4.3 12/16/2020     12/16/2020    CO2 24 12/16/2020     CrCl calculated at 66ml/min      Lab Results   Component Value Date    ALT 26 12/16/2020       Weight:  Wt Readings from Last 3 Encounters:   03/03/21 212 lb 8 oz (96.4 kg)   02/10/21 216 lb (98 kg)   01/06/21 214 lb 3.2 oz (97.2 kg)       Current medications:  Prior to Admission medications    Medication Sig Start Date End Date Taking?  Authorizing Provider   dorzolamide (TRUSOPT) 2 % ophthalmic solution Place 1 drop into both eyes 2 times daily   Yes Historical Provider, MD   Dulaglutide (TRULICITY) 3 TF/4.5IG SOPN Inject 3 mg into the skin once a week on Wednesdays 12/17/20  Yes Santhosh Santana   brimonidine (ALPHAGAN) 0.2 % ophthalmic solution Place 1 drop into both eyes 2 times daily 12/10/20  Yes Historical Provider, MD   Lancets MISC Use to check blood sugar 3-4 times daily (before meals and at bedtime) and as needed for symptoms. 10/21/20  Yes Junior Stefanie Santana   insulin glargine (LANTUS SOLOSTAR) 100 UNIT/ML injection pen Inject 48 Units into the skin nightly  Patient taking differently: Inject 60 Units into the skin nightly  10/7/20  Yes Junior Stefanie Santana   Insulin Pen Needle 32G X 4 MM MISC Use to inject Trulicity once a week on Wednesdays and Lantus once a day. 9/9/20  Yes Junior Stefanie Santana   metFORMIN (GLUCOPHAGE XR) 500 MG extended release tablet Take 2 tablets by mouth 2 times daily (with meals) 9/9/20  Yes [facility] [place]   blood glucose monitor strips Test 3-4 times a day & as needed for symptoms of irregular blood glucose. Dispense sufficient amount for indicated testing frequency plus additional to accommodate PRN testing needs. 9/9/20  Yes Junior Stefanie Santana   albuterol sulfate  (90 Base) MCG/ACT inhaler Inhale 2 puffs into the lungs every 6 hours as needed for Wheezing   Yes Historical Provider, MD   aspirin 81 MG chewable tablet Take 81 mg by mouth   Yes Historical Provider, MD   albuterol (PROVENTIL) (2.5 MG/3ML) 0.083% nebulizer solution Take 2.5 mg by nebulization every 6 hours as needed for Wheezing. Yes Historical Provider, MD   nitroGLYCERIN (NITROSTAT) 0.4 MG SL tablet Place 0.4 mg under the tongue every 5 minutes as needed for Chest pain up to max of 3 total doses. If no relief after 1 dose, call 911.     Historical Provider, MD   Melatonin 5 MG CAPS Take 1 capsule by mouth nightly as needed (sleep)    Historical Provider, MD   tiotropium (SPIRIVA RESPIMAT) 2.5 MCG/ACT AERS inhaler Inhale 2 puffs into the lungs daily    Historical Provider, MD   escitalopram (LEXAPRO) 20 MG tablet Take 20 mg by mouth daily     Historical Provider, MD   metoprolol succinate (TOPROL XL) 50 MG extended release tablet Take 50 mg by mouth daily  1/12/18   Historical Provider, MD   budesonide-formoterol (SYMBICORT) 160-4.5 MCG/ACT AERO 2 puffs twice a day 5/1/18   Historical Provider, MD   Cholecalciferol (VITAMIN D3) 2000 units CAPS Take by mouth daily    Historical Provider, MD   FLUARIX QUADRIVALENT 0.5 ML injection inject 0.5 milliliter intramuscularly 8/4/17   Historical Provider, MD   losartan (COZAAR) 50 MG tablet Take 50 mg by mouth daily  10/25/17   Historical Provider, MD   brexpiprazole (REXULTI) 4 MG TABS tablet Take 2 mg by mouth daily     Historical Provider, MD   rosuvastatin (CRESTOR) 40 MG tablet Take 40 mg by mouth every evening    Historical Provider, MD   montelukast (SINGULAIR) 10 MG tablet Take 10 mg by mouth nightly    Historical Provider, MD   pantoprazole (PROTONIX) 40 MG tablet Take 1 tablet by mouth daily 7/20/16   Philomena Barajas PA-C   loratadine (CLARITIN) 10 MG tablet Take 10 mg by mouth daily as needed (allergies)     Historical Provider, MD       Immunizations:   Most Recent Immunizations   Administered Date(s) Administered    Influenza Virus Vaccine 11/20/2015    Pneumococcal Conjugate 13-valent (Chaneta Orestes) 11/20/2015       Home Blood Glucose Results:     Blood glucose trends noted:      Date  Fasting   Before lunch   Before dinner  Bedtime  3/3  132  3/2     107   155  3/1  299   274   244  2/28  161   252   305  2/27  184   134   148  2/26  133      110  2/25  122   130   162  2/24  169   189   125  2/23     112  2/22  87   174   155  2/21  124   106   156  2/20  132   184   174  2/19  128   124  2/18  103   137   195  2/17  157   125   132  2/16  188      218  2/15     93   216  2/14  84   94   209  2/13  157   167  2/12  136  2/11     72   124        Patient's blood sugars have overall running higher despite increase in insulin. Possibly due to diet as patient still not consistent with diet and still missing meals.        Assessment     A1c at goal: No and increasing  8.0 on 12/28/2020 Blood Pressure at goal: Yes 128/68 on 12/28/20  Weight at goal: No   Physical activity: 3 times in last few weeks. Physical activity at goal: no  Smoking Cessation: Yes  Cholesterol at target: Yes: LDL 55  Annual eye exam completed: Yes 12/9/2020  Comprehensive Foot Exam Completed: Yes  Annual urine albumin and serum creatinine: Yes    Statin: Yes    Appropriate?: Yes  Changes made:     ACE/ARB: Yes  Appropriate?: Yes  Changes made:     Aspirin: Yes  Appropriate?: Yes  Changes made:     Eating patterns:    []  My plate    []  Mediterranean diet   []  Low sodium   []  DASH diet   []  Portion control   []  Reduced calorie    []  Fast food / Restaurant  []  High glycemic index foods   []  Sugary beverages   [x]  Other     Comment: see above    Current Medications Affecting Diabetes:  Dulaglutide 3mg subQ once weekly on Wednesdays  Insulin glargine 57 units subQ each evening  Metformin XR 1000mg in the morning and 1000 mg XR in the evening       Compliant: Yes for the most part. Admits to missing a dose of Lantus once since last appt due to falling asleep. Barriers to medication therapy: No     Medications attempted in the past:  Metformin about 3 years ago with some diarrhea. Unsure if regular or extended release. Tolerating ER well. SGLT2 Jackie Hudson) frequent yeast infections. Recent exacerbations / new problems:       Last office dictation reviewed: yes        type 2 DM under improving control as evidenced by A1C of 8.0 on 12/28/2020    Plan     Increase Insulin glargine (Lantus) to 60 Units subQ each evening. Blood sugars continue to worsen    Pt to call the clinic with blood sugars <70 or >200    Continue Dulaglutide (Trulicity) 3mg subQ once weekly on Wednesdays. Continue Metformin XR 1000mg in the morning and 1000 mg XR in the evening    Check blood sugars at least three times daily and document in sugar log. Patient to reece down whenever she eats something she feels is a treat or splurg. Encouraged her to write down what happened the meal prior for any blood sugars below 70 or above 200. Bring in glucose log to every appt. Use exercise bike with goal of 10-15 minutes or more three or more days a week while she says her morning prayers. Patient to get some glucerna shakes and/or snack bars to have instead of missing meals. Physician Follow-up:  Every 3 months     Medication Management Follow-up:   Diabetes Service   In four weeks. Patient to see endocrinologist in 2 weeks and will see her 2 weeks after that. Electronically signed by Meryl Benz RPH on 3/3/2021 at 11:49 AM     CLINICAL PHARMACY CONSULT: MED RECONCILIATION/REVIEW ADDENDUM    For Pharmacy Admin Tracking Only    PHSO: No  Total # of Interventions Recommended: 2  - Increased Dose #: 1  - Updated Order #: 1 Updated Order Reason(s):  Other  Total Interventions Accepted: 2  Time Spent (min): 45    Satish Sanches, PharmD  [address]

## 2021-03-16 LAB — HBA1C MFR BLD: 7.7 %

## 2021-03-30 ENCOUNTER — TELEPHONE (OUTPATIENT)
Dept: PHARMACY | Age: 66
End: 2021-03-30

## 2021-03-30 NOTE — TELEPHONE ENCOUNTER
Spoke with patient for COVID 19 screening secondary to upcoming appointment tomorrow . At this time patient denies symptoms, recent (previous 14 days) positive covid test, recent travel and exposure. Patient will report to [facility] at scheduled time. Patient educated to call physician or go to ER with respiratory symptoms or fever and to not present to appointment if symptomatic. Will coordinate for next appointment accordingly.      Davdi De León, PharmD Candidate

## 2021-03-31 ENCOUNTER — HOSPITAL ENCOUNTER (OUTPATIENT)
Dept: PHARMACY | Age: 66
Setting detail: THERAPIES SERIES
Discharge: HOME | End: 2021-03-31
Payer: COMMERCIAL

## 2021-03-31 VITALS — WEIGHT: 216.5 LBS | BODY MASS INDEX: 36.03 KG/M2 | TEMPERATURE: 96.8 F

## 2021-03-31 PROCEDURE — 99212 OFFICE O/P EST SF 10 MIN: CPT

## 2021-03-31 NOTE — PROGRESS NOTES
with no changes due to controlled blood sugars. A1c done in office reported at 7.7 per patient. Diabetic foot exam performed by endocrinolgist.    Patient reports missing no doses of insulin or metformin or trulicity. Was riding exercise bike for about 30 min almost every day but had Dizzy spell with some confusion per patient on 3/6. Spoke to son and was confused but resolved on its own with no recurrence. Patient has not exercised since then as was concerned exercise caused. Encouraged patient to resume exercise but work up if needed while praying rosery. Patient will start with 15-20 min at a time and increase as tolerated. Will call physician if she has another episode. Weight is up about 4 lbs. Patient admits to eating more overall as she has been hungry and also eating in middle of night. Patient is eating another meal at times in middle of night. Encouraged her to avoid eating in middle of night but if she has to to stick with healthy options. Suggested making it a snack rather than a full meal.  Patient will try to have veges or glucerna snack bar if necessary. Bought glucerna shakes and snack bars so not skipping meals as much. No hypoglycemia since last visit.        []  Missed doses   []  Emergency Room Visit    []  Medication changes  []  Hospitalization   [x]  Diet changes   []  Acute illness   []  Activity changes      Symptoms of hypoglycemia -     [x]  None   []  Shakiness    []  Lightheadedness or dizziness   []  Confusion      []  Sweating   []  Other       Symptoms of hyperglycemia   [x]  None   []  Frequent urination    []  Increased thirst   []  Other    Medication adverse reactions   [x]  None   []  Diarrhea / Nausea / Vomiting / Constipation / flatulence   []  Hypertension   []  Peripheral edema     []  Signs of infection   []  Headache   []  Vision changes   []  Increased cholesterol    []  Weight gain   []  Change in renal function   []  Increased potassium  [] Other  Comments:      If recent hospital admission / ED visit, was this related to Diabetes No:hypotension Discharge date July 2020    Objective     PMHx:    Past Medical History:   Diagnosis Date    Anxiety     Asthma     Common cold     [facility]ch[facility] disease) ([facility] [address]     Depression     Diabetes mellitus ([facility]     Emphysema     Fatty liver     H/O: pneumonia FEB. 2014    Hiatal hernia     Hyperlipidemia     Hypertension     Mitral valve prolapse     Psychosis (HCC)     SOB (shortness of breath) on exertion     Spinal headache     Stroke ([facility] [address] 2013    Mild Lt side    Upper respiratory symptom        Social History:    Social History     Tobacco Use    Smoking status: Passive Smoke Exposure - Never Smoker    Smokeless tobacco: Never Used    Tobacco comment: socially smoked when playing cards   Substance Use Topics    Alcohol use: No     Alcohol/week: 0.0 standard drinks     Comment: socially       Pertinent Labs:    Lab Results   Component Value Date    LABA1C 7.7 03/16/2021    LABA1C 8 12/28/2020    LABA1C 8.6 (H) 12/16/2020     Lab Results   Component Value Date    CHOL 145 02/12/2020    TRIG 158 02/12/2020    HDL 50 12/16/2020    LDLCALC 59 02/12/2020     Lipid panel on 12/16/2020  Cholesterol 135  HDL 50  LDL 55  Triglyceride 150      Lab Results   Component Value Date    CREATININE 1.32 (H) 12/16/2020    BUN 18 12/16/2020     12/16/2020    K 4.3 12/16/2020     12/16/2020    CO2 24 12/16/2020     CrCl calculated at 66ml/min      Lab Results   Component Value Date    ALT 26 12/16/2020       Weight:  Wt Readings from Last 3 Encounters:   03/31/21 216 lb 8 oz (98.2 kg)   03/03/21 212 lb 8 oz (96.4 kg)   02/10/21 216 lb (98 kg)       Current medications:  Prior to Admission medications    Medication Sig Start Date End Date Taking?  Authorizing Provider   dorzolamide (TRUSOPT) 2 % ophthalmic solution Place 1 drop into both eyes 2 times daily    Historical Provider, extended release tablet Take 50 mg by mouth daily  1/12/18   Historical Provider, MD   budesonide-formoterol (SYMBICORT) 160-4.5 MCG/ACT AERO 2 puffs twice a day 5/1/18   Historical Provider, MD   Cholecalciferol (VITAMIN D3) 2000 units CAPS Take by mouth daily    Historical Provider, MD   FLUARIX QUADRIVALENT 0.5 ML injection inject 0.5 milliliter intramuscularly 8/4/17   Historical Provider, MD   losartan (COZAAR) 50 MG tablet Take 50 mg by mouth daily  10/25/17   Historical Provider, MD   brexpiprazole (REXULTI) 4 MG TABS tablet Take 2 mg by mouth daily     Historical Provider, MD   rosuvastatin (CRESTOR) 40 MG tablet Take 40 mg by mouth every evening    Historical Provider, MD   montelukast (SINGULAIR) 10 MG tablet Take 10 mg by mouth nightly    Historical Provider, MD   pantoprazole (PROTONIX) 40 MG tablet Take 1 tablet by mouth daily 7/20/16   Gertrudis Clark PA-C   loratadine (CLARITIN) 10 MG tablet Take 10 mg by mouth daily as needed (allergies)     Historical Provider, MD   albuterol (PROVENTIL) (2.5 MG/3ML) 0.083% nebulizer solution Take 2.5 mg by nebulization every 6 hours as needed for Wheezing. Historical Provider, MD       Immunizations:   Most Recent Immunizations   Administered Date(s) Administered    Influenza Virus Vaccine 11/20/2015    Pneumococcal Conjugate 13-valent (Sharran Leisure) 11/20/2015       Home Blood Glucose Results:     Blood glucose trends noted:      Date  Fasting   Before lunch   Before dinner     Bedtime  3/31  151  3/30  108      104  3/29  210   164   157  3/28  103  3/27  131   194   120  3/26  100      107  3/25  145   251     3/24  248   380  3/23  165      115  3/22  143  3/21  144      148  3/20     102   125  3/19  124   101   240  3/18  115   110   200  3/17  219   103  3/16  115      163  3/15  167   135   241  3/14  98   94  3/13  128   105   145            Patients blood sugars overall under relative control when patient controls diet it appears.  When patient splurgs or eats in middle of night blood sugar elevates. Patient thinks it is also because the last month she has had a lot of appts with a lot of stress due to being busy as well as had a lot of people around her house. Encouraged her to watch diet and plan ahead. Patient states next month is not as busy and thinks she will watch her diet more. Indicated that if blood sugars do not come down may need to increase insulin slightly. Assessment     A1c at goal: No but decreasing  7.7 on 3/16/21  Blood Pressure at goal: Yes 124/76 on 3/16/21  Weight at goal: No   Physical activity: not consistently. Physical activity at goal: no  Smoking Cessation: Yes  Cholesterol at target: Yes: LDL 55  Annual eye exam completed: Yes 12/9/2020  Comprehensive Foot Exam Completed: Yes  Annual urine albumin and serum creatinine: Yes    Statin: Yes    Appropriate?: Yes  Changes made:     ACE/ARB: Yes  Appropriate?: Yes  Changes made:     Aspirin: Yes  Appropriate?: Yes  Changes made:     Eating patterns:    []  My plate    []  Mediterranean diet   []  Low sodium   []  DASH diet   []  Portion control   []  Reduced calorie    []  Fast food / Restaurant  []  High glycemic index foods   []  Sugary beverages   [x]  Other     Comment: see above    Current Medications Affecting Diabetes:  Dulaglutide 3mg subQ once weekly on Wednesdays  Insulin glargine 60 units subQ each evening  Metformin XR 1000mg in the morning and 1000 mg XR in the evening       Compliant: Yes     Barriers to medication therapy: No     Medications attempted in the past:  Metformin about 3 years ago with some diarrhea. Unsure if regular or extended release. Tolerating ER well. SGLT2 Ramon Freedman) frequent yeast infections. Recent exacerbations / new problems:       Last office dictation reviewed: yes        type 2 DM under improving control as evidenced by A1C of 7.7 on 3/16/2021    Plan   Continue Insulin glargine (Lantus)  60 Units subQ each evening.      Pt to call the clinic with blood sugars <70 or >200    Continue Dulaglutide (Trulicity) 3mg subQ once weekly on Wednesdays. Continue Metformin XR 1000mg in the morning and 1000 mg XR in the evening    Check blood sugars at least three times daily and document in sugar log. Patient to reece down whenever she eats something she feels is a treat or splurg. Encouraged her to write down what happened the meal prior for any blood sugars below 70 or above 200. Bring in glucose log to every appt. Use exercise bike with goal of 15-20 minutes most days of the week while she says her morning prayers. Avoid eating in middle of night and if absolutely necessary eat a snack not a meal and have a healthy snack in mind. Physician Follow-up:  Every 3 months     Medication Management Follow-up:   Diabetes Service   In 1 month     Electronically signed by Tinnie Kayser, RP on 3/31/2021 at 3:06 PM     CLINICAL PHARMACY CONSULT: MED [facility]ONCIL[facility]ATION/[facility] Tr[facility] Only    PHSO: No  Total # of Interventions Recommended: 2  - Increased Dose #: 1  - Updated Order #: 1 Updated Order Reason(s):  Other  Total Interventions Accepted: 2  Time Spent (min): 45    Satish Sanches, PharmD  [address]

## 2021-04-16 ENCOUNTER — TELEPHONE (OUTPATIENT)
Dept: PHARMACY | Age: 66
End: 2021-04-16

## 2021-04-16 NOTE — TELEPHONE ENCOUNTER
Patient complains of increasing Blood Sugars in the upper 200's and lower 300's  Lantus increased to 65 units  Next appt with our clinic on May 5th,2021

## 2021-05-05 ENCOUNTER — HOSPITAL ENCOUNTER (OUTPATIENT)
Dept: PHARMACY | Age: 66
Setting detail: THERAPIES SERIES
Discharge: HOME | End: 2021-05-05
Payer: COMMERCIAL

## 2021-05-05 VITALS — BODY MASS INDEX: 36.38 KG/M2 | WEIGHT: 218.6 LBS

## 2021-05-05 PROCEDURE — 99212 OFFICE O/P EST SF 10 MIN: CPT

## 2021-05-05 NOTE — PROGRESS NOTES
Diabetic Medication Management   [address]    [address] [place], [address] [address] [facility]  Phone: [phone]  Fax: [phone]    NAME: [facility] RECORD NUMBER:  [id number]  AGE: 60 y.o. GENDER: female  : 1955  EPISODE DATE:  2021       Ms. Terry Noriega was referred to [facility] Medication Management Services by Dr. Tess Ivey, Special instructions include: follow up every 3 months with physician in office     Goals per referral:   Fasting blood glucose:   Peak postprandial glucose: < 180  A1C: < 7%    Other goals:  Blood pressure goal: 130/80    Weight loss goal (~10%): Target weight  200 to be reached by date: 2020 (3-6 months)  Physical Activity goal:    15 minutes 3 times per week to be reached by date: 2020 (3-6 months)  Smoking Cessation   Quit Date: never smoked  Cholesterol at target:Yes    Date: Dec 2020  Annual eye exam:    Date: 2021  Comprehensive annual foot exam:   Date: 3/16/21    Annual urine Albumin and serum creatinine:   Date: today 2020 SrCr 1.32 mg/dL; microalbumin 222.8 mg/L    Patient Specific Goals:  1. Lower blood sugars  2. Lower A1C    Subjective   Ms. Terry Noriega is a 60 y.o. female here for the Diabetes Service for self-management education, medication review including over the counter medications and herbal products, overall wellbeing assessment, transition of care and any needed adjustments with updates and recommendations communicated to the referring physician. Patient's name and  verified. Patient visit in person in office. Patient Findings:  Patient reports calling in to our service on  due to elevated blood sugars and receiving instructions to increase insulin glargine to 65 units once a day. She started this and has continued this dose every day since 21. Patient is having oatmeal for breakfast every morning - not flavored kind - and is happy with this. Plans to continue. Patient has sleep apnea study on 5/17 and PFTs at [facility] on 6/9 and will have covid testing on 6/5 in prep for testing. Patient states she is using Symbicort and Spiriva every day but also using her albuterol MDI with symbicort regularly. Educated patient that albuterol is a rescue inhaler and only to use as needed for SOB. Patient acknowledges understanding. Patient saw Dr. Fernandez Kyle (endocrinologist) on March 16th. Patient states appt went well with no changes due to controlled blood sugars. A1c done in office reported at 7.7 per patient. Diabetic foot exam performed by endocrinolgist.  Next appt is 6/15/21. Patient reports missing no doses of insulin or metformin or trulicity that she knew of but admits one day she couldn't remember if she took her metformin or not so did not take just in case she would take double dose. Patient does not use a pill box. Gave pill box to patient and described how to use it. Patient states she was doing well exercising for awhile but got dizzy and got scare. Patient has not exercised for last two weeks. Patient plans to resume at 15 min every other day. Patient has discussed dizzy spells with physician and hopes sleep study will show what is going on. Weight is up about 2 lbs. Patient eating oatmeal for breakfast and has not been eating in the middle of the night. Has been freezing fruit to eat as a snack (watermelon/pineapple/muskmelon/grapes). Patient also admits sometimes not eating supper and substituting fruit for supper. States fasting blood sugars have been good. For lunch has been eating a bit later. For lunch patient is eating example of 2 hotdogs with bread or buns with baked beans or chips or soup or bagel with some chicken on it. Patient reports quite a bit of stress due to recent issue with son and the law as well as with scheduling testing. In past we have seen patient's blood sugar increase with significant stress.   Stress level seems to be more stable / normal currently per patient. Bought glucerna shakes and snack bars so not skipping meals as much. No hypoglycemia since last visit. []  Missed doses   []  Emergency Room Visit    []  Medication changes  []  Hospitalization   [x]  Diet changes   []  Acute illness   []  Activity changes      Symptoms of hypoglycemia -     [x]  None   []  Shakiness    []  Lightheadedness or dizziness   []  Confusion      []  Sweating   []  Other       Symptoms of hyperglycemia   [x]  None   []  Frequent urination    []  Increased thirst   []  Other    Medication adverse reactions   [x]  None   []  Diarrhea / Nausea / Vomiting / Constipation / flatulence   []  Hypertension   []  Peripheral edema     []  Signs of infection   []  Headache   []  Vision changes   []  Increased cholesterol    []  Weight gain   []  Change in renal function   []  Increased potassium  []  Other  Comments:      If recent hospital admission / ED visit, was this related to Diabetes No:hypotension Discharge date July 2020    Objective     PMHx:    Past Medical History:   Diagnosis Date    Anxiety     Asthma     Common cold     [facility] [address] [facility]     Depression     Diabetes mellitus ([facility] [address] [facility]     Emphysema     Fatty liver     H/O: pneumonia FEB. 2014    Hiatal hernia     Hyperlipidemia     Hypertension     Mitral valve prolapse     Psychosis (HCC)     SOB (shortness of breath) on exertion     Spinal headache     Stroke ([facility] [address] 2013    Mild Lt side    Upper respiratory symptom        Social History:    Social History     Tobacco Use    Smoking status: Passive Smoke Exposure - Never Smoker    Smokeless tobacco: Never Used    Tobacco comment: socially smoked when playing cards   Substance Use Topics    Alcohol use: No     Alcohol/week: 0.0 standard drinks     Comment: socially       Pertinent Labs:    Lab Results   Component Value Date    LABA1C 7.7 03/16/2021    LABA1C 8 12/28/2020    LABA1C 8.6 (H) 12/16/2020     Lab Results   Component Value Date    CHOL 145 02/12/2020    TRIG 158 02/12/2020    HDL 50 12/16/2020    LDLCALC 59 02/12/2020     Lipid panel on 12/16/2020  Cholesterol 135  HDL 50  LDL 55  Triglyceride 150      Lab Results   Component Value Date    CREATININE 1.32 (H) 12/16/2020    BUN 18 12/16/2020     12/16/2020    K 4.3 12/16/2020     12/16/2020    CO2 24 12/16/2020     CrCl calculated at 66ml/min      Lab Results   Component Value Date    ALT 26 12/16/2020       Weight:  Wt Readings from Last 3 Encounters:   05/05/21 218 lb 9.6 oz (99.2 kg)   03/31/21 216 lb 8 oz (98.2 kg)   03/03/21 212 lb 8 oz (96.4 kg)       Current medications:  Prior to Admission medications    Medication Sig Start Date End Date Taking? Authorizing Provider   dorzolamide (TRUSOPT) 2 % ophthalmic solution Place 1 drop into both eyes 2 times daily   Yes Historical Provider, MD   Dulaglutide (TRULICITY) 3 RE/2.0WY SOPN Inject 3 mg into the skin once a week on Wednesdays 12/17/20  Yes Santhosh Santana   brimonidine [facility]) 0.2 % ophthalmic solution Place 1 drop into both eyes 2 times daily 12/10/20  Yes Historical Provider, MD   nitroGLYCERIN (NITROSTAT) 0.4 MG SL tablet Place 0.4 mg under the tongue every 5 minutes as needed for Chest pain up to max of 3 total doses. If no relief after 1 dose, call 911. Yes Historical Provider, MD   Lancets MISC Use to check blood sugar 3-4 times daily (before meals and at bedtime) and as needed for symptoms. 10/21/20  Yes Verner Chamberlain Gonzales   insulin glargine (LANTUS SOLOSTAR) 100 UNIT/ML injection pen Inject 48 Units into the skin nightly  Patient taking differently: Inject 65 Units into the skin nightly  10/7/20  Yes Verner Chamberlain Gonzales   Insulin Pen Needle 32G X 4 MM MISC Use to inject Trulicity once a week on Wednesdays and Lantus once a day.  9/9/20  Yes Verner Chamberlain Gonzales   metFORMIN (GLUCOPHAGE XR) 500 MG extended release tablet Take 2 tablets by mouth 2 times daily (with meals) 9/9/20  Yes Mike Snyder   blood glucose monitor strips Test 3-4 times a day & as needed for symptoms of irregular blood glucose. Dispense sufficient amount for indicated testing frequency plus additional to accommodate PRN testing needs. 9/9/20  Yes Ashlee Santana   albuterol sulfate  (90 Base) MCG/ACT inhaler Inhale 2 puffs into the lungs every 6 hours as needed for Wheezing   Yes Historical Provider, MD   tiotropium (SPIRIVA RESPIMAT) 2.5 MCG/ACT AERS inhaler Inhale 2 puffs into the lungs daily   Yes Historical Provider, MD   escitalopram (LEXAPRO) 20 MG tablet Take 20 mg by mouth daily    Yes Historical Provider, MD   aspirin 81 MG chewable tablet Take 81 mg by mouth   Yes Historical Provider, MD   budesonide-formoterol (SYMBICORT) 160-4.5 MCG/ACT AERO 2 puffs twice a day 5/1/18  Yes Historical Provider, MD   losartan (COZAAR) 50 MG tablet Take 50 mg by mouth daily  10/25/17  Yes Historical Provider, MD   brexpiprazole (REXULTI) 4 MG TABS tablet Take 2 mg by mouth daily    Yes Historical Provider, MD   rosuvastatin (CRESTOR) 40 MG tablet Take 40 mg by mouth every evening   Yes Historical Provider, MD   montelukast (SINGULAIR) 10 MG tablet Take 10 mg by mouth nightly   Yes Historical Provider, MD   pantoprazole (PROTONIX) 40 MG tablet Take 1 tablet by mouth daily 7/20/16  Yes Ana Valdez PA-C   loratadine (CLARITIN) 10 MG tablet Take 10 mg by mouth daily as needed (allergies)    Yes Historical Provider, MD   albuterol (PROVENTIL) (2.5 MG/3ML) 0.083% nebulizer solution Take 2.5 mg by nebulization every 6 hours as needed for Wheezing.    Yes Historical Provider, MD   Melatonin 5 MG CAPS Take 1 capsule by mouth nightly as needed (sleep)  5/5/21  Historical Provider, MD   metoprolol succinate (TOPROL XL) 50 MG extended release tablet Take 50 mg by mouth daily  1/12/18   Historical Provider, MD Cholecalciferol (VITAMIN D3) 2000 units CAPS Take by mouth daily    Historical Provider, MD   FLUARIX QUADRIVALENT 0.5 ML injection inject 0.5 milliliter intramuscularly 8/4/17   Historical Provider, MD       Immunizations:   Most Recent Immunizations   Administered Date(s) Administered    Influenza Virus Vaccine 11/20/2015    Pneumococcal Conjugate 13-valent ([id number]) 11/20/2015       Home Blood Glucose Results:     Blood glucose trends noted:      Date  Fasting   Before lunch   Before dinner     Bedtime  5/5  125  5/4  121   123   161  5/3  90   123  5/2  86   92   154  5/1  112   161   196  4/30     110  4/29  240   127   157  4/28  125      226  4/27  101   101   173  4/26  107     4/25  106  4/24  117   111   95   4/23  149      200  4/22  131   213  4/21  96   196   139  4/20  153   113  4/19  180   31  4/18  113  4/17  164   191   152  4/16  152   113   113  4/15  248   154   294  4/14  329   276   372  4/13 4/12  319   185  4/11  161  4/10  136   116   113  4/9  248   174   114  4/8  177   182  4/7     112  4/6  258  4/5  229   162  4/4  102   134   138  4/3  184   153  4/2  168   158   172  4/1  211      101                  Can see where patient's blood sugars were not controlled but have improved since insulin glargine dose was increased to 65 units once a day. Fasting blood sugars and lunch time blood sugars better controlled than pre dinner blood glucose likely due to patient eating bulk of calories at lunch time. Discussed healthier options but overall patients blood sugars better controlled overall. Assessment     A1c at goal: No but decreasing  7.7 on 3/16/21  Blood Pressure at goal: Yes 124/76 on 3/16/21  Weight at goal: No   Physical activity: not consistently. Physical activity at goal: no  Smoking Cessation: Yes  Cholesterol at target: Yes: LDL 55  Annual eye exam completed: Yes April 2021  Comprehensive Foot Exam Completed:  Yes  Annual urine albumin and serum creatinine: Yes    Statin: Yes    Appropriate?: Yes  Changes made:     ACE/ARB: Yes  Appropriate?: Yes  Changes made:     Aspirin: Yes  Appropriate?: Yes  Changes made:     Eating patterns:    []  My plate    []  Mediterranean diet   []  Low sodium   []  DASH diet   []  Portion control   []  Reduced calorie    []  Fast food / Restaurant  []  High glycemic index foods   []  Sugary beverages   [x]  Other     Comment: see above    Current Medications Affecting Diabetes:  Dulaglutide 3mg subQ once weekly on Wednesdays  Insulin glargine 65 units subQ each evening  Metformin XR 1000mg in the morning and 1000 mg XR in the evening       Compliant: Yes     Barriers to medication therapy: No     Medications attempted in the past:  Metformin about 3 years ago with some diarrhea. Unsure if regular or extended release. Tolerating ER well. SGLT2 Braxton Son) frequent yeast infections. Recent exacerbations / new problems:       Last office dictation reviewed: yes        type 2 DM under improving control as evidenced by A1C of 7.7 on 3/16/2021    Plan   Continue Insulin glargine (Lantus)  65 Units subQ each evening. Pt to call the clinic with blood sugars <70 or >200    Continue Dulaglutide (Trulicity) 3mg subQ once weekly on Wednesdays. Continue Metformin XR 1000mg in the morning and 1000 mg XR in the evening    Check blood sugars at least three times daily and document in sugar log. Patient to reece down whenever she eats something she feels is a treat or splurg. Encouraged her to write down what happened the meal prior for any blood sugars below 70 or above 200. Bring in glucose log to every appt. Use exercise bike with goal of 15 minutes every other day as feels comfortable and not dizzy. Patient to use albuterol as needed rather than scheduled. Patient to let physician know if needs to use rescue inhaler more frequently. Use pill box for metformin and rexulti.     Physician Follow-up:  Every 3 months Medication Management Follow-up:   Diabetes Service   6 weeks (2 weeks after endocrinology)    Electronically signed by Jason Sánchez RPH on 2021 at 11:36 AM     For Pharmacy [address] in place: Yes    Recommendation Provided To: Patient/Caregiver: 4 via In person   Intervention Detail: Adherence Monitorin, Dose Adjustment: 1: reason: Therapy Optimization and Scheduled Appointment   Gap Closed?  No    Total # of Interventions Recommended: 4   Total # of Interventions Accepted: 4   Intervention Accepted By: Patient/Caregiver: 4   Time Spent (min): 45

## 2021-05-17 ENCOUNTER — HOSPITAL ENCOUNTER (OUTPATIENT)
Dept: SLEEP CENTER | Age: 66
Discharge: HOME | End: 2021-05-19
Payer: COMMERCIAL

## 2021-05-17 DIAGNOSIS — G47.33 OSA (OBSTRUCTIVE SLEEP APNEA): Primary | ICD-10-CM

## 2021-05-17 PROCEDURE — 95810 POLYSOM 6/> YRS 4/> PARAM: CPT

## 2021-05-18 VITALS
HEART RATE: 64 BPM | OXYGEN SATURATION: 98 % | RESPIRATION RATE: 14 BRPM | BODY MASS INDEX: 34.07 KG/M2 | WEIGHT: 212 LBS | HEIGHT: 66 IN

## 2021-05-18 ASSESSMENT — SLEEP AND FATIGUE QUESTIONNAIRES
HOW LIKELY ARE YOU TO NOD OFF OR FALL ASLEEP WHEN YOU ARE A PASSENGER IN A CAR FOR AN HOUR WITHOUT A BREAK: 1
HOW LIKELY ARE YOU TO NOD OFF OR FALL ASLEEP WHILE LYING DOWN TO REST IN THE AFTERNOON WHEN CIRCUMSTANCES PERMIT: 3
HOW LIKELY ARE YOU TO NOD OFF OR FALL ASLEEP WHILE SITTING AND TALKING TO SOMEONE: 0
HOW LIKELY ARE YOU TO NOD OFF OR FALL ASLEEP WHILE SITTING AND READING: 3
HOW LIKELY ARE YOU TO NOD OFF OR FALL ASLEEP IN A CAR, WHILE STOPPED FOR A FEW MINUTES IN TRAFFIC: 0
HOW LIKELY ARE YOU TO NOD OFF OR FALL ASLEEP WHILE SITTING QUIETLY AFTER LUNCH WITHOUT ALCOHOL: 2

## 2021-05-26 LAB — STATUS: NORMAL

## 2021-06-05 ENCOUNTER — HOSPITAL ENCOUNTER (OUTPATIENT)
Dept: LAB | Age: 66
Setting detail: SPECIMEN
Discharge: HOME | End: 2021-06-05
Payer: COMMERCIAL

## 2021-06-05 DIAGNOSIS — Z01.818 PREOP TESTING: Primary | ICD-10-CM

## 2021-06-05 PROCEDURE — U0003 INFECTIOUS AGENT DETECTION BY NUCLEIC ACID (DNA OR RNA); SEVERE ACUTE RESPIRATORY SYNDROME CORONAVIRUS 2 (SARS-COV-2) (CORONAVIRUS DISEASE [COVID-19]), AMPLIFIED PROBE TECHNIQUE, MAKING USE OF HIGH THROUGHPUT TECHNOLOGIES AS DESCRIBED BY CMS-2020-01-R: HCPCS

## 2021-06-05 PROCEDURE — U0005 INFEC AGEN DETEC AMPLI PROBE: HCPCS

## 2021-06-06 LAB
SARS-COV-2: NORMAL
SARS-COV-2: NOT DETECTED
SOURCE: NORMAL

## 2021-06-09 ENCOUNTER — HOSPITAL ENCOUNTER (OUTPATIENT)
Dept: NEUROLOGY | Age: 66
Discharge: HOME | End: 2021-06-09
Payer: COMMERCIAL

## 2021-06-09 DIAGNOSIS — J45.40 MODERATE PERSISTENT ASTHMA WITHOUT COMPLICATION: ICD-10-CM

## 2021-06-09 PROCEDURE — 94060 EVALUATION OF WHEEZING: CPT

## 2021-06-09 PROCEDURE — 6370000000 HC RX 637 (ALT 250 FOR IP): Performed by: INTERNAL MEDICINE

## 2021-06-09 PROCEDURE — 94729 DIFFUSING CAPACITY: CPT

## 2021-06-09 PROCEDURE — 94726 PLETHYSMOGRAPHY LUNG VOLUMES: CPT

## 2021-06-09 RX ORDER — ALBUTEROL SULFATE 90 UG/1
2 AEROSOL, METERED RESPIRATORY (INHALATION) ONCE
Status: COMPLETED | OUTPATIENT
Start: 2021-06-09 | End: 2021-06-09

## 2021-06-09 RX ADMIN — ALBUTEROL SULFATE 2 PUFF: 90 AEROSOL, METERED RESPIRATORY (INHALATION) at 13:28

## 2021-06-14 LAB
DLCO %PRED: 69 %
DLCO PRED: NORMAL
DLCO/VA %PRED: NORMAL
DLCO/VA PRED: NORMAL
DLCO/VA: NORMAL
DLCO: NORMAL
EXPIRATORY TIME-POST: NORMAL
EXPIRATORY TIME: NORMAL
FEF 25-75% %CHNG: NORMAL
FEF 25-75% %PRED-POST: NORMAL
FEF 25-75% %PRED-PRE: NORMAL
FEF 25-75% PRED: NORMAL
FEF 25-75%-POST: NORMAL
FEF 25-75%-PRE: NORMAL
FEV1 %PRED-POST: 51 %
FEV1 %PRED-PRE: 44 %
FEV1 PRED: NORMAL
FEV1-POST: NORMAL
FEV1-PRE: NORMAL
FEV1/FVC %PRED-POST: NORMAL
FEV1/FVC %PRED-PRE: NORMAL
FEV1/FVC PRED: NORMAL
FEV1/FVC-POST: 66 %
FEV1/FVC-PRE: 64 %
FVC %PRED-POST: NORMAL
FVC %PRED-PRE: NORMAL
FVC PRED: NORMAL
FVC-POST: NORMAL
FVC-PRE: NORMAL
GAW %PRED: NORMAL
GAW PRED: NORMAL
GAW: NORMAL
IC %PRED: NORMAL
IC PRED: NORMAL
IC: NORMAL
MEP: NORMAL
MIP: NORMAL
MVV %PRED-PRE: NORMAL
MVV PRED: NORMAL
MVV-PRE: NORMAL
PEF %PRED-POST: NORMAL
PEF %PRED-PRE: NORMAL
PEF PRED: NORMAL
PEF%CHNG: NORMAL
PEF-POST: NORMAL
PEF-PRE: NORMAL
RAW %PRED: NORMAL
RAW PRED: NORMAL
RAW: NORMAL
RV %PRED: NORMAL
RV PRED: NORMAL
RV: NORMAL
SVC %PRED: NORMAL
SVC PRED: NORMAL
SVC: NORMAL
TLC %PRED: 83 %
TLC PRED: NORMAL
TLC: NORMAL
VA %PRED: NORMAL
VA PRED: NORMAL
VA: NORMAL
VTG %PRED: NORMAL
VTG PRED: NORMAL
VTG: NORMAL

## 2021-06-14 ASSESSMENT — PULMONARY FUNCTION TESTS
FEV1/FVC_POST: 66
FEV1/FVC_PRE: 64
FEV1_PERCENT_PREDICTED_POST: 51
FEV1_PERCENT_PREDICTED_PRE: 44

## 2021-06-15 NOTE — PROCEDURES
[address] [facility] [address]                [address] [place] [address]                               PULMONARY FUNCTION    PATIENT NAME: Destiny Hunt                   :        1955  MED REC NO:   [id number]                             ROOM:  ACCOUNT NO:   [de-identified]                           ADMIT DATE: 2021  PROVIDER:     Rich Rosenthal MD    DATE OF PROCEDURE:  2021    TYPE OF STUDY:  Complete PFT. RESULTS:  The patient had FEV1 of 1.09 which is severely reduced at 44%  predicted. FVC was 1.71 which is moderately to severely reduced at 54%  predicted. FEV1/FVC was reduced at 64. The mid-flows were severely  reduced at 27% predicted. Total lung capacity was normal at 83%  predicted. Residual volume was increased at 128% predicted. Diffusion  was moderately reduced at 69% predicted. Following bronchodilators,  there was significant improvement in the flows. IMPRESSION:  Severe obstructive pulmonary disease with air trapping,  moderate diffusion impairment and significant improvement in the flows  following bronchodilators. Clinical correlation is recommended.         Soco Jones MD    D: 2021 23:14:26       T: 06/15/2021 1:39:06     [id number]  Job#: [id number]     Doc#: [id number]    CC:

## 2021-07-15 LAB — HBA1C MFR BLD: 7.1 %

## 2021-07-21 ENCOUNTER — HOSPITAL ENCOUNTER (OUTPATIENT)
Dept: PHARMACY | Age: 66
Setting detail: THERAPIES SERIES
Discharge: HOME | End: 2021-07-21
Payer: COMMERCIAL

## 2021-07-21 VITALS — WEIGHT: 217.9 LBS | BODY MASS INDEX: 35.17 KG/M2

## 2021-07-21 PROCEDURE — 99212 OFFICE O/P EST SF 10 MIN: CPT

## 2021-07-21 NOTE — PROGRESS NOTES
Diabetic Medication Management   [address] [place] [address] [address]. [place], [address] [facility]  Phone: [phone]  Fax: [phone]    NAME: [facility] RECORD NUMBER:  [id number]  AGE: 60 y.o. GENDER: female  : 1955  EPISODE DATE:  2021       Ms. Ke Bach was referred to [facility] by Dr. Lian Couch, Special instructions include: follow up every 3 months with physician in office     Goals per referral:   Fasting blood glucose:   Peak postprandial glucose: < 180  A1C: < 7%    Other goals:  Blood pressure goal: 130/80    Weight loss goal (~10%): Target weight  200 to be reached by date: 2021 (3-6 months)  Physical Activity goal:    15 minutes 3 times per week to be reached by date: 2021 (3-6 months)  Smoking Cessation   Quit Date: never smoked  Cholesterol at target:Yes    Date: Dec 2020  Annual eye exam:    Date: 2021  Comprehensive annual foot exam:   Date: 3/16/21    Annual urine Albumin and serum creatinine:   Date: today 2020 SrCr 1.32 mg/dL; microalbumin 222.8 mg/L    Patient Specific Goals:  1. Lower blood sugars  2. Lower A1C    Subjective   Ms. Ke Bach is a 60 y.o. female here for the Diabetes Service for self-management education, medication review including over the counter medications and herbal products, overall wellbeing assessment, transition of care and any needed adjustments with updates and recommendations communicated to the referring physician. Patient's name and  verified. Patient visit in person in office. Patient Findings:  Sleep study done in 2021 and patient reports 13 episodes of apnea during sleep study. Patient states a CPAP machine was ordered and she is in process of getting it. Understands importance of cleaning. Patient continues to use Symbicort and Spirva on regular basis and albuterol as needed but states uses every day.      Patient saw Dr. Lian Couch on 7/15/2021. Patient states appt went well. POC A1C in office measured at 7.1. Documentation received so entered into Epic. Diabetic foot exam was not done per patient. Was done in March 2021. Patient has noticed some neuropathy in her feet due to lack of feeling. Encouraged her to tell us if foot exam not done with next appt. Encouraged patient to call and get appt with a podiatrist.   Next appt with Dr. Kadeem Velasquez is on October 13, 2021. Patient states her blood sugars were trending up a few weeks ago and she was eating oatmeal a lot due to being frustrated and trying to lower blood sugar. A few weeks ago her microwave broke and she stopped oatmeal as does not want to take the time to heat over stove. Thinks she will go back to oatmeal once gets new microwave. Weight is up about 5 lbs. Has enough supplies of all diabetic medications and diabetic testing supplies. Patient reports taking her insulin, metformin and Trulicity as instructed with no known missed doses other than sometimes (rarely) missing morning metformin if is unable to eat breakfast.  Patient states does best to not miss any medications. Patient states she has not been doing any regular exercise other than normal house work or grocery shopping. Patient states it has been too hot to exercise because bike is in kitchen and has no air conditioning. Encouraged her to do as she feels comfortable based on breathing etc but to try to get regular exercise as able. Encouraged to move bike to family room where she has air conditioner but does not use a lot. States still has a lot of clutter but will try to get cleaned out to fit bike. Set a goal of 15min of exercise three days a week (MWF)    Patient reports quite a bit of stress in last month but feels life has gotten a little less stressful lately. Patient feels stress really affects her blood sugar with elevations with increase in stress. No hypoglycemia since last visit. []  Missed doses   []  Emergency Room Visit    []  Medication changes  []  Hospitalization   [x]  Diet changes   []  Acute illness   []  Activity changes      Symptoms of hypoglycemia -     [x]  None   []  Shakiness    []  Lightheadedness or dizziness   []  Confusion      []  Sweating   []  Other       Symptoms of hyperglycemia   [x]  None   []  Frequent urination    []  Increased thirst   []  Other    Medication adverse reactions   [x]  None   []  Diarrhea / Nausea / Vomiting / Constipation / flatulence   []  Hypertension   []  Peripheral edema     []  Signs of infection   []  Headache   []  Vision changes   []  Increased cholesterol    []  Weight gain   []  Change in renal function   []  Increased potassium  []  Other  Comments:      If recent hospital admission / ED visit, was this related to Diabetes No:hypotension Discharge date July 2020    Objective     PMHx:    Past Medical History:   Diagnosis Date    Anxiety     Asthma     Common cold     [facility]     Depression     [facility]     Emphysema     Fatty liver     H/O: pneumonia FEB. 2014    Hiatal hernia     Hyperlipidemia     Hypertension     Mitral valve prolapse     Psychosis (HCC)     SOB (shortness of breath) on exertion     Spinal headache     Stroke ([facility] 2013    Mild Lt side    Upper respiratory symptom        Social History:    Social History     Tobacco Use    Smoking status: Passive Smoke Exposure - Never Smoker    Smokeless tobacco: Never Used    Tobacco comment: socially smoked when playing cards   Substance Use Topics    Alcohol use: No     Alcohol/week: 0.0 standard drinks     Comment: socially       Pertinent Labs:    Lab Results   Component Value Date    LABA1C 7.1 07/15/2021    LABA1C 7.7 03/16/2021    LABA1C 8 12/28/2020     Lab Results   Component Value Date    CHOL 145 02/12/2020    TRIG 158 02/12/2020    HDL 50 12/16/2020    LDLCALC 59 02/12/2020     Lipid panel on 12/16/2020  Cholesterol 135  HDL 50  LDL 55  Triglyceride 150      Lab Results   Component Value Date    CREATININE 1.32 (H) 12/16/2020    BUN 18 12/16/2020     12/16/2020    K 4.3 12/16/2020     12/16/2020    CO2 24 12/16/2020     CrCl calculated at 66ml/min      Lab Results   Component Value Date    ALT 26 12/16/2020       Weight:  Wt Readings from Last 3 Encounters:   07/21/21 217 lb 14.4 oz (98.8 kg)   05/18/21 212 lb (96.2 kg)   05/05/21 218 lb 9.6 oz (99.2 kg)       Current medications:  Prior to Admission medications    Medication Sig Start Date End Date Taking? Authorizing Provider   dorzolamide (TRUSOPT) 2 % ophthalmic solution Place 1 drop into both eyes 2 times daily    Historical Provider, MD   Dulaglutide (TRULICITY) 3 OB/9.0IJ SOPN Inject 3 mg into the skin once a week on Wednesdays 12/17/20   Emerson [place]ro[place]   brimonidine [facility]) 0.2 % ophthalmic solution Place 1 drop into both eyes 2 times daily 12/10/20   Historical Provider, MD   nitroGLYCERIN (NITROSTAT) 0.4 MG SL tablet Place 0.4 mg under the tongue every 5 minutes as needed for Chest pain up to max of 3 total doses. If no relief after 1 dose, call 911. Historical Provider, MD   Lancets MISC Use to check blood sugar 3-4 times daily (before meals and at bedtime) and as needed for symptoms. 10/21/20   Emerson Monroeville   insulin glargine (LANTUS SOLOSTAR) 100 UNIT/ML injection pen Inject 48 Units into the skin nightly  Patient taking differently: Inject 65 Units into the skin nightly  10/7/20   Emerson Monroeville   Insulin Pen Needle 32G X 4 MM MISC Use to inject Trulicity once a week on Wednesdays and Lantus once a day.  9/9/20   Emerson [place]   metFORMIN (GLUCOPHAGE XR) 500 MG extended release tablet Take 2 tablets by mouth 2 times daily (with meals) 9/9/20   Emerson [place]roeville   blood glucose monitor strips Test 3-4 times a day & as needed for symptoms of irregular blood glucose. Dispense sufficient amount for indicated testing frequency plus additional to accommodate PRN testing needs. 9/9/20   Anneliese Santana   albuterol sulfate  (90 Base) MCG/ACT inhaler Inhale 2 puffs into the lungs every 6 hours as needed for Wheezing    Historical Provider, MD   tiotropium (SPIRIVA RESPIMAT) 2.5 MCG/ACT AERS inhaler Inhale 2 puffs into the lungs daily    Historical Provider, MD   escitalopram (LEXAPRO) 20 MG tablet Take 20 mg by mouth daily     Historical Provider, MD   aspirin 81 MG chewable tablet Take 81 mg by mouth    Historical Provider, MD   metoprolol succinate (TOPROL XL) 50 MG extended release tablet Take 50 mg by mouth daily  1/12/18   Historical Provider, MD   budesonide-formoterol (SYMBICORT) 160-4.5 MCG/ACT AERO 2 puffs twice a day 5/1/18   Historical Provider, MD   Cholecalciferol (VITAMIN D3) 2000 units CAPS Take by mouth daily    Historical Provider, MD   FLUARIX QUADRIVALENT 0.5 ML injection inject 0.5 milliliter intramuscularly 8/4/17   Historical Provider, MD   losartan (COZAAR) 50 MG tablet Take 50 mg by mouth daily  10/25/17   Historical Provider, MD   brexpiprazole (REXULTI) 4 MG TABS tablet Take 2 mg by mouth daily     Historical Provider, MD   rosuvastatin (CRESTOR) 40 MG tablet Take 40 mg by mouth every evening    Historical Provider, MD   montelukast (SINGULAIR) 10 MG tablet Take 10 mg by mouth nightly    Historical Provider, MD   pantoprazole (PROTONIX) 40 MG tablet Take 1 tablet by mouth daily 7/20/16   Davis Fisher PA-C   loratadine (CLARITIN) 10 MG tablet Take 10 mg by mouth daily as needed (allergies)     Historical Provider, MD   albuterol (PROVENTIL) (2.5 MG/3ML) 0.083% nebulizer solution Take 2.5 mg by nebulization every 6 hours as needed for Wheezing.     Historical Provider, MD       Immunizations:   Most Recent Immunizations   Administered Date(s) Administered    Influenza Virus Vaccine 11/20/2015    Pneumococcal Conjugate 13-valent (Agustin Pedersen) 11/20/2015       Home Blood Glucose Results:     Blood glucose trends noted:      Date  Fasting   Before lunch   Before dinner     Bedtime  7/21  160  7/20     141   186 (after dinner)  7/19  93   142  7/18     112   131  7/17     192   195 (after dinner)  7/16        74  104  7/15  105   103   249  7/14  169   222   188  7/13  156      147  7/12  192   181  7/11  158   155   137  7/10  88  7/9  86   148 (after lunch)  7/8  135   209     7/7  114   100   230  7/6  203   321  7/5  174      310  7/4     121  7/3  126   178  7/2  149   140  7/1  85  6/30  160   144  6/29  141   96  6/28  176   126  6/27  120   89  6/26  102         Discussed blood sugars with patient and that even though A1c significantly improved we still have quite a bit of opportunity to improve blood sugar control. Discussed possible increase in insulin dose but patient wishes to work on resuming oatmeal and trying to work on exercise. Assessment     A1c at goal: No but decreasing  7.1 on 7/15/21  Blood Pressure at goal: Yes 120/70 on 7/15/2021  Weight at goal: No   Physical activity: not consistently. Physical activity at goal: no  Smoking Cessation: Yes  Cholesterol at target: Yes: LDL 55  Annual eye exam completed: Yes April 2021  Comprehensive Foot Exam Completed:  Yes  Annual urine albumin and serum creatinine: Yes    Statin: Yes    Appropriate?: Yes  Changes made:     ACE/ARB: Yes  Appropriate?: Yes  Changes made:     Aspirin: Yes  Appropriate?: Yes  Changes made:     Eating patterns:    []  My plate    []  Mediterranean diet   []  Low sodium   []  DASH diet   []  Portion control   []  Reduced calorie    []  Fast food / Restaurant  []  High glycemic index foods   []  Sugary beverages   [x]  Other     Comment: see above    Current Medications Affecting Diabetes:  Dulaglutide 3mg subQ once weekly on Wednesdays  Insulin glargine 65 units subQ each evening  Metformin XR 1000mg in the morning and 1000 mg XR in the evening       Compliant: Yes     Barriers to medication therapy: No     Medications attempted in the past:  Metformin about 3 years ago with some diarrhea. Unsure if regular or extended release. Tolerating ER well. SGLT2 Gladis Fernandes) frequent yeast infections. Recent exacerbations / new problems:       Last office dictation reviewed: yes        type 2 DM under improving control as evidenced by A1C of 7.1 on 7/15/2021    Plan   Continue Insulin glargine (Lantus)  65 Units subQ each evening. Pt to call the clinic with blood sugars <70 or >200    Continue Dulaglutide (Trulicity) 3mg subQ once weekly on . Continue Metformin XR 1000mg in the morning and 1000 mg XR in the evening    Check blood sugars at least three times daily and document in sugar log. Patient to reece down whenever she eats something she feels is a treat or splurg. Encouraged her to write down what happened the meal prior for any blood sugars below 70 or above 200. Bring in glucose log to every appt. Use exercise bike or do light weight training with goal of 15 minutes Mon/Wed and Fri. Physician Follow-up:  Every 3 months     Medication Management Follow-up:   Diabetes Service   6 weeks     Electronically signed by Marylen Cost, RPH on 2021 at 10:34 AM     For Pharmacy [id number] [facility] in place:   Yes   Recommendation Provided To: Patient/Caregiver: 3 via In person   Intervention Detail: Adherence Monitorin and Scheduled Appointment   Intervention Accepted By: Patient/Caregiver: 3   Time Spent (min): 40

## 2021-08-03 ENCOUNTER — OFFICE VISIT (OUTPATIENT)
Dept: PODIATRY | Age: 66
End: 2021-08-03
Payer: COMMERCIAL

## 2021-08-03 VITALS — BODY MASS INDEX: 36.32 KG/M2 | WEIGHT: 218 LBS | HEIGHT: 65 IN

## 2021-08-03 DIAGNOSIS — S90.422A BLISTER OF LEFT GREAT TOE, INITIAL ENCOUNTER: Primary | ICD-10-CM

## 2021-08-03 DIAGNOSIS — M79.675 PAIN IN TOE OF LEFT FOOT: ICD-10-CM

## 2021-08-03 PROCEDURE — 1036F TOBACCO NON-USER: CPT | Performed by: PODIATRIST

## 2021-08-03 PROCEDURE — G8427 DOCREV CUR MEDS BY ELIG CLIN: HCPCS | Performed by: PODIATRIST

## 2021-08-03 PROCEDURE — G8417 CALC BMI ABV UP PARAM F/U: HCPCS | Performed by: PODIATRIST

## 2021-08-03 PROCEDURE — 3017F COLORECTAL CA SCREEN DOC REV: CPT | Performed by: PODIATRIST

## 2021-08-03 PROCEDURE — 99213 OFFICE O/P EST LOW 20 MIN: CPT | Performed by: PODIATRIST

## 2021-08-03 ASSESSMENT — ENCOUNTER SYMPTOMS
COLOR CHANGE: 0
NAUSEA: 0
SHORTNESS OF BREATH: 0
BACK PAIN: 0
DIARRHEA: 0

## 2021-08-03 NOTE — PROGRESS NOTES
[address] [place] [address] Podiatry  Return Patient Progress Note    Subjective: Amy Dent 60 y.o. female that presents with chief complaint of a painful blister to the bottom of the left great toe. Chief Complaint   Patient presents with    Blister     left hallux     Diabetes     last a1c 7.1    Patient states that this has been present for two days. Patient denies any injury to the left great toe. Patient states that she is concerned because she is a diabetic. Pain is rated 6 out of 10 and is described as intermittent. Patient has been dressing the area with antibiotic ointment and a band aid and has been soaking it with warm water and Epsom salts nightly. Review of Systems   Constitutional: Negative for activity change, appetite change, chills, diaphoresis, fatigue and fever. Respiratory: Negative for shortness of breath. Cardiovascular: Negative for leg swelling. Gastrointestinal: Negative for diarrhea and nausea. Endocrine: Negative for cold intolerance, heat intolerance and polyuria. Musculoskeletal: Positive for arthralgias. Negative for back pain, gait problem, joint swelling and myalgias. Skin: Positive for wound. Negative for color change, pallor and rash. Allergic/Immunologic: Negative for environmental allergies and food allergies. Neurological: Negative for dizziness, weakness, light-headedness and numbness. Hematological: Does not bruise/bleed easily. Psychiatric/Behavioral: Negative for behavioral problems, confusion and self-injury. The patient is not nervous/anxious. Objective: Clinical evaluation of the patient reveals a small intact blister to the plantar aspect of the left hallux. The blister appears clear. There is mild erythema surrounding the blister, but no calor is noted. There is no drainage or malodor noted to the area. There is pain with palpation of this lesion. There is no subluxation or dislocation noted to the left hallux.     Assessment:    Diagnosis Orders   1. Blister of left great toe, initial encounter     2. Pain in toe of left foot           Plan: 1. Clinical evaluation of the patient. 2. The left hallux was dressed with antibiotic ointment and a band aid. Patient to change this dressing daily. Patient advised to stop soaking the toe at this time. 3. Return in about 1 week (around 8/10/2021) for Wound Care.    8/3/2021      Caleb Farmer DPM

## 2021-08-10 ENCOUNTER — OFFICE VISIT (OUTPATIENT)
Dept: PODIATRY | Age: 66
End: 2021-08-10
Payer: COMMERCIAL

## 2021-08-10 VITALS — WEIGHT: 218 LBS | HEIGHT: 65 IN | BODY MASS INDEX: 36.32 KG/M2

## 2021-08-10 DIAGNOSIS — S90.422D BLISTER OF LEFT GREAT TOE, SUBSEQUENT ENCOUNTER: Primary | ICD-10-CM

## 2021-08-10 DIAGNOSIS — M79.675 PAIN IN TOE OF LEFT FOOT: ICD-10-CM

## 2021-08-10 PROCEDURE — G8427 DOCREV CUR MEDS BY ELIG CLIN: HCPCS | Performed by: PODIATRIST

## 2021-08-10 PROCEDURE — G8417 CALC BMI ABV UP PARAM F/U: HCPCS | Performed by: PODIATRIST

## 2021-08-10 PROCEDURE — 3017F COLORECTAL CA SCREEN DOC REV: CPT | Performed by: PODIATRIST

## 2021-08-10 PROCEDURE — 99213 OFFICE O/P EST LOW 20 MIN: CPT | Performed by: PODIATRIST

## 2021-08-10 PROCEDURE — 1036F TOBACCO NON-USER: CPT | Performed by: PODIATRIST

## 2021-08-10 ASSESSMENT — ENCOUNTER SYMPTOMS
DIARRHEA: 0
NAUSEA: 0
COLOR CHANGE: 0
SHORTNESS OF BREATH: 0
BACK PAIN: 0

## 2021-08-10 NOTE — PROGRESS NOTES
[address] [place] [address] Podiatry  Return Patient Progress Note    Subjective: Lupe Salguero 60 y.o. female that presents for follow up evaluation of blister to the left great toe. Chief Complaint   Patient presents with    Follow-up     blister on left hallux    Diabetes         Patient's treatment thus far has included daily dressing changes with antibiotic ointment and a band aid. Pain is rated 4 out of 10 and is described as intermittent. Patient has been following my prescribed course of therapy as instructed. Review of Systems   Constitutional: Negative for activity change, appetite change, chills, diaphoresis, fatigue and fever. Respiratory: Negative for shortness of breath. Cardiovascular: Negative for leg swelling. Gastrointestinal: Negative for diarrhea and nausea. Endocrine: Negative for cold intolerance, heat intolerance and polyuria. Musculoskeletal: Positive for arthralgias. Negative for back pain, gait problem, joint swelling and myalgias. Skin: Positive for wound. Negative for color change, pallor and rash. Allergic/Immunologic: Negative for environmental allergies and food allergies. Neurological: Negative for dizziness, weakness, light-headedness and numbness. Hematological: Does not bruise/bleed easily. Psychiatric/Behavioral: Negative for behavioral problems, confusion and self-injury. The patient is not nervous/anxious. Objective: Clinical evaluation of the patient reveals eschar to the plantar aspect of the left hallux. This eschar is lytic. There is pain with direct palpation to this area of the left hallux. There is no erythema or calor noted to the left hallux. Debridement of this eschar with a fifteen blade reveals a small, superficial wound measuring 0.3 cm in diameter and not penetrating through the dermis. There is no purulence or malodor noted. Assessment:    Diagnosis Orders   1. Blister of left great toe, subsequent encounter     2.  Pain in toe of left foot           Plan: 1. Clinical evaluation of the patient. 2. The left hallux was dressed with antibiotic ointment and a band aid. Patient to change this dressing daily. 3. Return in about 1 week (around 8/17/2021) for Wound Care.    8/10/2021      Mary Bliss DPM

## 2021-08-17 ENCOUNTER — OFFICE VISIT (OUTPATIENT)
Dept: PODIATRY | Age: 66
End: 2021-08-17
Payer: COMMERCIAL

## 2021-08-17 VITALS — BODY MASS INDEX: 36.32 KG/M2 | WEIGHT: 218 LBS | HEIGHT: 65 IN

## 2021-08-17 DIAGNOSIS — S90.422D BLISTER OF LEFT GREAT TOE, SUBSEQUENT ENCOUNTER: Primary | ICD-10-CM

## 2021-08-17 DIAGNOSIS — M79.675 PAIN IN TOE OF LEFT FOOT: ICD-10-CM

## 2021-08-17 PROCEDURE — G8417 CALC BMI ABV UP PARAM F/U: HCPCS | Performed by: PODIATRIST

## 2021-08-17 PROCEDURE — 3017F COLORECTAL CA SCREEN DOC REV: CPT | Performed by: PODIATRIST

## 2021-08-17 PROCEDURE — 99213 OFFICE O/P EST LOW 20 MIN: CPT | Performed by: PODIATRIST

## 2021-08-17 PROCEDURE — 1036F TOBACCO NON-USER: CPT | Performed by: PODIATRIST

## 2021-08-17 PROCEDURE — G8427 DOCREV CUR MEDS BY ELIG CLIN: HCPCS | Performed by: PODIATRIST

## 2021-08-17 ASSESSMENT — ENCOUNTER SYMPTOMS
SHORTNESS OF BREATH: 0
DIARRHEA: 0
BACK PAIN: 0
COLOR CHANGE: 0
NAUSEA: 0

## 2021-08-17 NOTE — PROGRESS NOTES
[facility] Podiatry  Return Patient Progress Note    Subjective: Amy Dent 60 y.o. female that presents for follow up evaluation of wound to the left great toe. Chief Complaint   Patient presents with    Wound Check     left hallux    Patient's treatment thus far has included daily dressing changes with antibiotic ointment and a band aid. Pain is rated 0 out of 10 and is described as intermittent. Patient has been following my prescribed course of therapy as instructed. Review of Systems   Constitutional: Negative for activity change, appetite change, chills, diaphoresis, fatigue and fever. Respiratory: Negative for shortness of breath. Cardiovascular: Negative for leg swelling. Gastrointestinal: Negative for diarrhea and nausea. Endocrine: Negative for cold intolerance, heat intolerance and polyuria. Musculoskeletal: Positive for arthralgias. Negative for back pain, gait problem, joint swelling and myalgias. Skin: Positive for wound. Negative for color change, pallor and rash. Allergic/Immunologic: Negative for environmental allergies and food allergies. Neurological: Negative for dizziness, weakness, light-headedness and numbness. Hematological: Does not bruise/bleed easily. Psychiatric/Behavioral: Negative for behavioral problems, confusion and self-injury. The patient is not nervous/anxious. Objective: Clinical evaluation of the patient reveals no remaining open wound to the left hallux. There is a very small eschar noted to the area, but this eschar is stable. There is no erythema, calor, drainage, or malodor noted to the left hallux. There is no pain with palpation to the left hallux. Assessment:    Diagnosis Orders   1. Blister of left great toe, subsequent encounter     2. Pain in toe of left foot           Plan: 1. Clinical evaluation of the patient.  2. Patient informed that she has adequately healed and she may discontinue dressing changes to the left hallux at this time. 3. Return if symptoms worsen or fail to improve.    8/17/2021      Krista Antunez DPM

## 2021-09-08 ENCOUNTER — TELEPHONE (OUTPATIENT)
Dept: PHARMACY | Age: 66
End: 2021-09-08

## 2021-09-08 ENCOUNTER — HOSPITAL ENCOUNTER (OUTPATIENT)
Dept: PHARMACY | Age: 66
Setting detail: THERAPIES SERIES
Discharge: HOME | End: 2021-09-08
Payer: COMMERCIAL

## 2021-09-08 VITALS — WEIGHT: 217 LBS | BODY MASS INDEX: 36.11 KG/M2

## 2021-09-08 PROCEDURE — 99213 OFFICE O/P EST LOW 20 MIN: CPT

## 2021-09-08 NOTE — PROGRESS NOTES
well this last weekend on 9/4 with rapid heartbeat. States she just tried to relax and it resolved per patient. Patient reports feeling dizzy/weak but did not want to call physician or go anywhere due to having her granddaughter with her and not wanting to scare her. Encouraged her to seek medical attention in the future if it occurs again. Patient has not gotten her CPAP machine as of yet due to recall on a machine per patient. States she has not received a phone call. Encouraged her to call pulmonologist.  States she has an appt with pulmonolgist next month. Gave patient number for Dr. Gypsy Hwang at [phone]    Patient had blood blister on foot so saw podiatrist and states it is now healed up. Patient states foot exam is normally done by Dr. Jr Bragg CNP on October 13, 2021    Patient did get a new microwave. Patient states she bought oatmeal but has not been eating it as of yet. Plans to get back to better diet. Weight is down about 1 lb. Has enough supplies of all diabetic medications and diabetic testing supplies. Patient states weather has still been too hot so has not been able to exercise on her bike. Hopes to . Has been walking about 1 mile about three (3) days per week when she goes to the store - does this in the morning before it gets hot. States her granddaughter needs to exercise also. Encouraged patient to try to ride exercise bike as able due to weather. Patient states she wants to take a multivitamin. No hypoglycemia since last visit. Patient reports taking her insulin and metformin as instructed but states her Trulicity was decreased to 1.5mg after her 7/15/21 appt with Dr. Cirilo Nelson. Call placed to Dr. Cirilo Nelson office to see if he intentionally decreased dose or if we can resume 3mg dose. Message left on nurses line so will wait for call back and inform patient.         []  Missed doses   []  Emergency Room Visit    [x]  Medication changes  [] Hospitalization   []  Diet changes   []  Acute illness   []  Activity changes      Symptoms of hypoglycemia -     [x]  None   []  Shakiness    []  Lightheadedness or dizziness   []  Confusion      []  Sweating   []  Other       Symptoms of hyperglycemia   [x]  None   []  Frequent urination    []  Increased thirst   []  Other    Medication adverse reactions   [x]  None   []  Diarrhea / Nausea / Vomiting / Constipation / flatulence   []  Hypertension   []  Peripheral edema     []  Signs of infection   []  Headache   []  Vision changes   []  Increased cholesterol    []  Weight gain   []  Change in renal function   []  Increased potassium  []  Other  Comments:      If recent hospital admission / ED visit, was this related to Diabetes No:hypotension Discharge date July 2020    Objective     PMHx:    Past Medical History:   Diagnosis Date    Anxiety     Asthma     Common cold     [facility]     Depression     Diabetes mellitus ([facility]     Emphysema     Fatty liver     H/O: pneumonia FEB. 2014    Hiatal hernia     Hyperlipidemia     Hypertension     Mitral valve prolapse     Psychosis (HCC)     SOB (shortness of breath) on exertion     Spinal headache     Stroke ([facility] 2013    Mild Lt side    Upper respiratory symptom        Social History:    Social History     Tobacco Use    Smoking status: Passive Smoke Exposure - Never Smoker    Smokeless tobacco: Never Used    Tobacco comment: socially smoked when playing cards   Substance Use Topics    Alcohol use: No     Alcohol/week: 0.0 standard drinks     Comment: socially       Pertinent Labs:    Lab Results   Component Value Date    LABA1C 7.1 07/15/2021    LABA1C 7.7 03/16/2021    LABA1C 8 12/28/2020     Lab Results   Component Value Date    CHOL 145 02/12/2020    TRIG 158 02/12/2020    HDL 50 12/16/2020    LDLCALC 59 02/12/2020     Lipid panel on 12/16/2020  Cholesterol 135  HDL 50  LDL 55  Triglyceride 150      Lab Results   Component Value Date    CREATININE 1.32 (H) 12/16/2020    BUN 18 12/16/2020     12/16/2020    K 4.3 12/16/2020     12/16/2020    CO2 24 12/16/2020     CrCl calculated at 66ml/min      Lab Results   Component Value Date    ALT 26 12/16/2020       Weight:  Wt Readings from Last 3 Encounters:   09/08/21 217 lb (98.4 kg)   08/17/21 218 lb (98.9 kg)   08/10/21 218 lb (98.9 kg)       Current medications:  Prior to Admission medications    Medication Sig Start Date End Date Taking? Authorizing Provider   dorzolamide (TRUSOPT) 2 % ophthalmic solution Place 1 drop into both eyes 2 times daily    Historical Provider, MD   Dulaglutide (TRULICITY) 3 PE/4.8VB SOPN Inject 3 mg into the skin once a week on Wednesdays 12/17/20   Donelda Cart   brimonidine [facility]) 0.2 % ophthalmic solution Place 1 drop into both eyes 2 times daily 12/10/20   Historical Provider, MD   nitroGLYCERIN (NITROSTAT) 0.4 MG SL tablet Place 0.4 mg under the tongue every 5 minutes as needed for Chest pain up to max of 3 total doses. If no relief after 1 dose, call 911. Historical Provider, MD   Lancets MISC Use to check blood sugar 3-4 times daily (before meals and at bedtime) and as needed for symptoms. 10/21/20   Donelda Cart   insulin glargine (LANTUS SOLOSTAR) 100 UNIT/ML injection pen Inject 48 Units into the skin nightly  Patient taking differently: Inject 65 Units into the skin nightly  10/7/20   Donelda Cart   Insulin Pen Needle 32G X 4 MM MISC Use to inject Trulicity once a week on Wednesdays and Lantus once a day. 9/9/20   Donelda Cart   metFORMIN (GLUCOPHAGE XR) 500 MG extended release tablet Take 2 tablets by mouth 2 times daily (with meals) 9/9/20   Donelda Cart   blood glucose monitor strips Test 3-4 times a day & as needed for symptoms of irregular blood glucose.  Dispense sufficient amount for indicated testing frequency plus additional to accommodate PRN testing needs. 9/9/20   Kamaljit Santana   albuterol sulfate  (90 Base) MCG/ACT inhaler Inhale 2 puffs into the lungs every 6 hours as needed for Wheezing    Historical Provider, MD   tiotropium (SPIRIVA RESPIMAT) 2.5 MCG/ACT AERS inhaler Inhale 2 puffs into the lungs daily    Historical Provider, MD   escitalopram (LEXAPRO) 20 MG tablet Take 20 mg by mouth daily     Historical Provider, MD   aspirin 81 MG chewable tablet Take 81 mg by mouth    Historical Provider, MD   metoprolol succinate (TOPROL XL) 50 MG extended release tablet Take 50 mg by mouth daily  1/12/18   Historical Provider, MD   budesonide-formoterol (SYMBICORT) 160-4.5 MCG/ACT AERO 2 puffs twice a day 5/1/18   Historical Provider, MD   Cholecalciferol (VITAMIN D3) 2000 units CAPS Take by mouth daily    Historical Provider, MD   FLUARIX QUADRIVALENT 0.5 ML injection inject 0.5 milliliter intramuscularly 8/4/17   Historical Provider, MD   losartan (COZAAR) 50 MG tablet Take 50 mg by mouth daily  10/25/17   Historical Provider, MD   brexpiprazole (REXULTI) 4 MG TABS tablet Take 2 mg by mouth daily     Historical Provider, MD   rosuvastatin (CRESTOR) 40 MG tablet Take 40 mg by mouth every evening    Historical Provider, MD   montelukast (SINGULAIR) 10 MG tablet Take 10 mg by mouth nightly    Historical Provider, MD   pantoprazole (PROTONIX) 40 MG tablet Take 1 tablet by mouth daily 7/20/16   Oscar Lambert PA-C   loratadine (CLARITIN) 10 MG tablet Take 10 mg by mouth daily as needed (allergies)     Historical Provider, MD   albuterol (PROVENTIL) (2.5 MG/3ML) 0.083% nebulizer solution Take 2.5 mg by nebulization every 6 hours as needed for Wheezing.     Historical Provider, MD       Immunizations:   Most Recent Immunizations   Administered Date(s) Administered    COVID-19, Moderna, PF, 100mcg/0.5mL 04/06/2021    Influenza Virus Vaccine 11/20/2015    Pneumococcal Conjugate 13-valent (Genette Guise) 11/20/2015       Home Blood Glucose Results:     Blood glucose trends noted:      Date  Fasting  Before lunch    After lunch   Before dinner  After dinner    Bedtime  9/8/21  159  9/7  105     188  184  9/6    81     211  9/5  160  82        125  9/4  206  180   176  9/3  140  200  9/2    104     97  9/1  149       134  8/31    108     116  8/30  103  110  8/29  111  129  8/28  115  109     173  8/27  120  173     136  8/26    84  8/25  98  153     197  8/24  126  121     127  8/23  127  100     126  8/22  91  94     122  8/21  184  195     211  8/20  167  92     133  8/19  228  165     273  8/18  169       184  8/17 8/16  126  263  815  152       144             Overall blood sugars similar to last visit. Fasting blood sugars will be within range for multiple days in a row then have several in a row elevated. When fasting blood sugars are elevated the rest of the day appears to stay elevated. Unsure if decrease in Trulicity dose could be part of the cause. Patient encouraged to note what she eats when blood sugar is elevated but only noted with one instance when ate pizza with granddaughter the night before. Patient indicates frustration with increasing insulin doses and as A1c was lowered at 7.1 at last endo visit will continue current dosing and await call back about increasing Trulicity back to 3mg dose. Patient has not resumed oatmeal nor exercise so if blood sugars do not improve or if A1c increase may need to increase insulin dose. Assessment     A1c at goal: No but decreasing  7.1 on 7/15/21  Blood Pressure at goal: Yes 120/70 on 7/15/2021  Weight at goal: No   Physical activity: not consistently. Physical activity at goal: no  Smoking Cessation: Yes  Cholesterol at target: Yes: LDL 55  Annual eye exam completed: Yes April 2021  Comprehensive Foot Exam Completed:  Yes  Annual urine albumin and serum creatinine: Yes    Statin: Yes    Appropriate?: Yes  Changes made:     ACE/ARB: Yes  Appropriate?: Yes  Changes made:     Aspirin: Yes  Appropriate?: Yes  Changes made:     Eating patterns:    []  My plate    []  Mediterranean diet   []  Low sodium   []  DASH diet   []  Portion control   []  Reduced calorie    []  Fast food / Restaurant  []  High glycemic index foods   []  Sugary beverages   [x]  Other     Comment: see above    Current Medications Affecting Diabetes:  Dulaglutide 1.5mg subQ once weekly on Wednesdays  Insulin glargine 65 units subQ each evening  Metformin XR 1000mg in the morning and 1000 mg XR in the evening       Compliant: Yes     Barriers to medication therapy: No     Medications attempted in the past:  Metformin about 3 years ago with some diarrhea. Unsure if regular or extended release. Tolerating ER well. SGLT2 Amna Hitchcock) frequent yeast infections. Recent exacerbations / new problems:       Last office dictation reviewed: yes        type 2 DM under improving control as evidenced by A1C of 7.1 on 7/15/2021    Plan   Continue Insulin glargine (Lantus)  65 Units subQ each evening. Pt to call the clinic with blood sugars <70 or >200    Continue Dulaglutide (Trulicity) 7.6WG subQ once weekly on Wednesdays. Will check into increasing to 3mg and follow up with patient accordingly. Continue Metformin XR 1000mg in the morning and 1000 mg XR in the evening    Check blood sugars at least three times daily and document in sugar log. Patient to reece down whenever she eats something she feels is a treat or splurg. Encouraged her to write down what happened the meal prior for any blood sugars below 70 or above 200. Bring in glucose log to every appt. Use exercise bike or do light weight training with goal of 15 minutes Mon/Wed and Fri. Physician Follow-up:  Every 3 months     Medication Management Follow-up:   Diabetes Service   6 weeks after next endo appt.      Electronically signed by Massimo Chavez RPH on 9/8/2021 at 10:36 AM     For Pharmacy Admin Tracking Only     CPA in place: Yes   Recommendation Provided To: Patient/Caregiver: 3 via In person   Intervention Detail: Adherence Monitorin and Scheduled Appointment   Intervention Accepted By: Patient/Caregiver: 3   Time Spent (min): 40     Satish Sanches RPH,Pharm. D,, BCPS, CACP  2021  11:45 AM

## 2021-10-20 ENCOUNTER — HOSPITAL ENCOUNTER (OUTPATIENT)
Age: 66
Discharge: HOME | End: 2021-10-20
Payer: COMMERCIAL

## 2021-10-20 ENCOUNTER — HOSPITAL ENCOUNTER (OUTPATIENT)
Dept: PHARMACY | Age: 66
Setting detail: THERAPIES SERIES
Discharge: HOME | End: 2021-10-20
Payer: COMMERCIAL

## 2021-10-20 VITALS — BODY MASS INDEX: 35.94 KG/M2 | WEIGHT: 216 LBS

## 2021-10-20 LAB
-: ABNORMAL
ABSOLUTE EOS #: 0.24 K/UL (ref 0–0.4)
ABSOLUTE IMMATURE GRANULOCYTE: ABNORMAL K/UL (ref 0–0.3)
ABSOLUTE LYMPH #: 2.16 K/UL (ref 1–4.8)
ABSOLUTE MONO #: 0.72 K/UL (ref 0.1–1.3)
ALBUMIN SERPL-MCNC: 4.4 G/DL (ref 3.5–5.2)
ALBUMIN SERPL-MCNC: 4.4 G/DL (ref 3.5–5.2)
ALBUMIN/GLOBULIN RATIO: ABNORMAL (ref 1–2.5)
ALBUMIN/GLOBULIN RATIO: ABNORMAL (ref 1–2.5)
ALP BLD-CCNC: 82 U/L (ref 35–104)
ALP BLD-CCNC: 82 U/L (ref 35–104)
ALT SERPL-CCNC: 28 U/L (ref 5–33)
ALT SERPL-CCNC: 28 U/L (ref 5–33)
AMORPHOUS: ABNORMAL
ANION GAP SERPL CALCULATED.3IONS-SCNC: 10 MMOL/L (ref 9–17)
ANION GAP SERPL CALCULATED.3IONS-SCNC: 10 MMOL/L (ref 9–17)
AST SERPL-CCNC: 20 U/L
AST SERPL-CCNC: 20 U/L
BACTERIA: ABNORMAL
BASOPHILS # BLD: 1 % (ref 0–2)
BASOPHILS ABSOLUTE: 0.08 K/UL (ref 0–0.2)
BILIRUB SERPL-MCNC: 0.36 MG/DL (ref 0.3–1.2)
BILIRUB SERPL-MCNC: 0.36 MG/DL (ref 0.3–1.2)
BILIRUBIN URINE: NEGATIVE
BUN BLDV-MCNC: 20 MG/DL (ref 8–23)
BUN BLDV-MCNC: 20 MG/DL (ref 8–23)
BUN/CREAT BLD: ABNORMAL (ref 9–20)
BUN/CREAT BLD: ABNORMAL (ref 9–20)
CALCIUM SERPL-MCNC: 10.1 MG/DL (ref 8.6–10.4)
CALCIUM SERPL-MCNC: 10.1 MG/DL (ref 8.6–10.4)
CASTS UA: ABNORMAL /LPF
CHLORIDE BLD-SCNC: 101 MMOL/L (ref 98–107)
CHLORIDE BLD-SCNC: 101 MMOL/L (ref 98–107)
CHOLESTEROL/HDL RATIO: 2.9
CHOLESTEROL/HDL RATIO: 2.9
CHOLESTEROL: 171 MG/DL
CHOLESTEROL: 171 MG/DL
CO2: 25 MMOL/L (ref 20–31)
CO2: 25 MMOL/L (ref 20–31)
COLOR: YELLOW
COMMENT UA: ABNORMAL
CREAT SERPL-MCNC: 0.88 MG/DL (ref 0.5–0.9)
CREAT SERPL-MCNC: 0.88 MG/DL (ref 0.5–0.9)
CREATININE URINE: 105.4 MG/DL (ref 28–217)
CREATININE URINE: 105.4 MG/DL (ref 28–217)
CRYSTALS, UA: ABNORMAL /HPF
DIFFERENTIAL TYPE: ABNORMAL
EOSINOPHILS RELATIVE PERCENT: 3 % (ref 0–4)
EPITHELIAL CELLS UA: ABNORMAL /HPF
ESTIMATED AVERAGE GLUCOSE: 180 MG/DL
GFR AFRICAN AMERICAN: >60 ML/MIN
GFR AFRICAN AMERICAN: >60 ML/MIN
GFR NON-AFRICAN AMERICAN: >60 ML/MIN
GFR NON-AFRICAN AMERICAN: >60 ML/MIN
GFR SERPL CREATININE-BSD FRML MDRD: ABNORMAL ML/MIN/{1.73_M2}
GLUCOSE BLD-MCNC: 187 MG/DL (ref 70–99)
GLUCOSE BLD-MCNC: 187 MG/DL (ref 70–99)
GLUCOSE URINE: NEGATIVE
HBA1C MFR BLD: 7.9 % (ref 4–6)
HCT VFR BLD CALC: 35.3 % (ref 36–46)
HDLC SERPL-MCNC: 58 MG/DL
HDLC SERPL-MCNC: 58 MG/DL
HEMOGLOBIN: 11.3 G/DL (ref 12–16)
IMMATURE GRANULOCYTES: ABNORMAL %
KETONES, URINE: NEGATIVE
LDL CHOLESTEROL DIRECT: 87 MG/DL
LDL CHOLESTEROL: 86 MG/DL (ref 0–130)
LDL CHOLESTEROL: 86 MG/DL (ref 0–130)
LEUKOCYTE ESTERASE, URINE: ABNORMAL
LYMPHOCYTES # BLD: 27 % (ref 24–44)
MCH RBC QN AUTO: 24.7 PG (ref 26–34)
MCHC RBC AUTO-ENTMCNC: 32 G/DL (ref 31–37)
MCV RBC AUTO: 77.1 FL (ref 80–100)
MICROALBUMIN/CREAT 24H UR: 53 MG/L
MICROALBUMIN/CREAT 24H UR: 53 MG/L
MICROALBUMIN/CREAT UR-RTO: 50 MCG/MG CREAT
MICROALBUMIN/CREAT UR-RTO: 50 MCG/MG CREAT
MONOCYTES # BLD: 9 % (ref 1–7)
MORPHOLOGY: ABNORMAL
MUCUS: ABNORMAL
NITRITE, URINE: NEGATIVE
NRBC AUTOMATED: ABNORMAL PER 100 WBC
OTHER OBSERVATIONS UA: ABNORMAL
PDW BLD-RTO: 17.9 % (ref 11.5–14.9)
PH UA: 5 (ref 5–8)
PLATELET # BLD: 341 K/UL (ref 150–450)
PLATELET ESTIMATE: ABNORMAL
PMV BLD AUTO: 7.2 FL (ref 6–12)
POTASSIUM SERPL-SCNC: 5 MMOL/L (ref 3.7–5.3)
POTASSIUM SERPL-SCNC: 5 MMOL/L (ref 3.7–5.3)
PROTEIN UA: ABNORMAL
RBC # BLD: 4.58 M/UL (ref 4–5.2)
RBC # BLD: ABNORMAL 10*6/UL
RBC UA: ABNORMAL /HPF
RENAL EPITHELIAL, UA: ABNORMAL /HPF
SEG NEUTROPHILS: 60 % (ref 36–66)
SEGMENTED NEUTROPHILS ABSOLUTE COUNT: 4.8 K/UL (ref 1.3–9.1)
SODIUM BLD-SCNC: 136 MMOL/L (ref 135–144)
SODIUM BLD-SCNC: 136 MMOL/L (ref 135–144)
SPECIFIC GRAVITY UA: 1.02 (ref 1–1.03)
THYROXINE, FREE: 1.16 NG/DL (ref 0.93–1.7)
TOTAL PROTEIN: 7.9 G/DL (ref 6.4–8.3)
TOTAL PROTEIN: 7.9 G/DL (ref 6.4–8.3)
TRICHOMONAS: ABNORMAL
TRIGL SERPL-MCNC: 133 MG/DL
TRIGL SERPL-MCNC: 133 MG/DL
TSH SERPL DL<=0.05 MIU/L-ACNC: 0.87 MIU/L (ref 0.3–5)
TURBIDITY: CLEAR
URINE HGB: NEGATIVE
UROBILINOGEN, URINE: NORMAL
VITAMIN D 25-HYDROXY: 66.4 NG/ML (ref 30–100)
VLDLC SERPL CALC-MCNC: NORMAL MG/DL (ref 1–30)
VLDLC SERPL CALC-MCNC: NORMAL MG/DL (ref 1–30)
WBC # BLD: 8 K/UL (ref 3.5–11)
WBC # BLD: ABNORMAL 10*3/UL
WBC UA: ABNORMAL /HPF
YEAST: ABNORMAL

## 2021-10-20 PROCEDURE — 81001 URINALYSIS AUTO W/SCOPE: CPT

## 2021-10-20 PROCEDURE — 84443 ASSAY THYROID STIM HORMONE: CPT

## 2021-10-20 PROCEDURE — 82043 UR ALBUMIN QUANTITATIVE: CPT

## 2021-10-20 PROCEDURE — 83036 HEMOGLOBIN GLYCOSYLATED A1C: CPT

## 2021-10-20 PROCEDURE — 80061 LIPID PANEL: CPT

## 2021-10-20 PROCEDURE — 83721 ASSAY OF BLOOD LIPOPROTEIN: CPT

## 2021-10-20 PROCEDURE — 82570 ASSAY OF URINE CREATININE: CPT

## 2021-10-20 PROCEDURE — 80053 COMPREHEN METABOLIC PANEL: CPT

## 2021-10-20 PROCEDURE — 99212 OFFICE O/P EST SF 10 MIN: CPT

## 2021-10-20 PROCEDURE — 87086 URINE CULTURE/COLONY COUNT: CPT

## 2021-10-20 PROCEDURE — 36415 COLL VENOUS BLD VENIPUNCTURE: CPT

## 2021-10-20 PROCEDURE — 82306 VITAMIN D 25 HYDROXY: CPT

## 2021-10-20 PROCEDURE — 84439 ASSAY OF FREE THYROXINE: CPT

## 2021-10-20 PROCEDURE — 85025 COMPLETE CBC W/AUTO DIFF WBC: CPT

## 2021-10-20 NOTE — PROGRESS NOTES
Diabetic Medication Management   [address] [place] [place], [address] [address] [facility]  Phone: [phone]  Fax: [phone]    NAME: [facility] RECORD NUMBER:  [id number]  AGE: 60 y.o. GENDER: female  : 1955  EPISODE DATE:  10/20/2021       Ms. Cassie Gray was referred to [address] [place] [address] Medication Management Services by Dr. Rosie Ferrara, Special instructions include: follow up every 3 months with physician in office     Goals per referral:   Fasting blood glucose:   Peak postprandial glucose: < 180  A1C: < 7%    Other goals:  Blood pressure goal: 130/80    Weight loss goal (~10%): Target weight  200 to be reached by date: 2021 (3-6 months)  Physical Activity goal:    15 minutes 3 times per week to be reached by date: 2021 (3-6 months)  Smoking Cessation   Quit Date: never smoked  Cholesterol at target:Yes    Date: 2021  Annual eye exam:    Date: 2021  Comprehensive annual foot exam:   Date: 3/16/21    Annual urine Albumin and serum creatinine:   Date:  10/20/21 SrCr  0.88 mg/dL; microalbumin 53 mg/L    Patient Specific Goals:  1. Lower blood sugars  2. Lower A1C    Subjective   Ms. Cassie Gray is a 60 y.o. female here for the Diabetes Service for self-management education, medication review including over the counter medications and herbal products, overall wellbeing assessment, transition of care and any needed adjustments with updates and recommendations communicated to the referring physician. Patient's name and  verified. Patient visit in person in office. Patient Findings:  Patient had stomach pain/nausea/vomitting/diarrhea on 10/17. Patient felt better but was weak and tired on 10/18. Thinks may have been the metformin that she took on an empty stomach. Patient reports missing her metformin Mason 10/17 evening and all day on Monday 10/18. Also reports missing insulin on 10/17 and 10/18.       Patient saw Dr. Rosie Ferrara (endocrinologist) and got refills on all diabetic medications and supplies. Patient had POC A1c done in office 10/12 reported at 7.6. Has blood work to get done today for lipids, SrCr, microalbumin and A1c. Patient is fasting and plans to get right after today's visit. Patient's blood sugar in office today is 181 (fasting)    Patient has Glucerna's shakes at home but reports not having many lately. Started a group at [facility] on Mondays and thinks throwing her off. Admits to not eating correctly. Skipping meals and not eating correctly. Was out of groceries but went on 10/14. Has meals on wheels so never out of food. Trulicity dose increased back to 3mg per patient at endocrinology appt. Patient has been walking more - walking to stores and around. Bike still in kitchen and not convenient to use. Patient saw DR. Aneudy Juarez and states he was going to order CPAP machine. Has not heard as of yet. Encouraged to call and check on where they are in the process. Patient concerned about recall on tubing but reassured her they would not give her any product involved in a recall. Discussed need to use CPAP and how dangerous it can be to her health to not use if she needs. Patient has next appt with endocrinology in January 2022. Patient states endocrinology was happy with her glucose control currently and did not want to make any changes. Patient's blood work came back. Patients LDL 86 but goal of less than 100 for patient with ASCVD calculated at 10%. Patient already on high intensity statin. SrCr and microalbumin has improved. A1c done via venous blood draw reported at 7.9.         []  Missed doses   []  Emergency Room Visit    []  Medication changes  []  Hospitalization   []  Diet changes   []  Acute illness   []  Activity changes      Symptoms of hypoglycemia -     [x]  None   []  Shakiness    []  Lightheadedness or dizziness   []  Confusion      []  Sweating   []  Other       Symptoms of hyperglycemia   [x]  None   []  Frequent urination    []  Increased thirst   []  Other    Medication adverse reactions   [x]  None   []  Diarrhea / Nausea / Vomiting / Constipation / flatulence   []  Hypertension   []  Peripheral edema     []  Signs of infection   []  Headache   []  Vision changes   []  Increased cholesterol    []  Weight gain   []  Change in renal function   []  Increased potassium  []  Other  Comments:      If recent hospital admission / ED visit, was this related to Diabetes No:hypotension Discharge date July 2020    Objective     PMHx:    Past Medical History:   Diagnosis Date    Anxiety     Asthma     Common cold     [facility]chronic obstructive [facility] disease) ([facility]     Depression     Diabetes mellitus ([facility]     Emphysema     Fatty liver     H/O: pneumonia FEB. 2014    Hiatal hernia     Hyperlipidemia     Hypertension     Mitral valve prolapse     Psychosis (HCC)     SOB (shortness of breath) on exertion     Spinal headache     Stroke ([facility] 2013    Mild Lt side    Upper respiratory symptom        Social History:    Social History     Tobacco Use    Smoking status: Passive Smoke Exposure - Never Smoker    Smokeless tobacco: Never Used    Tobacco comment: socially smoked when playing cards   Substance Use Topics    Alcohol use: No     Alcohol/week: 0.0 standard drinks     Comment: socially       Pertinent Labs:    Lab Results   Component Value Date    LABA1C 7.9 (H) 10/20/2021    LABA1C 7.1 07/15/2021    LABA1C 7.7 03/16/2021     Lab Results   Component Value Date    CHOL 171 10/20/2021    TRIG 133 10/20/2021    HDL 58 10/20/2021    LDLCALC 59 02/12/2020     Lipid panel on 12/16/2020  Cholesterol 135  HDL 50  LDL 55  Triglyceride 150      Lab Results   Component Value Date    CREATININE 0.88 10/20/2021    BUN 20 10/20/2021     10/20/2021    K 5.0 10/20/2021     10/20/2021    CO2 25 10/20/2021     CrCl calculated at 66ml/min      Lab Results   Component Value RESPIMAT) 2.5 MCG/ACT AERS inhaler Inhale 2 puffs into the lungs daily    Historical Provider, MD   escitalopram (LEXAPRO) 20 MG tablet Take 20 mg by mouth daily     Historical Provider, MD   aspirin 81 MG chewable tablet Take 81 mg by mouth    Historical Provider, MD   metoprolol succinate (TOPROL XL) 50 MG extended release tablet Take 50 mg by mouth daily  1/12/18   Historical Provider, MD   budesonide-formoterol (SYMBICORT) 160-4.5 MCG/ACT AERO 2 puffs twice a day 5/1/18   Historical Provider, MD   Cholecalciferol (VITAMIN D3) 2000 units CAPS Take by mouth daily    Historical Provider, MD   FLUARIX QUADRIVALENT 0.5 ML injection inject 0.5 milliliter intramuscularly 8/4/17   Historical Provider, MD   losartan (COZAAR) 50 MG tablet Take 50 mg by mouth daily  10/25/17   Historical Provider, MD   brexpiprazole (REXULTI) 4 MG TABS tablet Take 2 mg by mouth daily     Historical Provider, MD   rosuvastatin (CRESTOR) 40 MG tablet Take 40 mg by mouth every evening    Historical Provider, MD   montelukast (SINGULAIR) 10 MG tablet Take 10 mg by mouth nightly    Historical Provider, MD   pantoprazole (PROTONIX) 40 MG tablet Take 1 tablet by mouth daily 7/20/16   Carolynn Koch PA-C   loratadine (CLARITIN) 10 MG tablet Take 10 mg by mouth daily as needed (allergies)     Historical Provider, MD   albuterol (PROVENTIL) (2.5 MG/3ML) 0.083% nebulizer solution Take 2.5 mg by nebulization every 6 hours as needed for Wheezing.     Historical Provider, MD       Immunizations:   Most Recent Immunizations   Administered Date(s) Administered    COVID-19, Moderna, PF, 100mcg/0.5mL 04/06/2021    Influenza Virus Vaccine 11/20/2015    Pneumococcal Conjugate 13-valent (Cesar Keller) 11/20/2015       Home Blood Glucose Results:     Blood glucose trends noted:      Date  Fasting  Before lunch    After lunch   Before dinner  After dinner Bedtime  10/20  181  10/19  170  193  10/18  120  10/17  138  10/16  105       127  10/15  119       101  10/14  100          189  10/13            226  10/12         155  10/11         192  10/10  91  10/9  94  110  10/8    79     192  10/7  117  107     171  10/6  166    10/5  104  105     115  10/4  105  163     132  10/3  136  121  10/2    142     181  10/1  115  118     154  9/30  92  9/29  154       138  9/28  244*  120            Overall patients blood sugars are similar to last visit. Many within range but also many above goal at all times (fasting, before lunch and before dinner)  A1c has increased to 7.6/7.9. Patient wishes to get A1c below 7 but expresses frustration. Patient has a lot f stress in her life and stress does seem to contribute to her elevated blood sugars. Patient has started going to groups to help work thru some mental stress but reality states this patient will continue to deal with stress. In addition, patient relies on others to get to the grocery store and does not have a consistent supply of healthy food. Discussed increasing insulin dose as patient is already on high dose GLP-1, does not tolerate SGLT2 inhibitors, on maximum dose metformin. Patient reports some discomfort with insulin injections and would prefer to not increase insulin dose. Discussed possibly being happy with A1c below 8 while still doing what she can to control diet and increase exercise to lower A1c. Patient would like to try to work on increased exercise with goal of 30 minutes three days a week MWF. Assessment     A1c at goal: No and increasing at 7.9  Blood Pressure at goal: Yes 120/70 on 7/15/2021  Weight at goal: No   Physical activity: not consistently. Physical activity at goal: no  Smoking Cessation: Yes  Cholesterol at target: Yes: LDL 86  Annual eye exam completed: Yes April 2021  Comprehensive Foot Exam Completed:  Yes  Annual urine albumin and serum creatinine: Yes    Statin: Yes    Appropriate?: Yes  Changes made:     ACE/ARB: Yes  Appropriate?: Yes  Changes made:     Aspirin: Yes  Appropriate?: Yes  Changes made:     Eating patterns:    []  My plate    []  Mediterranean diet   []  Low sodium   []  DASH diet   []  Portion control   []  Reduced calorie    []  Fast food / Restaurant  []  High glycemic index foods   []  Sugary beverages   [x]  Other     Comment: see above    Current Medications Affecting Diabetes:  Dulaglutide 3mg subQ once weekly on Wednesdays  Insulin glargine 65 units subQ each evening  Metformin XR 1000mg in the morning and 1000 mg XR in the evening       Compliant: Yes     Barriers to medication therapy: No     Medications attempted in the past:  Metformin about 3 years ago with some diarrhea. Unsure if regular or extended release. Tolerating ER well. SGLT2 Kristie Adan) frequent yeast infections. Recent exacerbations / new problems:       Last office dictation reviewed: yes        type 2 DM under worsening control as evidenced by A1C of 7.9 on 10/20/21    Plan   Continue Insulin glargine (Lantus)  65 Units subQ each evening. Pt to call the clinic with blood sugars <70 or >200    Continue Dulaglutide (Trulicity) 3mg subQ once weekly on Wednesdays. Continue Metformin XR 1000mg in the morning and 1000 mg XR in the evening    Check blood sugars at least three times daily and document in sugar log. Patient to reece down whenever she eats something she feels is a treat or splurg. Encouraged her to write down what happened the meal prior for any blood sugars below 70 or above 200. Bring in glucose log to every appt. Use exercise bike or do light weight training with goal of 130 minutes Mon/Wed and Fri.     Physician Follow-up:  Every 3 months     Medication Management Follow-up:   Diabetes Service   4 weeks    Electronically signed by Charlene Jacobs RPH on 10/20/2021 at 8:35 PM     For Pharmacy [address] [place] [address] in place: Yes  Recommendation Provided To: Patient/Caregiver: 2 via In person  Intervention Detail: Adherence Monitorin and Lab(s) Ordered  Intervention Accepted By: Patient/Caregiver: 2  Time Spent (min): 45            Satish Sanches RPH,Pharm. D,, BCPS, CACP  10/20/2021  8:35 PM

## 2021-10-21 LAB
CULTURE: NORMAL
Lab: NORMAL
SPECIMEN DESCRIPTION: NORMAL

## 2021-11-17 ENCOUNTER — TELEPHONE (OUTPATIENT)
Dept: PHARMACY | Age: 66
End: 2021-11-17

## 2021-11-17 ENCOUNTER — HOSPITAL ENCOUNTER (OUTPATIENT)
Dept: PHARMACY | Age: 66
Setting detail: THERAPIES SERIES
Discharge: HOME | End: 2021-11-17
Payer: COMMERCIAL

## 2021-11-17 VITALS — BODY MASS INDEX: 36.48 KG/M2 | WEIGHT: 219.2 LBS

## 2021-11-17 PROCEDURE — 99212 OFFICE O/P EST SF 10 MIN: CPT

## 2021-11-17 RX ORDER — INSULIN GLARGINE 100 [IU]/ML
65 INJECTION, SOLUTION SUBCUTANEOUS NIGHTLY
Qty: 5 PEN | Refills: 4 | OUTPATIENT
Start: 2021-11-17 | End: 2022-08-24 | Stop reason: SDUPTHER

## 2021-11-17 RX ORDER — BACLOFEN 20 MG
1 TABLET ORAL DAILY
COMMUNITY

## 2021-11-17 NOTE — TELEPHONE ENCOUNTER
RX for insulin glargine (Lantus) called to [facility]. Pharmacy will give patient 5 pens as they do not break packs of 5 pens up but understand this will not be a month supply and patient will need to get filled more often. Four (4) refills added. Satish Sanches RP,Pharm. D,, BCPS, [facility]  11/17/2021  3:09 PM

## 2021-11-17 NOTE — PROGRESS NOTES
Diabetic Medication Management   [facility]: ELISABETH MONTANO    [address]. [place], [address] [address] [facility]  Phone: [phone]  Fax: [phone]    NAME: [facility] RECORD NUMBER:  [id number]  AGE: 60 y.o. GENDER: female  : 1955  EPISODE DATE:  2021       Ms. Rai Llanos was referred to [facility] Medication Management Services by Dr. Juan Motta, Special instructions include: follow up every 3 months with physician in office     Goals per referral:   Fasting blood glucose:   Peak postprandial glucose: < 180  A1C: < 7%    Other goals:  Blood pressure goal: 130/80    Weight loss goal (~10%): Target weight  200 to be reached by date: 2022 (3-6 months)  Physical Activity goal:    15 minutes 3 times per week to be reached by date: Dec 2021 (3-6 months)  Smoking Cessation   Quit Date: never smoked  Cholesterol at target:Yes    Date: 2021  Annual eye exam:    Date: 2021  Comprehensive annual foot exam:   Date: 3/16/21    Annual urine Albumin and serum creatinine:   Date:  10/20/21 SrCr  0.88 mg/dL; microalbumin 53 mg/L    Patient Specific Goals:  1. Lower blood sugars  2. Lower A1C    Subjective   Ms. Rai Llanos is a 60 y.o. female here for the Diabetes Service for self-management education, medication review including over the counter medications and herbal products, overall wellbeing assessment, transition of care and any needed adjustments with updates and recommendations communicated to the referring physician. Patient's name and  verified. Patient visit in person in office. Patient Findings:  Patient reports significant amount of stress recently as working with paperwork needed to have reduced utility rates. Does feel things are getting better and she is getting more on the right tract. Patient still needs to get the flu vaccine and covid booster. Encouraged her to get flu vaccine sooner rather than later.     Patient reports she did take her insulin late one day as fell asleep. Patient also admits she may have missed a couple doses of insulin as just feel overwhelmed. Also reports some shaking and tongue clicking which she thinks is due to brexpiprazole. Her provider reduced her dose in order to try to help but patient stressed that her psychiatric illness will suffer for it. Reports not feeling with it especially the last few weeks. Does admit to running out of some medications (other than diabetes meds) and days after that she seems to have really been out of it not checking blood sugar and possibly not taking medication. Patient made spaghetti and thinks it did push her blood sugar up but that was all she had. She uses whole wheat noodles when she can. Does not think she has missed many meals but reports having one larger meal a day and two smaller meals a day. Not getting meals on wheels consistently recently this last week. Hoping they are there today. Patient reports having $146 left on her food stamps for November and therefore able to go get some healthier foods. Discussed healthy options such as vegetables and lean meats. Plans to have oatmeal more as well. Patient eating a lot of tomato based foods which can increase blood sugar. Discussed this and educated patient to monitor blood sugar after eating tomato products to see effect. Patient has next appt with endocrinology in January 13, 2022. Patient states endocrinology was happy with her glucose control at previous appt in October and did not want to make any changes. Does think she may have missed a dose of Trulicity as more in box than should be. Has not been drinking any Glucerna shakes and thinks they make her sick to her stomach so she doesn't think she will do any more. Patient has not been doing intentional exercise. Does go for some walks but not consistent. Thinking about getting a rolling walker to help stabilize her and hold groceries for her. Bike still in kitchen but open now and thinks she could possibly start using. Still has not gotten the CPAP machine as bill has not been paid by insurance. Called and found out she does not owe anything but waiting for final word from insurance. [x]  Missed doses   []  Emergency Room Visit    []  Medication changes  []  Hospitalization   [x]  Diet changes   []  Acute illness   []  Activity changes      Symptoms of hypoglycemia -     [x]  None   []  Shakiness    []  Lightheadedness or dizziness   []  Confusion      []  Sweating   []  Other       Symptoms of hyperglycemia   [x]  None   []  Frequent urination    []  Increased thirst   []  Other    Medication adverse reactions   [x]  None   []  Diarrhea / Nausea / Vomiting / Constipation / flatulence   []  Hypertension   []  Peripheral edema     []  Signs of infection   []  Headache   []  Vision changes   []  Increased cholesterol    []  Weight gain   []  Change in renal function   []  Increased potassium  []  Other  Comments:      If recent hospital admission / ED visit, was this related to Diabetes No:hypotension Discharge date July 2020    Objective     PMHx:    Past Medical History:   Diagnosis Date    Anxiety     Asthma     Common cold     [facility]     Depression     Diabetes mellitus ([facility]     Emphysema     Fatty liver     H/O: pneumonia FEB. 2014    Hiatal hernia     Hyperlipidemia     Hypertension     Mitral valve prolapse     Psychosis (HCC)     SOB (shortness of breath) on exertion     Spinal headache     Stroke ([facility] 2013    Mild Lt side    Upper respiratory symptom        Social History:    Social History     Tobacco Use    Smoking status: Passive Smoke Exposure - Never Smoker    Smokeless tobacco: Never Used    Tobacco comment: socially smoked when playing cards   Substance Use Topics    Alcohol use: No     Alcohol/week: 0.0 standard drinks     Comment: socially       Pertinent Labs:    Lab Results   Component Value Date    LABA1C 7.9 (H) 10/20/2021    LABA1C 7.1 07/15/2021    LABA1C 7.7 03/16/2021     Lab Results   Component Value Date    CHOL 171 10/20/2021    TRIG 133 10/20/2021    HDL 58 10/20/2021    LDLCALC 59 02/12/2020     Lipid panel on 12/16/2020  Cholesterol 135  HDL 50  LDL 55  Triglyceride 150      Lab Results   Component Value Date    CREATININE 0.88 10/20/2021    BUN 20 10/20/2021     10/20/2021    K 5.0 10/20/2021     10/20/2021    CO2 25 10/20/2021     CrCl calculated at 66ml/min      Lab Results   Component Value Date    ALT 28 10/20/2021       Weight:  Wt Readings from Last 3 Encounters:   11/17/21 219 lb 3.2 oz (99.4 kg)   10/20/21 216 lb (98 kg)   09/08/21 217 lb (98.4 kg)       Current medications:  Prior to Admission medications    Medication Sig Start Date End Date Taking? Authorizing Provider   dorzolamide (TRUSOPT) 2 % ophthalmic solution Place 1 drop into both eyes 2 times daily    Historical Provider, MD   Dulaglutide (TRULICITY) 3 KW/8.0BB SOPN Inject 3 mg into the skin once a week on Wednesdays 12/17/20   Tim Billings   brimonidine [facility]) 0.2 % ophthalmic solution Place 1 drop into both eyes 2 times daily 12/10/20   Historical Provider, MD   nitroGLYCERIN (NITROSTAT) 0.4 MG SL tablet Place 0.4 mg under the tongue every 5 minutes as needed for Chest pain up to max of 3 total doses. If no relief after 1 dose, call 911. Historical Provider, MD   Lancets MISC Use to check blood sugar 3-4 times daily (before meals and at bedtime) and as needed for symptoms. 10/21/20   Tim Billings   insulin glargine (LANTUS SOLOSTAR) 100 UNIT/ML injection pen Inject 48 Units into the skin nightly  Patient taking differently: Inject 65 Units into the skin nightly  10/7/20   Tim Billings   Insulin Pen Needle 32G X 4 MM MISC Use to inject Trulicity once a week on Wednesdays and Lantus once a day.  9/9/20   Kadeem Barger 2.5 mg by nebulization every 6 hours as needed for Wheezing. Historical Provider, MD       Immunizations:   Most Recent Immunizations   Administered Date(s) Administered    Influenza Virus Vaccine 11/20/2015    Pneumococcal Conjugate 13-valent (Amna Schwarz) 11/20/2015       Home Blood Glucose Results:     Blood glucose trends noted:      Date  Fasting  Before lunch    After lunch   Before dinner  After dinner    Bedtime  11/17  164  11/16  212  141     283  11/15  160       146  11/14 11/13  186  142     166  11/12  162  168     124  11/11    169  11/10  133  135     186  11/9  230  149  11/8    129  11/7    86     157  11/6  145       161  11/5  190  168  11/4  191       471/452  11/3  11/2  11/1  177  10/31  87       180  10/30    167  10/29    174  10/28  154  10/27  10/26  177       141    Overall patients blood sugars are less controlled / elevated as compared to last visit. Likely due to patient missing diabetes medications and not following healthy diet. Patient still hesitant to increase insulin dose and writer also hesitant as think the majority of patient's increase was due to noncompliance with diet and medications. Concerned if we increased insulin and patient continued to have episodes of \"being out of it\" could result in hypoglycemia which would be of more concern acutely than elevated BS. As patient feels she is doing better will give her time to get back on tract with compliance with medications and diet. If BS still elevated will adjust at that time. Offered to see patient in about 2 weeks but patient thinks she wants a month to get back in line and document blood sugars regularly. Assessment     A1c at goal: No and increasing at 7.9  Blood Pressure at goal: Yes 118/70 on 10/12/2021  Weight at goal: No   Physical activity: not consistently. Physical activity at goal: no  Smoking Cessation: Yes  Cholesterol at target: Yes: LDL 86  Annual eye exam completed:  Yes April 2021  Comprehensive Foot Exam Completed: Yes  Annual urine albumin and serum creatinine: Yes    Statin: Yes    Appropriate?: Yes  Changes made:     ACE/ARB: Yes  Appropriate?: Yes  Changes made:     Aspirin: Yes  Appropriate?: Yes  Changes made:     Eating patterns:    []  My plate    []  Mediterranean diet   []  Low sodium   []  DASH diet   []  Portion control   []  Reduced calorie    []  Fast food / Restaurant  []  High glycemic index foods   []  Sugary beverages   [x]  Other     Comment: see above    Current Medications Affecting Diabetes:  Dulaglutide 3mg subQ once weekly on Wednesdays  Insulin glargine 65 units subQ each evening  Metformin XR 1000mg in the morning and 1000 mg XR in the evening       Compliant: Yes     Barriers to medication therapy: No     Medications attempted in the past:  Metformin about 3 years ago with some diarrhea. Unsure if regular or extended release. Tolerating ER well. SGLT2 Ethyl Brown) frequent yeast infections. Recent exacerbations / new problems:       Last office dictation reviewed: yes        type 2 DM under worsening control as evidenced by A1C of 7.9 on 10/20/21    Plan   Work on being more compliant with diet and medications. Continue Insulin glargine (Lantus)  65 Units subQ each evening. Pt to call the clinic with blood sugars <70 or >200    Continue Dulaglutide (Trulicity) 3mg subQ once weekly on Wednesdays. Continue Metformin XR 1000mg in the morning and 1000 mg XR in the evening    Check blood sugars at least three times daily and document in sugar log. Patient to reece down whenever she eats something she feels is a treat or splurg. Use exercise bike or do light weight training with goal of 130 minutes Mon/Wed and Fri.     Physician Follow-up:  Every 3 months     Medication Management Follow-up:   Diabetes Service   4 weeks    Electronically signed by Warren Arita RPH on 11/17/2021 at 10:51 AM     For Pharmacy [id number] [facility] in place:  Yes  Recommendation Provided To: Patient/Caregiver: 2 via In person  Intervention Detail: Adherence Monitorin and Lab(s) Ordered  Intervention Accepted By: Patient/Caregiver: 2  Time Spent (min): 45            Satish Sanches RPH,Pharm. D,, BCPS, [facility]  2021  10:51 AM

## 2021-12-15 ENCOUNTER — HOSPITAL ENCOUNTER (OUTPATIENT)
Dept: PHARMACY | Age: 66
Setting detail: THERAPIES SERIES
Discharge: HOME | End: 2021-12-15
Payer: COMMERCIAL

## 2021-12-15 VITALS — BODY MASS INDEX: 36.71 KG/M2 | WEIGHT: 220.6 LBS

## 2021-12-15 PROCEDURE — 99213 OFFICE O/P EST LOW 20 MIN: CPT

## 2021-12-15 RX ORDER — FAMOTIDINE 20 MG/1
20 TABLET, FILM COATED ORAL 2 TIMES DAILY
COMMUNITY
End: 2022-06-01

## 2021-12-15 NOTE — PROGRESS NOTES
Diabetic Medication Management   [facility]    [address] [place] [address]. [place], [address] [address] [facility]  Phone: [phone]  Fax: [phone]    NAME: Natty [facility] RECORD NUMBER:  [id number]  AGE: 73 y.o. GENDER: female  : 1955  EPISODE DATE:  12/15/2021       Ms. Rai Llanos was referred to [facility] Medication Management Services by Dr. Juan Motta, Special instructions include: follow up every 3 months with physician in office     Goals per referral:   Fasting blood glucose:   Peak postprandial glucose: < 180  A1C: < 7%    Other goals:  Blood pressure goal: 130/80    Weight loss goal (~10%): Target weight  200 to be reached by date: 2022 (3-6 months)  Physical Activity goal:    15 minutes 3 times per week to be reached by date: Dec 2021 (3-6 months)  Smoking Cessation   Quit Date: never smoked  Cholesterol at target:Yes    Date: 2021  Annual eye exam:    Date: 2021  Comprehensive annual foot exam:   Date: 3/16/21    Annual urine Albumin and serum creatinine:   Date:  10/20/21 SrCr  0.88 mg/dL; microalbumin 53 mg/L    Patient Specific Goals:  1. Lower blood sugars  2. Lower A1C    Subjective   Ms. Rai Llanos is a 73 y.o. female here for the Diabetes Service for self-management education, medication review including over the counter medications and herbal products, overall wellbeing assessment, transition of care and any needed adjustments with updates and recommendations communicated to the referring physician. Patient's name and  verified. Patient visit in person in office. Patient Findings:    Patient fell on  while outside hitting left leg/hip and right shoulder. Has some bruising on left calf. Did hit her head when fell against garage - did not loose consciousness. Son was there and say patient fall. Patient got up on her own with minimal help from son.   Patient then started to have diarrhea (like water per patient) on  that resolved very early 12/14. No further diarrhea since early on 12/14 with resuming normal stools per patient. Patient reports hip still hurts but gradually improving. Patient scheduled for Dexa scan in January. Patient has veges (frozen) at home but knows she has not been eating well. Has not been eating salads and not much meat. Lots of noodles and ramen noodles etc.  Plans to go to grocery store in next day or two. Has roast and veges as well as homemade vege soup to make. When glucose log reviewed patient documented eating pumpkin pie multiple times as well as reports sticky buns. Patient only gets to grocery store once a month so has trouble having fresh foods. Discussed options that she would be able to keep around longer. Patient tries to be perfect with diet but then when unable to be perfect seems to just give up and eat whatever. Discussed trying to not be so hard on herself and just try to be as good as possible. Patient had two episodes of hypoglycemia of (66 and 71) in last few weeks. Both episodes occurred after patient skipped a couple meals in a row. Discussed importance of not skipping meals. Patient got paperwork for utility reduced payments taken care of. Patient very stressed over holidays and having enough money to buy food if she has to buy food to celebrate with family. Suggested she ask family to bring dishes or buy food and she cook. Patient also concerned for health of sister. Patient stopped Protonix and now on famotidine as instructed by provider. Patient has gotten her flu vaccine but still needs covid 19 booster and pneumococcal vaccine. Helped patient schedule her covid booster at [employer] on [address] on Friday 12/17 at 12:40pm due to patient not having computer at home. Patient has next appt with endocrinology in January 13, 2022.   Patient states endocrinology was happy with her glucose control at previous appt in October and did not want to make any changes. On Mondays she walks to [place] and then to store to get diet pop. Not using exercise bike at all but states she wants to. Encouraged her to use a little bit (start with 5-10 min) every day. Wants to get walker as thinks this will help her walk more so she has space to sit down when gets tired. Still waiting on CPAP machine. States insurance still has not paid it off. Encouraged patient to call to find out where she stands. Gave patient number of pulmonary rehab dept. Patient knows she needs to eat better and wants to try. Discussed increasing basal insulin and patient very hesitant. Discussed that other than hypoglycemia that occurs when she skips meals she has been elevated. Patient reports not missing any doses of diabetes medications recently. Does need refills of metformin and glucose strips. Called and requested from [facility] on [address]      Patient reports some pain with insulin injection. Had patient describe/explain her process for giving herself insulin injections. Patient appears to be doing everything correct other than does not pinch and hold skin during injections. Patient reports afraid she may inject her hand. Suggested she use a mirror and she indicates she can do this.        []  Missed doses   []  Emergency Room Visit    []  Medication changes  []  Hospitalization   [x]  Diet changes   []  Acute illness   []  Activity changes      Symptoms of hypoglycemia -     []  None   [x]  Shakiness    []  Lightheadedness or dizziness   []  Confusion      []  Sweating   []  Other       Symptoms of hyperglycemia   [x]  None   []  Frequent urination    []  Increased thirst   []  Other    Medication adverse reactions   [x]  None   []  Diarrhea / Nausea / Vomiting / Constipation / flatulence   []  Hypertension   []  Peripheral edema     []  Signs of infection   []  Headache   []  Vision changes   []  Increased cholesterol    []  Weight gain   []  Change in renal function   [] Increased potassium  []  Other  Comments:      If recent hospital admission / ED visit, was this related to Diabetes No:hypotension Discharge date July 2020    Objective     PMHx:    Past Medical History:   Diagnosis Date    Anxiety     Asthma     Common cold     COPD [facility]chronic obstructive pulmonary disease) ([facility]     Depression     Diabetes mellitus ([facility]     Emphysema     Fatty liver     H/O: pneumonia FEB. 2014    Hiatal hernia     Hyperlipidemia     Hypertension     Mitral valve prolapse     Psychosis (HCC)     SOB (shortness of breath) on exertion     Spinal headache     Stroke ([facility] 2013    Mild Lt side    Upper respiratory symptom        Social History:    Social History     Tobacco Use    Smoking status: Passive Smoke Exposure - Never Smoker    Smokeless tobacco: Never Used    Tobacco comment: socially smoked when playing cards   Substance Use Topics    Alcohol use: No     Alcohol/week: 0.0 standard drinks     Comment: socially       Pertinent Labs:    Lab Results   Component Value Date    LABA1C 7.9 (H) 10/20/2021    LABA1C 7.1 07/15/2021    LABA1C 7.7 03/16/2021     Lab Results   Component Value Date    CHOL 171 10/20/2021    TRIG 133 10/20/2021    HDL 58 10/20/2021    LDLCALC 59 02/12/2020     Lipid panel on 12/16/2020  Cholesterol 135  HDL 50  LDL 55  Triglyceride 150      Lab Results   Component Value Date    CREATININE 0.88 10/20/2021    BUN 20 10/20/2021     10/20/2021    K 5.0 10/20/2021     10/20/2021    CO2 25 10/20/2021     CrCl calculated at 66ml/min      Lab Results   Component Value Date    ALT 28 10/20/2021       Weight:  Wt Readings from Last 3 Encounters:   12/15/21 220 lb 9.6 oz (100.1 kg)   11/17/21 219 lb 3.2 oz (99.4 kg)   10/20/21 216 lb (98 kg)       Current medications:  Prior to Admission medications    Medication Sig Start Date End Date Taking?  Authorizing Provider   Magnesium Oxide 500 MG TABS Take 1 tablet by mouth daily    Historical Provider, MD insulin glargine (LANTUS SOLOSTAR) 100 UNIT/ML injection pen Inject 65 Units into the skin nightly 11/17/21   Debora Santana   dorzolamide (TRUSOPT) 2 % ophthalmic solution Place 1 drop into both eyes 2 times daily    Historical Provider, MD   Dulaglutide (TRULICITY) 3 ER/3.4AR SOPN Inject 3 mg into the skin once a week on Wednesdays 12/17/20   Joe Garcia   brimonidine [facility]) 0.2 % ophthalmic solution Place 1 drop into both eyes 2 times daily 12/10/20   Historical Provider, MD   nitroGLYCERIN (NITROSTAT) 0.4 MG SL tablet Place 0.4 mg under the tongue every 5 minutes as needed for Chest pain up to max of 3 total doses. If no relief after 1 dose, call 911. Historical Provider, MD   Lancets MISC Use to check blood sugar 3-4 times daily (before meals and at bedtime) and as needed for symptoms. 10/21/20   Joe Garcia   Insulin Pen Needle 32G X 4 MM MISC Use to inject Trulicity once a week on Wednesdays and Lantus once a day. 9/9/20   Joe Garcia   metFORMIN (GLUCOPHAGE XR) 500 MG extended release tablet Take 2 tablets by mouth 2 times daily (with meals) 9/9/20   Joe Garcia   blood glucose monitor strips Test 3-4 times a day & as needed for symptoms of irregular blood glucose. Dispense sufficient amount for indicated testing frequency plus additional to accommodate PRN testing needs.  9/9/20   Debora Santana   albuterol sulfate  (90 Base) MCG/ACT inhaler Inhale 2 puffs into the lungs every 6 hours as needed for Wheezing    Historical Provider, MD   tiotropium (SPIRIVA RESPIMAT) 2.5 MCG/ACT AERS inhaler Inhale 2 puffs into the lungs daily    Historical Provider, MD   escitalopram (LEXAPRO) 20 MG tablet Take 20 mg by mouth daily     Historical Provider, MD   aspirin 81 MG chewable tablet Take 81 mg by mouth    Historical Provider, MD   metoprolol succinate (TOPROL XL) 50 MG extended release tablet Take 50 mg by mouth daily 1/12/18   Historical Provider, MD   budesonide-formoterol (SYMBICORT) 160-4.5 MCG/ACT AERO 2 puffs twice a day 5/1/18   Historical Provider, MD   Cholecalciferol (VITAMIN D3) 2000 units CAPS Take by mouth daily    Historical Provider, MD   losartan (COZAAR) 50 MG tablet Take 50 mg by mouth daily  10/25/17   Historical Provider, MD   brexpiprazole (REXULTI) 0.25 MG TABS tablet Take 0.25 mg by mouth daily     Historical Provider, MD   rosuvastatin (CRESTOR) 40 MG tablet Take 40 mg by mouth every evening    Historical Provider, MD   montelukast (SINGULAIR) 10 MG tablet Take 10 mg by mouth nightly    Historical Provider, MD   pantoprazole (PROTONIX) 40 MG tablet Take 1 tablet by mouth daily 7/20/16   Rayne Castillo PA-C   loratadine (CLARITIN) 10 MG tablet Take 10 mg by mouth daily as needed (allergies)     Historical Provider, MD   albuterol (PROVENTIL) (2.5 MG/3ML) 0.083% nebulizer solution Take 2.5 mg by nebulization every 6 hours as needed for Wheezing. Historical Provider, MD       Immunizations:   Most Recent Immunizations   Administered Date(s) Administered    Influenza Virus Vaccine 11/20/2015    Pneumococcal Conjugate 13-valent ([facility]) 11/20/2015       Home Blood Glucose Results:     Blood glucose trends noted:    12/15  171  12/14  137       323  244  12/13    90     149  12/12    71     135  12/11  167  138     127  12/10  291  130     293  12/9  180  12/8  287  267     247  12/7  122  147     123  12/6  105  12/5  132  198  12/4  66         118  12/3  205  12/2  133       109  12/1  116  169     138  11/30  108       155  11/29 11/28  109  11/27  163  114     112  11/26  109  104     236  11/25  158  193     173     Overall patient's blood sugars continue to be elevated. Assessment     A1c at goal: No and increasing at 7.9  Blood Pressure at goal: Yes 118/70 on 10/12/2021  Weight at goal: No   Physical activity: not consistently.    Physical activity at goal: no  Smoking Cessation: Yes  Cholesterol at target: Yes: LDL 86  Annual eye exam completed: Yes April 2021  Comprehensive Foot Exam Completed: Yes  Annual urine albumin and serum creatinine: Yes    Statin: Yes    Appropriate?: Yes  Changes made:     ACE/ARB: Yes  Appropriate?: Yes  Changes made:     Aspirin: Yes  Appropriate?: Yes  Changes made:     Eating patterns:    []  My plate    []  Mediterranean diet   []  Low sodium   []  DASH diet   []  Portion control   []  Reduced calorie    []  Fast food / Restaurant  []  High glycemic index foods   []  Sugary beverages   [x]  Other     Comment: see above    Current Medications Affecting Diabetes:  Dulaglutide 3mg subQ once weekly on Wednesdays  Insulin glargine 65 units subQ each evening  Metformin XR 1000mg in the morning and 1000 mg XR in the evening       Compliant: Yes     Barriers to medication therapy: No     Medications attempted in the past:  Metformin about 3 years ago with some diarrhea. Unsure if regular or extended release. Tolerating ER well. SGLT2 Bonilla Beat) frequent yeast infections. Recent exacerbations / new problems:       Last office dictation reviewed: yes        type 2 DM under worsening control as evidenced by A1C of 7.9 on 10/20/21    Plan   Patient agrees to minimal increase in insulin dose: Increase Insulin glargine (Lantus) to 67 Units subQ each evening. Pt to call the clinic with blood sugars <70 or >200    Continue Dulaglutide (Trulicity) 3mg subQ once weekly on Wednesdays. Continue Metformin XR 1000mg in the morning and 1000 mg XR in the evening    Check blood sugars at least three times daily and document in sugar log. Patient to reece down whenever she eats something she feels is a treat or splurg. Use exercise bike with goal of 5-10 min a few days a week. Physician Follow-up:  Every 3 months     Medication Management Follow-up:   Diabetes Service   6 weeks.   Patient will see endocrinologist in 4 weeks and wants to wait until after that appt to see us. Encouraged her to call with any low blood sugars of if blood sugars remain elevated. Electronically signed by Rani Umaña RPH on 12/15/2021 at 11:44 AM     For Pharmacy Admin Tracking Only    CPA in place:  Yes  Recommendation Provided To: Patient/Caregiver: 2 via In person  Intervention Detail: Adherence Monitorin and Lab(s) Ordered  Intervention Accepted By: Patient/Caregiver: 2  Time Spent (min): 45            Satish Sanches RPH,Pharm. D,, BCPS, CACP  12/15/2021  11:44 AM

## 2021-12-17 ENCOUNTER — TELEPHONE (OUTPATIENT)
Dept: PHARMACY | Age: 66
End: 2021-12-17

## 2021-12-17 RX ORDER — METFORMIN HYDROCHLORIDE 500 MG/1
1000 TABLET, EXTENDED RELEASE ORAL 2 TIMES DAILY WITH MEALS
Qty: 360 TABLET | Refills: 1 | Status: SHIPPED | OUTPATIENT
Start: 2021-12-17 | End: 2022-03-30 | Stop reason: SDUPTHER

## 2021-12-17 NOTE — TELEPHONE ENCOUNTER
New Prescription to [facility] on [address]  Metformin  mg Tablets #360  RF 1

## 2022-01-24 ENCOUNTER — HOSPITAL ENCOUNTER (OUTPATIENT)
Dept: WOMENS IMAGING | Age: 67
Discharge: HOME | End: 2022-01-26
Payer: COMMERCIAL

## 2022-01-24 DIAGNOSIS — Z78.0 POST-MENOPAUSAL: ICD-10-CM

## 2022-01-24 DIAGNOSIS — Z12.31 ENCOUNTER FOR SCREENING MAMMOGRAM FOR MALIGNANT NEOPLASM OF BREAST: ICD-10-CM

## 2022-01-24 PROCEDURE — 77063 BREAST TOMOSYNTHESIS BI: CPT

## 2022-01-24 PROCEDURE — 77080 DXA BONE DENSITY AXIAL: CPT

## 2022-02-07 ENCOUNTER — HOSPITAL ENCOUNTER (OUTPATIENT)
Age: 67
Setting detail: SPECIMEN
Discharge: HOME | End: 2022-02-07
Payer: COMMERCIAL

## 2022-02-07 ENCOUNTER — HOSPITAL ENCOUNTER (OUTPATIENT)
Dept: PHARMACY | Age: 67
Setting detail: THERAPIES SERIES
Discharge: HOME | End: 2022-02-07
Payer: COMMERCIAL

## 2022-02-07 VITALS
WEIGHT: 217 LBS | SYSTOLIC BLOOD PRESSURE: 128 MMHG | HEART RATE: 89 BPM | DIASTOLIC BLOOD PRESSURE: 59 MMHG | BODY MASS INDEX: 36.11 KG/M2 | OXYGEN SATURATION: 97 %

## 2022-02-07 PROCEDURE — 83036 HEMOGLOBIN GLYCOSYLATED A1C: CPT

## 2022-02-07 PROCEDURE — 36415 COLL VENOUS BLD VENIPUNCTURE: CPT

## 2022-02-07 PROCEDURE — 99213 OFFICE O/P EST LOW 20 MIN: CPT

## 2022-02-07 NOTE — PROGRESS NOTES
Diabetic Medication Management   [address] [place] [place], [address] [address] [facility]uaVizal[facility] TechnologyN[facility]  Phone: [phone]  Fax: [phone]    NAME: [facility] RECORD NUMBER:  [id number]  AGE: 73 y.o. GENDER: female  : 1955  EPISODE DATE:  2022       Ms. Tabitha Mejia was referred to [facility] Management [facility] by Dr. Edinson Harrison, Special instructions include: follow up every 3 months with physician in office     Goals per referral:   Fasting blood glucose:   Peak postprandial glucose: < 180  A1C: < 7%    Other goals:  Blood pressure goal: 130/80    Weight loss goal (~10%): Target weight  200 to be reached by date: May 2022 (3-6 months)  Physical Activity goal:    15 minutes 3 times per week to be reached by date: May 2022 (3-6 months)  Smoking Cessation   Quit Date: never smoked  Cholesterol at target:Yes    Date: 2021  Annual eye exam:    Date: 2022 next 22  Comprehensive annual foot exam:   Date: 3/16/21    Annual urine Albumin and serum creatinine:   Date:  10/20/21 SrCr  0.88 mg/dL; microalbumin 53 mg/L    Patient Specific Goals:  1. Lower blood sugars  2. Lower A1C    Subjective   Ms. Tabitha Mejia is a 73 y.o. female here for the Diabetes Service for self-management education, medication review including over the counter medications and herbal products, overall wellbeing assessment, transition of care and any needed adjustments with updates and recommendations communicated to the referring physician. Patient's name and  verified. Patient visit in person in office. Patient Findings:    Patient had to cancel appt with Dr. Edinson Harrison (endocrinology) due to her furnace going out. Patient still has not rescheduled her appt as states has been too busy. Reports her street is so snowy that the [address] almost got stuck today so hesitant to schedule for fear she wont be able to get there with cab.   Suggested she call and schedule as it may be several weeks to get an appt. States she did try to call them a couple weeks ago and was told they had no one to schedule her at that time and for her to call back. Patient indicates Dr. Steven Sotomayor or provider in endocrinology office does do diabetic foot exam at each visit. Patient reports she received her CPAP machine and will have virtual session on 2/10 to go over operation of CPAP machine. BS this AM was elevated but patient reports dinner last night was Popcorn chicken, pot pie and piece of cake (carrott cake). Patient states this was a celebration for family member's birthday and not normal for her. Patient did not bring in glucose log as states she got confused with the time on glucometer. Patient brought in glucometer. Writer was able to gather BS readings from glucometer (see below) and able to change time on glucometer to correct time. Also showed patient how to change time if needed for future. Patient states no further falls. Does state her hip pain has resolved but her wrist hurts now. Unsure if she injured it or not. Has gotten both mamogram / dexa scan - states have osteoporosis. Has an appt to discuss with provider this month on 2/21. Patient went to the grocery store this past weekend and has a lot of food at home now to eat better. Made a large batch of vege soup and plans to eat for several days. Patient admits she still does not get to grocery store on regular basis and when she does not have fresh food she tends to have limited food choices and therefore eats much less healthy or good for BS. Patient had one episode of hypoglycemia of (61) in last few weeks. This episode occurred after patient skipped a couple meals in a row. Discussed importance of not skipping meals. Patient got her covid-19 booster on 12/17 and also received the flu vaccine. Patient reports not missing any doses of diabetes medications recently. Does not need any refills. Changed from Lantus to [address] as her insulin glargine formulation. Has not been able to walk due to weather. Has not been able to use exercise bike. States it is still there and cleared off so available to use. Discussed using it a couple days a week for a few minutes each time and working up to longer duration. Patient has one better with insulin injections. States less pain. Pinching and using a mirror to help make sure she injects correctly. Patient due for A1c. Drawn today. []  Missed doses   []  Emergency Room Visit    []  Medication changes  []  Hospitalization   [x]  Diet changes   []  Acute illness   []  Activity changes      Symptoms of hypoglycemia -     []  None   [x]  Shakiness    []  Lightheadedness or dizziness   []  Confusion      []  Sweating   []  Other       Symptoms of hyperglycemia   [x]  None   []  Frequent urination    []  Increased thirst   []  Other    Medication adverse reactions   [x]  None   []  Diarrhea / Nausea / Vomiting / Constipation / flatulence   []  Hypertension   []  Peripheral edema     []  Signs of infection   []  Headache   []  Vision changes   []  Increased cholesterol    []  Weight gain   []  Change in renal function   []  Increased potassium  []  Other  Comments:      If recent hospital admission / ED visit, was this related to Diabetes No:hypotension Discharge date July 2020    Objective     PMHx:    Past Medical History:   Diagnosis Date    Anxiety     Asthma     Common cold     COPD (chronic obstructive pulmonary disease) ([address]     Depression     Diabetes mellitus ([address]     Emphysema     Fatty liver     H/O: pneumonia FEB. 2014    Hiatal hernia     Hyperlipidemia     Hypertension     Mitral valve prolapse     Psychosis (HCC)     SOB (shortness of breath) on exertion     Spinal headache     Stroke ([address] 2013    Mild Lt side    Upper respiratory symptom        Social History:    Social History     Tobacco Use    Smoking status: Passive Smoke Exposure - Never Smoker    Smokeless tobacco: Never Used    Tobacco comment: socially smoked when playing cards   Substance Use Topics    Alcohol use: No     Alcohol/week: 0.0 standard drinks     Comment: socially       Pertinent Labs:    Lab Results   Component Value Date    LABA1C 7.9 (H) 10/20/2021    LABA1C 7.1 07/15/2021    LABA1C 7.7 03/16/2021     Lab Results   Component Value Date    CHOL 171 10/20/2021    TRIG 133 10/20/2021    HDL 58 10/20/2021    LDLCALC 59 02/12/2020     Lipid panel on 12/16/2020  Cholesterol 135  HDL 50  LDL 55  Triglyceride 150      Lab Results   Component Value Date    CREATININE 0.88 10/20/2021    BUN 20 10/20/2021     10/20/2021    K 5.0 10/20/2021     10/20/2021    CO2 25 10/20/2021     CrCl calculated at 66ml/min      Lab Results   Component Value Date    ALT 28 10/20/2021       Weight:  Wt Readings from Last 3 Encounters:   02/07/22 217 lb (98.4 kg)   12/15/21 220 lb 9.6 oz (100.1 kg)   11/17/21 219 lb 3.2 oz (99.4 kg)       Current medications:  Prior to Admission medications    Medication Sig Start Date End Date Taking?  Authorizing Provider   metFORMIN (GLUCOPHAGE-XR) 500 MG extended release tablet Take 2 tablets by mouth 2 times daily (with meals) 12/17/21   Flynn Collins MD   famotidine (PEPCID) 20 MG tablet Take 20 mg by mouth 2 times daily    Historical Provider, MD   Magnesium Oxide 500 MG TABS Take 1 tablet by mouth daily    Historical Provider, MD   insulin glargine (LANTUS SOLOSTAR) 100 UNIT/ML injection pen Inject 65 Units into the skin nightly 11/17/21   Sharon Santana   dorzolamide (TRUSOPT) 2 % ophthalmic solution Place 1 drop into both eyes 2 times daily    Historical Provider, MD   Dulaglutide (TRULICITY) 3 OI/2.9IR SOPN Inject 3 mg into the skin once a week on Wednesdays 12/17/20   Mehdi Solis   brimonidine [facility]) 0.2 % ophthalmic solution Place 1 drop into both eyes 2 times daily 12/10/20 Historical Provider, MD   nitroGLYCERIN (NITROSTAT) 0.4 MG SL tablet Place 0.4 mg under the tongue every 5 minutes as needed for Chest pain up to max of 3 total doses. If no relief after 1 dose, call 911. Historical Provider, MD   Lancets MISC Use to check blood sugar 3-4 times daily (before meals and at bedtime) and as needed for symptoms. 10/21/20   Alexanderandreea Kelley   Insulin Pen Needle 32G X 4 MM MISC Use to inject Trulicity once a week on Wednesdays and Lantus once a day. 9/9/20   Alexander Donmichael   metFORMIN (GLUCOPHAGE XR) 500 MG extended release tablet Take 2 tablets by mouth 2 times daily (with meals) 9/9/20   Alexander Kelley   blood glucose monitor strips Test 3-4 times a day & as needed for symptoms of irregular blood glucose. Dispense sufficient amount for indicated testing frequency plus additional to accommodate PRN testing needs.  9/9/20   Moreno Santana   albuterol sulfate  (90 Base) MCG/ACT inhaler Inhale 2 puffs into the lungs every 6 hours as needed for Wheezing    Historical Provider, MD   tiotropium (SPIRIVA RESPIMAT) 2.5 MCG/ACT AERS inhaler Inhale 2 puffs into the lungs daily    Historical Provider, MD   escitalopram (LEXAPRO) 20 MG tablet Take 20 mg by mouth daily     Historical Provider, MD   aspirin 81 MG chewable tablet Take 81 mg by mouth    Historical Provider, MD   metoprolol succinate (TOPROL XL) 50 MG extended release tablet Take 50 mg by mouth daily  1/12/18   Historical Provider, MD   budesonide-formoterol (SYMBICORT) 160-4.5 MCG/ACT AERO 2 puffs twice a day 5/1/18   Historical Provider, MD   Cholecalciferol (VITAMIN D3) 2000 units CAPS Take by mouth daily    Historical Provider, MD   losartan (COZAAR) 50 MG tablet Take 50 mg by mouth daily  10/25/17   Historical Provider, MD   brexpiprazole (REXULTI) 0.25 MG TABS tablet Take 0.25 mg by mouth daily     Historical Provider, MD   rosuvastatin (CRESTOR) 40 MG tablet Take 40 mg by mouth every evening    Historical Provider, MD   montelukast (SINGULAIR) 10 MG tablet Take 10 mg by mouth nightly    Historical Provider, MD   loratadine (CLARITIN) 10 MG tablet Take 10 mg by mouth daily as needed (allergies)     Historical Provider, MD   albuterol (PROVENTIL) (2.5 MG/3ML) 0.083% nebulizer solution Take 2.5 mg by nebulization every 6 hours as needed for Wheezing. Historical Provider, MD       Immunizations:   Most Recent Immunizations   Administered Date(s) Administered    Influenza Virus Vaccine 11/20/2015    Pneumococcal Conjugate 13-valent (Kishan Garcia) 11/20/2015       Home Blood Glucose Results:     Blood glucose trends noted:    2/7  193  2/6  95       116  2/5  88   129   2/4  191  2/3  123   107    160   2/2  184   162  2/1  128  1/31  228   135  1/30     92  1/29  92   105  1/28  121  1/27     107  1/26  112    1/25     61  1/24  133   158  1/23  182       119  1/22  112  1/21  96   202  1/20  162   1/19  248   77    7 day average 140  14 day average 130  30 day average 141  90 day average 149         Overall patient's blood sugars tend to be more controlled other than when patient eats poorly or skips meals. Unrealistic to expect patient to have consistent diet all the time given her situation with lack of transportation/ time in grocery store or limited money to spend on food. Encouraged patient to do the best she can with diet and to not skip meals. Overall average blood sugars stable and not too out of range. Assessment     A1c at goal: No and increasing at 7.9  Blood Pressure at goal: Yes 128/59   Weight at goal: No   Physical activity: not consistently. Physical activity at goal: no  Smoking Cessation: Yes  Cholesterol at target: Yes: LDL 86  Annual eye exam completed: Yes   Comprehensive Foot Exam Completed:  Yes  Annual urine albumin and serum creatinine: Yes    Statin: Yes    Appropriate?: Yes  Changes made:     ACE/ARB: Yes  Appropriate?: Yes  Changes made:     Aspirin: person  Intervention Detail: Adherence Monitorin and Lab(s) Ordered  Intervention Accepted By: Patient/Caregiver: 2  Time Spent (min): 45            Satish Sanches RPH,Pharm. D,, [facility], [facility]  2022  11:36 AM

## 2022-02-08 LAB
ESTIMATED AVERAGE GLUCOSE: 192 MG/DL
HBA1C MFR BLD: 8.3 % (ref 4–6)

## 2022-03-30 ENCOUNTER — TELEPHONE (OUTPATIENT)
Dept: PHARMACY | Age: 67
End: 2022-03-30

## 2022-03-30 RX ORDER — METFORMIN HYDROCHLORIDE 500 MG/1
1000 TABLET, EXTENDED RELEASE ORAL 2 TIMES DAILY WITH MEALS
Qty: 360 TABLET | Refills: 1 | OUTPATIENT
Start: 2022-03-30

## 2022-03-30 RX ORDER — BLOOD SUGAR DIAGNOSTIC
STRIP MISCELLANEOUS
Qty: 600 EACH | Refills: 1 | OUTPATIENT
Start: 2022-03-30 | End: 2022-08-24 | Stop reason: SDUPTHER

## 2022-03-30 NOTE — TELEPHONE ENCOUNTER
Patient called Medication management clinic this afternoon requesting refill for metformin and accu-shelbi  Metformin 500 mg extended release- 2 tablets by mouth 2 times daily. 90 ds with 1 refill  Accu-chek Shelbi test strips Test 3-4 times daily and as needed for symptoms of irregular blood sugar. 90 ds with 1 refill called into [facility] [address].    Gibson Bolden, Pharm D, [facility]  3/30/2022 4:35 PM

## 2022-04-06 ENCOUNTER — HOSPITAL ENCOUNTER (OUTPATIENT)
Dept: PHARMACY | Age: 67
Setting detail: THERAPIES SERIES
Discharge: HOME | End: 2022-04-06
Payer: COMMERCIAL

## 2022-04-06 VITALS — BODY MASS INDEX: 36.11 KG/M2 | DIASTOLIC BLOOD PRESSURE: 62 MMHG | WEIGHT: 217 LBS | SYSTOLIC BLOOD PRESSURE: 144 MMHG

## 2022-04-06 PROCEDURE — 99213 OFFICE O/P EST LOW 20 MIN: CPT

## 2022-04-06 NOTE — PROGRESS NOTES
Diabetic Medication Management   [address]  Phone: [phone]  Fax: [phone]    NAME: Natty [facility] RECORD NUMBER:  [id number]  AGE: 73 y.o. GENDER: female  : 1955  EPISODE DATE:  2022       Ms. Chani Meléndez was referred to [facility] Medication Management Services by Dr. Guy Matthew, Special instructions include: follow up every 3 months with physician in office     Goals per referral:   Fasting blood glucose:   Peak postprandial glucose: < 180  A1C: < 7%    Other goals:  Blood pressure goal: 130/80    Weight loss goal (~10%): Target weight  200 to be reached by date: May 2022 (3-6 months)  Physical Activity goal:    15 minutes 3 times per week to be reached by date: May 2022 (3-6 months)  Smoking Cessation   Quit Date: never smoked  Cholesterol at target:Yes    Date: 2021  Annual eye exam:    Date: 2022 next 22  Comprehensive annual foot exam:   Date: 3/16/21    Annual urine Albumin and serum creatinine:   Date:  10/20/21 SrCr  0.88 mg/dL; microalbumin 53 mg/L    Patient Specific Goals:  1. Lower blood sugars  2. Lower A1C    Subjective   Ms. Chani Meléndez is a 73 y.o. female here for the Diabetes Service for self-management education, medication review including over the counter medications and herbal products, overall wellbeing assessment, transition of care and any needed adjustments with updates and recommendations communicated to the referring physician. Patient's name and  verified. Patient visit in person in office. Patient Findings:    Patient states she fell on  when she was getting off a bus. The sidewalk was uneven and she tripped. Patient states the EMS were called to help her get up and checked out. Patient states for several weeks after that she just was not her self.  Has also been stressed and running around with some paperwork from the [employer] and meetings with [employer]. Patient states she is more short of breath today due to being in a rush with the care a van. The [employer] came early and she was not yet ready so rushed around. She brings her albuterol, Spiriva and Symbicort. Patient took 2 quick puffs of the albuterol when she got into the office. Patient educated on proper administration of the inhalers. Blood pressure is up a little today 144/62. Pt states she is still so flustered with leaving the house quickly. Discussed checking blood pressure at home. Patient had appt with Dr. Jesus Fernandes (endocrinology) February 14. Patient states she thinks POC hemoglobin a1c was 7.4. Per patient, no changes made at that appointment. Next appointment May 11. Patient indicates the nurse practitioner in endocrinology office did a diabetic foot exam at the visit. Patient reports she received her CPAP machine but was having difficulty with the face mask. Was given a different type of mask. She hasn't used it as of yet. She is concerned with cancer with CPAP machine with some of the things she has seen on the tv. Will see Pulmonologist at the [facility] on April 19. Will discuss her concerns with him. Patient has missed several doses of metformin. Reports compliance with Trulicity and basaglar insulin. Does not think she missed any doses. Other medications reviewed. Patient reports compliance with other medications except eye drops. Were causing dry eye and blurry vision and cut them out. She has seen her Ophthalmologist about this and has a follow-up on April 20. Patient brings in blood sugar log for February and beginning of March until her fall. After her fall March 4, she stopped recording blood sugars and foods on her log. Patient did bring glucometer, but does have many days without readings recently. Blood sugar the past several days have been higher this week. Patient states she ran out of food this weekend.  Ordered a pizza, breadsticks, wings, cookies and salad. Has been eating this over several days. This explains the elevated blood sugars. Her son was able to take her to the store yesterday so got some vegetables and fruit. Patient had one episode of hypoglycemia of (74) the week after last visit in February. No recent hypoglycemia. Patient got her covid-19 booster on 12/17 and also received the flu vaccine. Has not been able to walk due to weather. Discussed using her exercise bike and starting with a few minutes and working up as tolerated. []  Missed doses   []  Emergency Room Visit    []  Medication changes  []  Hospitalization   [x]  Diet changes   []  Acute illness   []  Activity changes      Symptoms of hypoglycemia -     []  None   [x]  Shakiness    []  Lightheadedness or dizziness   []  Confusion      []  Sweating   []  Other       Symptoms of hyperglycemia   [x]  None   []  Frequent urination    []  Increased thirst   []  Other    Medication adverse reactions   [x]  None   []  Diarrhea / Nausea / Vomiting / Constipation / flatulence   []  Hypertension   []  Peripheral edema     []  Signs of infection   []  Headache   []  Vision changes   []  Increased cholesterol    []  Weight gain   []  Change in renal function   []  Increased potassium  []  Other  Comments:      If recent hospital admission / ED visit, was this related to Diabetes No:hypotension Discharge date July 2020    Objective     PMHx:    Past Medical History:   Diagnosis Date    Anxiety     Asthma     Common cold     COPD (chronic obstructive pulmonary disease) ([address]     Depression     Diabetes mellitus ([address]     Emphysema     Fatty liver     H/O: pneumonia FEB. 2014    Hiatal hernia     Hyperlipidemia     Hypertension     Mitral valve prolapse     Psychosis (HCC)     SOB (shortness of breath) on exertion     Spinal headache     Stroke ([address] 2013    Mild Lt side    Upper respiratory symptom        Social History:    Social History     Tobacco Use    Smoking status: Passive Smoke Exposure - Never Smoker    Smokeless tobacco: Never Used    Tobacco comment: socially smoked when playing cards   Substance Use Topics    Alcohol use: No     Alcohol/week: 0.0 standard drinks     Comment: socially       Pertinent Labs:    Lab Results   Component Value Date    LABA1C 8.3 (H) 02/07/2022    LABA1C 7.9 (H) 10/20/2021    LABA1C 7.1 07/15/2021     Lab Results   Component Value Date    CHOL 171 10/20/2021    TRIG 133 10/20/2021    HDL 58 10/20/2021    LDLCALC 59 02/12/2020     Lipid panel on 12/16/2020  Cholesterol 135  HDL 50  LDL 55  Triglyceride 150      Lab Results   Component Value Date    CREATININE 0.88 10/20/2021    BUN 20 10/20/2021     10/20/2021    K 5.0 10/20/2021     10/20/2021    CO2 25 10/20/2021     CrCl calculated at 66ml/min      Lab Results   Component Value Date    ALT 28 10/20/2021       Weight:  Wt Readings from Last 3 Encounters:   04/06/22 217 lb (98.4 kg)   02/07/22 217 lb (98.4 kg)   12/15/21 220 lb 9.6 oz (100.1 kg)       Current medications:  Prior to Admission medications    Medication Sig Start Date End Date Taking? Authorizing Provider   metFORMIN (GLUCOPHAGE-XR) 500 MG extended release tablet Take 2 tablets by mouth 2 times daily (with meals) 3/30/22  Yes Marilyn Gleason   blood glucose test strips (ACCU-CHEK DENISE PLUS) strip Test 3-4 times a day & as needed for symptoms of irregular blood glucose.  Dispense sufficient amount for indicated testing frequency plus additional to accommodate PRN testing needs 3/30/22  Yes Marilyn Gleason   famotidine (PEPCID) 20 MG tablet Take 20 mg by mouth 2 times daily   Yes Historical Provider, MD   Magnesium Oxide 500 MG TABS Take 1 tablet by mouth daily   Yes Historical Provider, MD   insulin glargine (LANTUS SOLOSTAR) 100 UNIT/ML injection pen Inject 65 Units into the skin nightly  Patient taking differently: Inject 67 Units into the skin nightly Patient using Basaglar 11/17/21 Yes Carlos Santana   Dulaglutide (TRULICITY) 3 PG/2.0SV SOPN Inject 3 mg into the skin once a week on Wednesdays 12/17/20  Yes Carlos Santana   nitroGLYCERIN (NITROSTAT) 0.4 MG SL tablet Place 0.4 mg under the tongue every 5 minutes as needed for Chest pain up to max of 3 total doses. If no relief after 1 dose, call 911. Yes Historical Provider, MD   albuterol sulfate  (90 Base) MCG/ACT inhaler Inhale 2 puffs into the lungs every 6 hours as needed for Wheezing   Yes Historical Provider, MD   tiotropium (SPIRIVA RESPIMAT) 2.5 MCG/ACT AERS inhaler Inhale 2 puffs into the lungs daily   Yes Historical Provider, MD   escitalopram (LEXAPRO) 20 MG tablet Take 20 mg by mouth daily    Yes Historical Provider, MD   aspirin 81 MG chewable tablet Take 81 mg by mouth   Yes Historical Provider, MD   metoprolol succinate (TOPROL XL) 50 MG extended release tablet Take 50 mg by mouth daily  1/12/18  Yes Historical Provider, MD   budesonide-formoterol (SYMBICORT) 160-4.5 MCG/ACT AERO 2 puffs twice a day 5/1/18  Yes Historical Provider, MD   losartan (COZAAR) 50 MG tablet Take 50 mg by mouth daily  10/25/17  Yes Historical Provider, MD   brexpiprazole (REXULTI) 0.25 MG TABS tablet Take 0.25 mg by mouth daily    Yes Historical Provider, MD   rosuvastatin (CRESTOR) 40 MG tablet Take 40 mg by mouth every evening   Yes Historical Provider, MD   montelukast (SINGULAIR) 10 MG tablet Take 10 mg by mouth nightly   Yes Historical Provider, MD   loratadine (CLARITIN) 10 MG tablet Take 10 mg by mouth daily as needed (allergies)    Yes Historical Provider, MD   albuterol (PROVENTIL) (2.5 MG/3ML) 0.083% nebulizer solution Take 2.5 mg by nebulization every 6 hours as needed for Wheezing.    Yes Historical Provider, MD   dorzolamide (TRUSOPT) 2 % ophthalmic solution Place 1 drop into both eyes 2 times daily  Patient not taking: Reported on 4/6/2022    Historical Provider, MD   brimonidine (ALPHAGAN) 0.2 % ophthalmic solution Place 1 drop into both eyes 2 times daily  Patient not taking: Reported on 4/6/2022 12/10/20   Historical Provider, MD   Lancets MISC Use to check blood sugar 3-4 times daily (before meals and at bedtime) and as needed for symptoms. 10/21/20   Juli Pack   Insulin Pen Needle 32G X 4 MM MISC Use to inject Trulicity once a week on Wednesdays and Lantus once a day. 9/9/20   Juli Pack   Cholecalciferol (VITAMIN D3) 2000 units CAPS Take by mouth daily    Historical Provider, MD       Immunizations:   Most Recent Immunizations   Administered Date(s) Administered    Influenza Virus Vaccine 11/20/2015    Pneumococcal Conjugate 13-valent (Burnadette Sifuentes) 11/20/2015       Home Blood Glucose Results:     Blood glucose trends noted:      FBG  Prior to lunch Prior to dinner  2/8 109     173  2/9 113  116   102  2/10 85  158   95  2/11 94  99   115  2/12 117  74   109  2/13 95     83  2/14 225 (cookies overnight)  109  2/15 103  109  2/16 111  99  2/17 160 (chinese buffet the night before) 99  2/18 138  159   171  2/19 96  122  2/20 132  103      2/21 169     235 (Ravioli, Large sub)  2/22 155  153  2/23   142   175  (Party)  2/24 179     117  2/25 153  98   112  2/26 150     125  2/27 111  78  2/28 129     104  3/1 135  126    123(bedtime)  3/2   139   107  3/3   111   171    3/18 144  3/21 121  112  3/22      145  3/23 136  3/24 85  3/25 122  3/26      116  3/27 115    3/29 190  3/30 158  3/31   173   147  4/1 139  4/2 198     152 ----ordered pizza, breadsticks, wings, cookies and salad due to needing groceries. Ate this over several days. 4/3 no readings  4/4  166  96  4/5 no readings - went to the grocery and has healthier foods now. Overall patient's blood sugars tend to be more controlled other than when patient eats poorly or skips meals.   Unrealistic to expect patient to have consistent diet all the time given her situation with lack of transportation/ time in grocery store daily and document in sugar log. Patient to reece down whenever she eats something she feels is a treat or splurg. Pt to call the clinic with blood sugars <70 or >200    Use exercise bike with goal of 5-10 min a few days a week. Patient will monitor blood pressure at home and call back if consistently above 130/80    Physician Follow-up:  Every 3 months     Medication Management Follow-up:   Diabetes Service   8 weeks. Pt seeing Endocrinlogist a the beginning of May in 4 weeks. Will stagger visits. Electronically signed by Hector Silva [facility] on 2022 at 3:17 PM     For Pharmacy [address] [facility] in place:  Yes  Recommendation Provided To: Patient/Caregiver: 1 via In person  Intervention Detail: Adherence Monitorin  Intervention Accepted By: Patient/Caregiver: 1  Time Spent (min): Orestes Fagan RPH,Pharm. D,, BCPS, CACP  2022  3:17 PM

## 2022-06-01 ENCOUNTER — HOSPITAL ENCOUNTER (OUTPATIENT)
Dept: PHARMACY | Age: 67
Setting detail: THERAPIES SERIES
Discharge: HOME | End: 2022-06-01
Payer: COMMERCIAL

## 2022-06-01 ENCOUNTER — HOSPITAL ENCOUNTER (OUTPATIENT)
Age: 67
Discharge: HOME | End: 2022-06-01
Payer: COMMERCIAL

## 2022-06-01 VITALS — SYSTOLIC BLOOD PRESSURE: 145 MMHG | WEIGHT: 213 LBS | BODY MASS INDEX: 35.45 KG/M2 | DIASTOLIC BLOOD PRESSURE: 73 MMHG

## 2022-06-01 LAB
ABSOLUTE EOS #: 0.32 K/UL (ref 0–0.4)
ABSOLUTE LYMPH #: 2.43 K/UL (ref 1–4.8)
ABSOLUTE MONO #: 0.73 K/UL (ref 0.1–1.3)
ALBUMIN SERPL-MCNC: 4.3 G/DL (ref 3.5–5.2)
ALP BLD-CCNC: 71 U/L (ref 35–104)
ALT SERPL-CCNC: 27 U/L (ref 5–33)
ANION GAP SERPL CALCULATED.3IONS-SCNC: 12 MMOL/L (ref 9–17)
AST SERPL-CCNC: 28 U/L
BACTERIA: ABNORMAL
BASOPHILS # BLD: 1 % (ref 0–2)
BASOPHILS ABSOLUTE: 0.08 K/UL (ref 0–0.2)
BILIRUB SERPL-MCNC: 0.48 MG/DL (ref 0.3–1.2)
BILIRUBIN URINE: NEGATIVE
BUN BLDV-MCNC: 18 MG/DL (ref 8–23)
CALCIUM SERPL-MCNC: 10 MG/DL (ref 8.6–10.4)
CASTS UA: ABNORMAL /LPF
CHLORIDE BLD-SCNC: 102 MMOL/L (ref 98–107)
CHOLESTEROL/HDL RATIO: 3.2
CHOLESTEROL: 159 MG/DL
CO2: 25 MMOL/L (ref 20–31)
COLOR: YELLOW
CREAT SERPL-MCNC: 0.84 MG/DL (ref 0.5–0.9)
EOSINOPHILS RELATIVE PERCENT: 4 % (ref 0–4)
EPITHELIAL CELLS UA: ABNORMAL /HPF
ESTIMATED AVERAGE GLUCOSE: 192 MG/DL
GFR AFRICAN AMERICAN: >60 ML/MIN
GFR NON-AFRICAN AMERICAN: >60 ML/MIN
GFR SERPL CREATININE-BSD FRML MDRD: ABNORMAL ML/MIN/{1.73_M2}
GLUCOSE BLD-MCNC: 107 MG/DL (ref 70–99)
GLUCOSE URINE: NEGATIVE
HBA1C MFR BLD: 8.3 % (ref 4–6)
HCT VFR BLD CALC: 34.8 % (ref 36–46)
HDLC SERPL-MCNC: 49 MG/DL
HEMOGLOBIN: 11.1 G/DL (ref 12–16)
KETONES, URINE: NEGATIVE
LDL CHOLESTEROL: 81 MG/DL (ref 0–130)
LEUKOCYTE ESTERASE, URINE: ABNORMAL
LYMPHOCYTES # BLD: 30 % (ref 24–44)
MCH RBC QN AUTO: 24.6 PG (ref 26–34)
MCHC RBC AUTO-ENTMCNC: 31.8 G/DL (ref 31–37)
MCV RBC AUTO: 77.3 FL (ref 80–100)
MONOCYTES # BLD: 9 % (ref 1–7)
MORPHOLOGY: ABNORMAL
MORPHOLOGY: ABNORMAL
NITRITE, URINE: NEGATIVE
PDW BLD-RTO: 17.4 % (ref 11.5–14.9)
PH UA: 5 (ref 5–8)
PLATELET # BLD: 337 K/UL (ref 150–450)
PMV BLD AUTO: 7.4 FL (ref 6–12)
POTASSIUM SERPL-SCNC: 4.7 MMOL/L (ref 3.7–5.3)
PROTEIN UA: NEGATIVE
RBC # BLD: 4.5 M/UL (ref 4–5.2)
RBC UA: ABNORMAL /HPF
SEG NEUTROPHILS: 56 % (ref 36–66)
SEGMENTED NEUTROPHILS ABSOLUTE COUNT: 4.54 K/UL (ref 1.3–9.1)
SODIUM BLD-SCNC: 139 MMOL/L (ref 135–144)
SPECIFIC GRAVITY UA: 1.02 (ref 1–1.03)
THYROXINE, FREE: 0.94 NG/DL (ref 0.93–1.7)
TOTAL PROTEIN: 7.5 G/DL (ref 6.4–8.3)
TRIGL SERPL-MCNC: 146 MG/DL
TSH SERPL DL<=0.05 MIU/L-ACNC: 0.68 UIU/ML (ref 0.3–5)
TURBIDITY: CLEAR
URINE HGB: NEGATIVE
UROBILINOGEN, URINE: NORMAL
VITAMIN D 25-HYDROXY: 80.9 NG/ML
WBC # BLD: 8.1 K/UL (ref 3.5–11)
WBC UA: ABNORMAL /HPF

## 2022-06-01 PROCEDURE — 80053 COMPREHEN METABOLIC PANEL: CPT

## 2022-06-01 PROCEDURE — 36415 COLL VENOUS BLD VENIPUNCTURE: CPT

## 2022-06-01 PROCEDURE — 85025 COMPLETE CBC W/AUTO DIFF WBC: CPT

## 2022-06-01 PROCEDURE — 99213 OFFICE O/P EST LOW 20 MIN: CPT

## 2022-06-01 PROCEDURE — 82306 VITAMIN D 25 HYDROXY: CPT

## 2022-06-01 PROCEDURE — 81001 URINALYSIS AUTO W/SCOPE: CPT

## 2022-06-01 PROCEDURE — 83036 HEMOGLOBIN GLYCOSYLATED A1C: CPT

## 2022-06-01 PROCEDURE — 84439 ASSAY OF FREE THYROXINE: CPT

## 2022-06-01 PROCEDURE — 84443 ASSAY THYROID STIM HORMONE: CPT

## 2022-06-01 PROCEDURE — 80061 LIPID PANEL: CPT

## 2022-06-01 RX ORDER — PANTOPRAZOLE SODIUM 40 MG/1
40 TABLET, DELAYED RELEASE ORAL DAILY
COMMUNITY

## 2022-06-01 NOTE — PROGRESS NOTES
Diabetic Medication Management   [address] [facility]     [address]  Phone: [phone]  Fax: [phone]    NAME: [facility] RECORD NUMBER:  [id number]  AGE: 73 y.o. GENDER: female  : 1955  EPISODE DATE:  2022       Ms. Ward Wagoner was referred to Professor Justyn Calvert [facility] by Dr. Rip Avila, Special instructions include: follow up every 3 months with physician in office     Goals per referral:   Fasting blood glucose:   Peak postprandial glucose: < 180  A1C: < 7%    Other goals:  Blood pressure goal: 130/80    Weight loss goal (~10%): Target weight  200 to be reached by date: May 2022 (3-6 months)  Physical Activity goal:    15 minutes 3 times per week to be reached by date: May 2022 (3-6 months)  Smoking Cessation   Quit Date: never smoked  Cholesterol at target:Yes    Date: 2021  Annual eye exam:    Date: 2022 next 22  Comprehensive annual foot exam:   Date: 3/16/21    Annual urine Albumin and serum creatinine:   Date:  10/20/21 SrCr  0.88 mg/dL; microalbumin 53 mg/L    Patient Specific Goals:  1. Lower blood sugars  2. Lower A1C    Subjective   Ms. Ward Wagoner is a 73 y.o. female here for the Diabetes Service for self-management education, medication review including over the counter medications and herbal products, overall wellbeing assessment, transition of care and any needed adjustments with updates and recommendations communicated to the referring physician. Patient's name and  verified. Patient visit in person in office. Patient Findings:    Patient brings in bloodwork order from PCP, Cherry Garza. Has hemoglobin a1c listed on lab order and will complete today. Assisted patient is getting registered to have these done. Patient states last several weeks have been crazy as sons have been re-doing her bathroom/kitchen. House is a mess.  States she was not eating as much and not healthy choices at all (pizza, pasta, etc). Patient had 2 bowls of spaghetti last night for dinner and blood sugar this morning 143. Discussed heathy easy to grab choices while the remodel is finishing up. Patient brings in glucometer as she has not recorded any of her blood sugar readings. States it was just too stressful to write her readings and meals down. Usually uses a tv tray to keep log on and is not able to right now. The tv tray has other things piled on it. States the house is getting back to order and just has some few things to finish. Bathroom is usable now. Is hopeful will be in a routine in a week or so. No hypoglycemia. Checking blood sugar mainly once per day. See below. Fasting blood sugars mostly controlled with the exception of the 3 nights she had pizza or spaghetti. Blood sugar throughout the day mostly controlled except for after splurges. Patient has missed several doses of metformin, 1 dose of Trulicity and a couple doses of basaglar insulin. No refills of medications needed. Needs prescription for pen needles to [address]. Other medications reviewed. Patient reports compliance with other medications. Reports that Pepcid was changed to Protonix. Medication list updated. Blood pressure is up a little today 145/73. Pt has been fasting for bloodwork for PCP and has not taken any of her morning medications. Discussed checking blood pressure at home. Patient has next appt with Dr. Arsenio Mae (endocrinology) [place] [address]. Patient indicates the nurse practitioner in endocrinology office did a diabetic foot exam at visit in February. Patient got her covid-19 booster on 12/17 and also received the flu vaccine. Has not been exercising with all the remodeling going on. Exercise bike is covered in stuff. Discussed using her exercise bike and starting with a few minutes and working up as tolerated.      []  Missed doses   []  Emergency Room Visit    []  Medication changes  []  Hospitalization   [x]  Diet changes   []  Acute illness   []  Activity changes      Symptoms of hypoglycemia -     []  None   [x]  Shakiness    []  Lightheadedness or dizziness   []  Confusion      []  Sweating   []  Other       Symptoms of hyperglycemia   [x]  None   []  Frequent urination    []  Increased thirst   []  Other    Medication adverse reactions   [x]  None   []  Diarrhea / Nausea / Vomiting / Constipation / flatulence   []  Hypertension   []  Peripheral edema     []  Signs of infection   []  Headache   []  Vision changes   []  Increased cholesterol    []  Weight gain   []  Change in renal function   []  Increased potassium  []  Other  Comments:      If recent hospital admission / ED visit, was this related to Diabetes No:hypotension Discharge date July 2020    Objective     PMHx:    Past Medical History:   Diagnosis Date    Anxiety     Asthma     Common cold     [facility]     Depression     Diabetes mellitus ([facility]     Emphysema     Fatty liver     H/O: pneumonia FEB. 2014    Hiatal hernia     Hyperlipidemia     Hypertension     Mitral valve prolapse     Psychosis (HCC)     SOB (shortness of breath) on exertion     Spinal headache     Stroke ([facility] 2013    Mild Lt side    Upper respiratory symptom        Social History:    Social History     Tobacco Use    Smoking status: Passive Smoke Exposure - Never Smoker    Smokeless tobacco: Never Used    Tobacco comment: socially smoked when playing cards   Substance Use Topics    Alcohol use: No     Alcohol/week: 0.0 standard drinks     Comment: socially       Pertinent Labs:    Lab Results   Component Value Date    LABA1C 8.3 (H) 02/07/2022    LABA1C 7.9 (H) 10/20/2021    LABA1C 7.1 07/15/2021     Lab Results   Component Value Date    CHOL 171 10/20/2021    TRIG 133 10/20/2021    HDL 58 10/20/2021    LDLCALC 59 02/12/2020     Lab Results   Component Value Date    CREATININE 0.88 10/20/2021    BUN 20 10/20/2021     10/20/2021    K 5.0 10/20/2021     10/20/2021    CO2 25 10/20/2021     CrCl calculated at 66ml/min      Lab Results   Component Value Date    ALT 28 10/20/2021       Weight:  Wt Readings from Last 3 Encounters:   06/01/22 213 lb (96.6 kg)   04/06/22 217 lb (98.4 kg)   02/07/22 217 lb (98.4 kg)       Current medications:  Prior to Admission medications    Medication Sig Start Date End Date Taking?  Authorizing Provider   pantoprazole (PROTONIX) 40 MG tablet Take 40 mg by mouth daily   Yes Historical Provider, MD   metFORMIN (GLUCOPHAGE-XR) 500 MG extended release tablet Take 2 tablets by mouth 2 times daily (with meals) 3/30/22  Yes Rosa Chakraborty   Magnesium Oxide 500 MG TABS Take 1 tablet by mouth daily   Yes Historical Provider, MD   insulin glargine (LANTUS SOLOSTAR) 100 UNIT/ML injection pen Inject 65 Units into the skin nightly  Patient taking differently: Inject 67 Units into the skin nightly Patient using Basaglar 11/17/21  Yes Mandy Santana   dorzolamide (TRUSOPT) 2 % ophthalmic solution Place 1 drop into both eyes 2 times daily    Yes Historical Provider, MD   Dulaglutide (TRULICITY) 3 YO/6.1AU SOPN Inject 3 mg into the skin once a week on Wednesdays 12/17/20  Yes Mandy Snatana   brimonidine [facility]) 0.2 % ophthalmic solution Place 1 drop into both eyes 2 times daily  12/10/20  Yes Historical Provider, MD   albuterol sulfate  (90 Base) MCG/ACT inhaler Inhale 2 puffs into the lungs every 6 hours as needed for Wheezing   Yes Historical Provider, MD   tiotropium (SPIRIVA RESPIMAT) 2.5 MCG/ACT AERS inhaler Inhale 2 puffs into the lungs daily   Yes Historical Provider, MD   escitalopram (LEXAPRO) 20 MG tablet Take 20 mg by mouth daily    Yes Historical Provider, MD   aspirin 81 MG chewable tablet Take 81 mg by mouth   Yes Historical Provider, MD   metoprolol succinate (TOPROL XL) 50 MG extended release tablet Take 50 mg by mouth daily  1/12/18  Yes Historical Provider, MD   budesonide-formoterol (SYMBICORT) 160-4.5 MCG/ACT AERO 2 puffs twice a day 5/1/18  Yes Historical Provider, MD   Cholecalciferol (VITAMIN D3) 2000 units CAPS Take by mouth daily   Yes Historical Provider, MD   losartan (COZAAR) 50 MG tablet Take 50 mg by mouth daily  10/25/17  Yes Historical Provider, MD   brexpiprazole (REXULTI) 0.25 MG TABS tablet Take 0.25 mg by mouth daily    Yes Historical Provider, MD   rosuvastatin (CRESTOR) 40 MG tablet Take 40 mg by mouth every evening   Yes Historical Provider, MD   montelukast (SINGULAIR) 10 MG tablet Take 10 mg by mouth nightly   Yes Historical Provider, MD   loratadine (CLARITIN) 10 MG tablet Take 10 mg by mouth daily as needed (allergies)    Yes Historical Provider, MD   albuterol (PROVENTIL) (2.5 MG/3ML) 0.083% nebulizer solution Take 2.5 mg by nebulization every 6 hours as needed for Wheezing. Yes Historical Provider, MD   blood glucose test strips (ACCU-CHEK DENISE PLUS) strip Test 3-4 times a day & as needed for symptoms of irregular blood glucose. Dispense sufficient amount for indicated testing frequency plus additional to accommodate PRN testing needs 3/30/22   Sterling Copas   famotidine (PEPCID) 20 MG tablet Take 20 mg by mouth 2 times daily  6/1/22  Historical Provider, MD   nitroGLYCERIN (NITROSTAT) 0.4 MG SL tablet Place 0.4 mg under the tongue every 5 minutes as needed for Chest pain up to max of 3 total doses. If no relief after 1 dose, call 911. Patient not taking: Reported on 6/1/2022    Historical Provider, MD   Lancets MISC Use to check blood sugar 3-4 times daily (before meals and at bedtime) and as needed for symptoms. 10/21/20   Sterling Colvin   Insulin Pen Needle 32G X 4 MM MISC Use to inject Trulicity once a week on Wednesdays and Lantus once a day.  9/9/20   Ruffus Copas       Immunizations:   Most Recent Immunizations   Administered Date(s) Administered    Influenza Virus Vaccine 11/20/2015    Pneumococcal Conjugate 13-valent ([id number]) 11/20/2015       Home Blood Glucose Results:    FBG  Prior to lunch Prior to dinner  Bed  6/1 143*  5/31 112  111   120    5/30   114  5/29 131     167  5/28 113         259*  5/27 103/218  5/26 146  134         5/25 94  5/24 86     139  5/23 97  119  5/22          309*  5/21 160  5/17      182    *Shantal and Raúl    Assessment     A1c at goal: No and increasing at 8.3  Blood Pressure at goal: Not this visit, but previously 128/59. Pt to monitor at home and will recheck next visit. Weight at goal: No   Physical activity: not consistently. Physical activity at goal: no  Smoking Cessation: Yes  Cholesterol at target: LDL 86  Annual eye exam completed: Yes   Comprehensive Foot Exam Completed: Yes  Annual urine albumin and serum creatinine: Yes    Statin: Yes    Appropriate?: Yes  Changes made:     ACE/ARB: Yes  Appropriate?: Yes  Changes made:     Aspirin: Yes  Appropriate?: Yes  Changes made:     Eating patterns:    []  My plate    []  Mediterranean diet   []  Low sodium   []  DASH diet   []  Portion control   []  Reduced calorie    []  Fast food / Restaurant  []  High glycemic index foods   []  Sugary beverages   [x]  Other     Comment: see above    Current Medications Affecting Diabetes:  Dulaglutide 3mg subQ once weekly on Wednesdays  Insulin glargine 67 units subQ each evening  Metformin XR 1000mg in the morning and 1000 mg XR in the evening       Compliant: Yes     Barriers to medication therapy: No     Medications attempted in the past:  Metformin about 3 years ago with some diarrhea. Unsure if regular or extended release. Tolerating ER well. SGLT2 [employer]) frequent yeast infections. Recent exacerbations / new problems:       Last office dictation reviewed: yes        type 2 DM under worsening control as evidenced by A1C of 7.9 on 10/20/21. Will wait for result of today's blood draw.     Plan Continue Insulin glargine (Basaglar) at 67 Units subQ each evening. Continue Dulaglutide (Trulicity) 3mg subQ once weekly on . Continue Metformin XR 1000mg in the morning and 1000 mg XR in the evening    Check blood sugars at least three times daily and document in sugar log. Patient to reece down whenever she eats something she feels is a treat or splurg. Pt to call the clinic with blood sugars <70 or >200    Use exercise bike with goal of 5-10 min a few days a week. Patient will monitor blood pressure at home and call back if consistently above 130/80. Asked patient to check after she has taken her blood pressure medication later today    Patient brings in bloodwork order from PCP, Ayush Gray. Has hemoglobin a1c listed on lab order and will complete today. Prescription for insulin pen needles called in to [address]. Physician Follow-up:  Every 3 months     Medication Management Follow-up:   Diabetes Service   4 weeks. Electronically signed by Satish Dozier [facility] on 2022 at 11:21 AM     [address] in place:  Yes  Recommendation Provided To: Patient/Caregiver: 3 via In person  Intervention Detail: Adherence Monitorin, Lab(s) Ordered and Refill(s) Provided  Intervention Accepted By: Patient/Caregiver: 3  Time Spent (min): Orestes Fagan RPH,Pharm. D,, BCPS, CACP  2022  11:21 AM

## 2022-06-29 ENCOUNTER — HOSPITAL ENCOUNTER (OUTPATIENT)
Dept: PHARMACY | Age: 67
Setting detail: THERAPIES SERIES
Discharge: HOME | End: 2022-06-29
Payer: COMMERCIAL

## 2022-06-29 VITALS — DIASTOLIC BLOOD PRESSURE: 67 MMHG | SYSTOLIC BLOOD PRESSURE: 149 MMHG | BODY MASS INDEX: 36.11 KG/M2 | WEIGHT: 217 LBS

## 2022-06-29 PROCEDURE — 99213 OFFICE O/P EST LOW 20 MIN: CPT

## 2022-06-29 NOTE — PROGRESS NOTES
NAME: [address] [facility] RECORD NUMBER:  [id number]  AGE: 73 y.o. GENDER: female  : 1955  EPISODE DATE:  2022       Ms. Areli Villanueva was referred to [facility] by Dr. LISA MARIANO AT [facility], Special instructions include: follow up every 3 months with physician in office     Goals per referral:   Fasting blood glucose:   Peak postprandial glucose: < 180  A1C: < 7%    Other goals:  Blood pressure goal: 130/80    Weight loss goal (~10%): Target weight  200 to be reached by date: May 2022 (3-6 months)  Physical Activity goal:    15 minutes 3 times per week to be reached by date: May 2022 (3-6 months)  Smoking Cessation   Quit Date: never smoked  Cholesterol at target:Yes    Date: 2021  Annual eye exam:    Date:  22  Comprehensive annual foot exam:   Date: 6/3/22   Annual urine Albumin and serum creatinine:   Date:  10/20/21 SrCr  0.88 mg/dL; microalbumin 53 mg/L    Patient Specific Goals:  1. Lower blood sugars  2. Lower A1C    Subjective   Ms. Areli Villanueva is a 73 y.o. female here for the Diabetes Service for self-management education, medication review including over the counter medications and herbal products, overall wellbeing assessment, transition of care and any needed adjustments with updates and recommendations communicated to the referring physician. Patient's name and  verified. Patient visit in person in office. Patient Findings:    Patient had appt with Dr. LISA MARIANO AT [facility] (endocrinology) Jovana 3. Patient indicates the nurse practitioner in endocrinology office did a diabetic foot exam at that visit. Patient reports no changes to the medications at that appointment. Patient had bloodwork done prior to her appointment. Hemoglobin a1c 8.3, unchanged from February. Total cholesterol and LDL slightly improved. Kidney function unchanged from October, no changes in Metformin needed.      Patient brings in glucometer and blood sugar log for the first week in June.  States she got frustrated and started to mess up the book so stopped recording. Fasting blood sugars for the first week in June running a little high. Upon reviewing blood sugar in glucometer from past several weeks, also running high. Patient does have several days with no readings. Stressed importance of checking blood sugar daily. See readings below. In general running a little high which is most likely diet and stress related the past month per patient. No hypoglycemia reported. Patient states she bought a lot of fruit last week- watermelon, strawberry, grapes, and bananas for her family. They did not get eaten by the family so she ate it so it wouldn't go bad. Raised her fasting blood sugars. Noticed that watermelon raised it the most, so will avoid this. Discussed that fruit is a healthy, but does have a lot of natural sugars so eating in small moderate portions. Bathroom renovation is complete except for some minor painting, etc. This is causing her some additional stress trying to complete everything and the financial burden. Her kitchen is back to order and she is able to cook her meals again. Admits she picks food that she can easily make. Yesterday: Lunch- polish sausage, scallop potatoes and peas. Dinner- stuffing, potatoes, and corn. No meat with it. Discussed the large amount of starch and carbs she had yesterday. Fasting blood sugar this morning was 143 which is understandable based on diet. Patient reports a blood sugar of 421 last Friday 6/24. States she was dealing with paperwork for Medicaid and very stressed. Ate a large honey bun and checked blood sugar one hour later 421. Fasting blood sugar the next day 157. Patient reports she is going to avoid the Honey buns. Patient states she just cannot tolerate the metformin. Feels like vomiting or has vomited after morning and evening doses. Does eat before she takes the metformin. Will decrease the dose and see if this helps. No missed doses of Trulicity or basaglar insulin. However, patient admits to combining several old basaglar pens to make a dose of 67. States they had been sitting around awhile. Discussed using up the pens and not keeping them around for awhile as they will not be as potent/. Patient verbalized understanding. No refills of medications needed. Other medications reviewed. Patient reports compliance with other medications. .     Blood pressure is up a little today 149/67. Pt has not taken any of her morning medications including blood pressure. Asked patient to try to take her medications prior to her visit next time so we can make sure they are working appropriately and do not need adjusted. Patient got her covid-19 booster on  and also received the flu vaccine. Has not been exercising with all the remodeling going on and stress at home. Exercise bike is covered in stuff. Discussed using her exercise bike and starting with a few minutes and working up as tolerated.      []  Missed doses   []  Emergency Room Visit    []  Medication changes  []  Hospitalization   [x]  Diet changes   []  Acute illness   []  Activity changes      Symptoms of hypoglycemia -     []  None   [x]  Shakiness    []  Lightheadedness or dizziness   []  Confusion      []  Sweating   []  Other       Symptoms of hyperglycemia   [x]  None   []  Frequent urination    []  Increased thirst   []  Other    Medication adverse reactions   [x]  None   []  Diarrhea / Nausea / Vomiting / Constipation / flatulence   []  Hypertension   []  Peripheral edema     []  Signs of infection   []  Headache   []  Vision changes   []  Increased cholesterol    []  Weight gain   []  Change in renal function   []  Increased potassium  []  Other  Comments:      If recent hospital admission / ED visit, was this related to Diabetes No:hypotension Discharge date 2020    Objective     PMHx:    Past Medical History:   Diagnosis Date    Anxiety     Asthma     Common cold     COPD (chronic obstructive pulmonary disease) (HCC)     Depression     Diabetes mellitus (HCC)     Emphysema     Fatty liver     H/O: pneumonia FEB. 2014    Hiatal hernia     Hyperlipidemia     Hypertension     Mitral valve prolapse     Psychosis (HCC)     SOB (shortness of breath) on exertion     Spinal headache     Stroke ([facility] [address] 2013    Mild Lt side    Upper respiratory symptom        Social History:    Social History     Tobacco Use    Smoking status: Passive Smoke Exposure - Never Smoker    Smokeless tobacco: Never Used    Tobacco comment: socially smoked when playing cards   Substance Use Topics    Alcohol use: No     Alcohol/week: 0.0 standard drinks     Comment: socially       Pertinent Labs:    Lab Results   Component Value Date    LABA1C 8.3 (H) 06/01/2022    LABA1C 8.3 (H) 02/07/2022    LABA1C 7.9 (H) 10/20/2021     Lab Results   Component Value Date    CHOL 159 06/01/2022    TRIG 146 06/01/2022    HDL 49 06/01/2022    LDLCALC 59 02/12/2020     Lab Results   Component Value Date    CREATININE 0.84 06/01/2022    BUN 18 06/01/2022     06/01/2022    K 4.7 06/01/2022     06/01/2022    CO2 25 06/01/2022     CrCl calculated at 66ml/min      Lab Results   Component Value Date    ALT 27 06/01/2022       Weight:  Wt Readings from Last 3 Encounters:   06/29/22 217 lb (98.4 kg)   06/01/22 213 lb (96.6 kg)   04/06/22 217 lb (98.4 kg)       Current medications:  Prior to Admission medications    Medication Sig Start Date End Date Taking?  Authorizing Provider   pantoprazole (PROTONIX) 40 MG tablet Take 40 mg by mouth daily   Yes Historical Provider, MD   metFORMIN (GLUCOPHAGE-XR) 500 MG extended release tablet Take 2 tablets by mouth 2 times daily (with meals)  Patient taking differently: Take 500 mg by mouth 2 times daily (with meals)  3/30/22  Yes Mary Rodriguez   Magnesium Oxide 500 MG TABS Take 1 tablet by mouth daily   Yes Historical Provider, MD insulin glargine (LANTUS SOLOSTAR) 100 UNIT/ML injection pen Inject 65 Units into the skin nightly  Patient taking differently: Inject 69 Units into the skin nightly Patient using Basaglar 11/17/21  Yes Juan Miles (TRULICITY) 3 AM/8.0RC SOPN Inject 3 mg into the skin once a week on Wednesdays 12/17/20  Yes Santhosh Angus Santana   brimonidine [facility]) 0.2 % ophthalmic solution Place 1 drop into both eyes 2 times daily  12/10/20  Yes Historical Provider, MD   albuterol sulfate  (90 Base) MCG/ACT inhaler Inhale 2 puffs into the lungs every 6 hours as needed for Wheezing   Yes Historical Provider, MD   tiotropium (SPIRIVA RESPIMAT) 2.5 MCG/ACT AERS inhaler Inhale 2 puffs into the lungs daily   Yes Historical Provider, MD   escitalopram (LEXAPRO) 20 MG tablet Take 20 mg by mouth daily    Yes Historical Provider, MD   aspirin 81 MG chewable tablet Take 81 mg by mouth   Yes Historical Provider, MD   metoprolol succinate (TOPROL XL) 50 MG extended release tablet Take 50 mg by mouth daily  1/12/18  Yes Historical Provider, MD   budesonide-formoterol (SYMBICORT) 160-4.5 MCG/ACT AERO 2 puffs twice a day 5/1/18  Yes Historical Provider, MD   Cholecalciferol (VITAMIN D3) 2000 units CAPS Take by mouth daily   Yes Historical Provider, MD   losartan (COZAAR) 50 MG tablet Take 50 mg by mouth daily  10/25/17  Yes Historical Provider, MD   brexpiprazole (REXULTI) 0.25 MG TABS tablet Take 0.25 mg by mouth daily    Yes Historical Provider, MD   rosuvastatin (CRESTOR) 40 MG tablet Take 40 mg by mouth every evening   Yes Historical Provider, MD   montelukast (SINGULAIR) 10 MG tablet Take 10 mg by mouth nightly   Yes Historical Provider, MD   albuterol (PROVENTIL) (2.5 MG/3ML) 0.083% nebulizer solution Take 2.5 mg by nebulization every 6 hours as needed for Wheezing.    Yes Historical Provider, MD   Insulin Pen Needle 32G X 4 MM MISC 1 each by Does not apply route daily 6/1/22   Jen Castro Michelle Weston   blood glucose test strips (ACCU-CHEK DENISE PLUS) strip Test 3-4 times a day & as needed for symptoms of irregular blood glucose. Dispense sufficient amount for indicated testing frequency plus additional to accommodate PRN testing needs 3/30/22   Adal Bazan   dorzolamide (TRUSOPT) 2 % ophthalmic solution Place 1 drop into both eyes 2 times daily     Historical Provider, MD   nitroGLYCERIN (NITROSTAT) 0.4 MG SL tablet Place 0.4 mg under the tongue every 5 minutes as needed for Chest pain up to max of 3 total doses. If no relief after 1 dose, call 911. Patient not taking: Reported on 6/1/2022    Historical Provider, MD   Lancets MISC Use to check blood sugar 3-4 times daily (before meals and at bedtime) and as needed for symptoms. 10/21/20   Radha Santana   loratadine (CLARITIN) 10 MG tablet Take 10 mg by mouth daily as needed (allergies)   Patient not taking: Reported on 6/29/2022    Historical Provider, MD       Immunizations:   Most Recent Immunizations   Administered Date(s) Administered    Influenza Virus Vaccine 11/20/2015    Pneumococcal Conjugate 13-valent (Glennda Garland) 11/20/2015       Home Blood Glucose Results:    FBG  Prior to lunch Prior to dinner  Bed  6/1 143  102   137  6/2 79  118   184  6/3 131  125  6/4 146  225  6/5 131  183  6/6 196  179  6/7 156  6/9   225  6/14 113  6/15 99  6/17 203  131  6/20      126  6/22   147  6/23      177  6/24      421- Honey bun 1 hour prior to blood sugar  6/25 157  6/26 127  138   159  6/28      101  6/29 143    Assessment     A1c at goal: No 8.3  Blood Pressure at goal: Not this visit, but previously 128/59. Pt to take morning meds prior to next visit. Weight at goal: No   Physical activity: not consistently. Physical activity at goal: no  Smoking Cessation: Yes  Cholesterol at target: LDL 81  6/1/22  Annual eye exam completed: Yes   Comprehensive Foot Exam Completed:  Yes  Annual urine albumin and serum creatinine: Yes    Statin: Yes    Appropriate?: Yes  Changes made:     ACE/ARB: Yes  Appropriate?: Yes  Changes made:     Aspirin: Yes  Appropriate?: Yes  Changes made:     Eating patterns:    []  My plate    []  Mediterranean diet   []  Low sodium   []  DASH diet   []  Portion control   []  Reduced calorie    []  Fast food / Restaurant  []  High glycemic index foods   []  Sugary beverages   [x]  Other     Comment: see above    Current Medications Affecting Diabetes:  Dulaglutide 3mg subQ once weekly on Wednesdays  Insulin glargine 67 units subQ each evening  Metformin XR 1000mg in the morning and 1000 mg XR in the evening     Compliant: Yes     Barriers to medication therapy: No     Medications attempted in the past:  Metformin about 3 years ago with some diarrhea. Unsure if regular or extended release. SGLT2 Junious Lavender) frequent yeast infections. Recent exacerbations / new problems:       Last office dictation reviewed: yes        type 2 DM under worsening control as evidenced by A1C of 8.3 6/1/22. Plan     1. Continue Dulaglutide (Trulicity) 3mg subQ once weekly on Wednesdays. 2. Due to nausea/vomiting, will decrease Metformin XR 500mg in the morning and 500 mg XR in the evening    3. Due to increasing blood sugars and decrease in metformin, increase Insulin glargine (Basaglar) to 69 Units subQ each evening. 4. Check blood sugars at least three times daily and document in sugar log. Patient to reece down whenever she eats something she feels is a treat or splurg. Pt to call the clinic with blood sugars <70 or >200    5. Use exercise bike with goal of 5-10 min a few days a week. 6. Asked patient to take her medications prior to morning appointment to determine if blood pressure controlled. Physician Follow-up:  Every 3 months     Medication Management Follow-up:   Diabetes Service   3 weeks.       Electronically signed by Mirta Carlisle [facility] on 6/29/2022 at 1:13 PM     For Pharmacy Admin Tracking Only    CPA in place:  Yes  Recommendation Provided To: Patient/Caregiver: 3 via In person  Intervention Detail: Adherence Monitorin, Lab(s) Ordered and Refill(s) Provided  Intervention Accepted By: Patient/Caregiver: 3  Time Spent (min): Orestes Fagan RPH,Pharm. D,, BCPS, CACP  2022  1:13 PM

## 2022-07-20 ENCOUNTER — TELEPHONE (OUTPATIENT)
Dept: PHARMACY | Age: 67
End: 2022-07-20

## 2022-07-20 NOTE — TELEPHONE ENCOUNTER
Patient called to cancel appt today for diabetes disease state management due to feeling nauseous. Appt rescheduled for 7/28/22. Satish Sanches RPH,Pharm. D,, BCPS, [facility]  7/20/2022  7:12 AM

## 2022-07-28 ENCOUNTER — HOSPITAL ENCOUNTER (OUTPATIENT)
Dept: PHARMACY | Age: 67
Setting detail: THERAPIES SERIES
Discharge: HOME | End: 2022-07-28
Payer: COMMERCIAL

## 2022-07-28 VITALS — SYSTOLIC BLOOD PRESSURE: 144 MMHG | BODY MASS INDEX: 36.11 KG/M2 | DIASTOLIC BLOOD PRESSURE: 52 MMHG | WEIGHT: 217 LBS

## 2022-07-28 PROCEDURE — 99213 OFFICE O/P EST LOW 20 MIN: CPT

## 2022-07-28 NOTE — PROGRESS NOTES
NAME: [facility] RECORD NUMBER:  [id number]  AGE: 73 y.o. GENDER: female  : 1955  EPISODE DATE:  2022       Ms. Rock Barton was referred to [facility] Medication Management Services by Dr. Benjamin Hui, Special instructions include: follow up every 3 months with physician in office     Goals per referral:   Fasting blood glucose:   Peak postprandial glucose: < 180  A1C: < 7%    Other goals:  Blood pressure goal: 130/80    Weight loss goal (~10%): Target weight  200 to be reached by date: 2022 (3-6 months)  Physical Activity goal:    15 minutes 3 times per week to be reached by date: 2022 (3-6 months)  Smoking Cessation   Quit Date: never smoked  Cholesterol at target:Yes    Date: 2021  Annual eye exam:    Date:  22  Comprehensive annual foot exam:   Date: 6/3/22   Annual urine Albumin and serum creatinine:   Date:  10/20/21 SrCr  0.88 mg/dL; microalbumin 53 mg/L    Patient Specific Goals:  1. Lower blood sugars  2. Lower A1C    Subjective   Ms. Rock Barton is a 73 y.o. female here for the Diabetes Service for self-management education, medication review including over the counter medications and herbal products, overall wellbeing assessment, transition of care and any needed adjustments with updates and recommendations communicated to the referring physician. Patient's name and  verified. Patient visit in person in office. Patient Findings:    Patient reports taking just 500mg metformin XR twice a day helped to cause less nausea/GI problems but patient noticed blood sugars were higher. Patient reports there have been a few times when she eats a good meal and blood sugar is high she has taken 1000mg with lunch. Patient takes metformin with lunch and dinner as larger meals. Patient has appt with Dr. Greco/endocrinology next on . Patient has been checking blood sugar 1-3 times a day. Most usually twice a day.   Patient checks FBS but often may be around lunch time as she sleeps thru breakfast.     Patient reports compliance with Trulicity, Metformin (see above) and insulin other than one time she thinks she may have forgot her insulin as fell asleep but is not 100%sure. Patient has enough of all diabetic medications and diabetic testing supplies. Patient has had no hypoglycemia since last visit. Patient reports a lot of stress in last few months with grandson having accident (he is fine now) and having to provide paperwork to continue medicaid. Patient reports stress is somewhat better but still dealing with lots of issues. Patient reports cooking more than last appt but usually very easy meals. Patient typically makes very easy meals that she can eat over several days to try to make her food stretch as far as possible. Breakfast today - baked beans (small serving), slice of cheese with two pieces of white bread and a few potato chips. Normally eats wheat bread with eggs. Dinner yesterday - pop tarts (2) - typically sandwich with veges  Lunch - usually tacos or chili or spaghetti  States all of her fruit is gone or went bad. Does not snack much but make pop popsicles she makes. Drinks sugarfree / diet pop or iced tea. Rarely she may eat some cheese crackers or peanut butter with crackers. Discussed diet with patient but it is difficult due to patient's limited monies for food and fact that she is usually looking for something easy and fast.     BP is 144/52 but patient indicates she did not take her BP meds or any meds this am as did not have enough food this am to take her medications. Discussed benefit of taking her medications prior to our appt to see what effect this will have on her BP. Patient indicates she can try to do that next time and will just wait to take metformin till lunch. Patient has been trying to walk but reports difficult due to heat so not really doing it very much.   Only walking to store, [employer]. Patient thinks she walks on average about 3 times a week but can be up to an hour as she takes her time as she is afraid to fall. Patient states her kitchen is too hot to use exercise bike. Living room/foyer is cooled as this is where the air conditioner is . No room for exercise bike due to too many boxes. Patient getting food boxes and med boxes. Patient gets freezer meals and puts meals in freezer but keeps the boxes. []  Missed doses   []  Emergency Room Visit    []  Medication changes  []  Hospitalization   [x]  Diet changes   []  Acute illness   []  Activity changes      Symptoms of hypoglycemia -     [x]  None   []  Shakiness    []  Lightheadedness or dizziness   []  Confusion      []  Sweating   []  Other       Symptoms of hyperglycemia   [x]  None   []  Frequent urination    []  Increased thirst   []  Other    Medication adverse reactions   [x]  None   []  Diarrhea / Nausea / Vomiting / Constipation / flatulence   []  Hypertension   []  Peripheral edema     []  Signs of infection   []  Headache   []  Vision changes   []  Increased cholesterol    []  Weight gain   []  Change in renal function   []  Increased potassium  []  Other  Comments:      If recent hospital admission / ED visit, was this related to Diabetes No:hypotension Discharge date July 2020    Objective     PMHx:    Past Medical History:   Diagnosis Date    Anxiety     Asthma     Common cold     COPD (chronic obstructive pulmonary disease) ([facility] [address]     Depression     Diabetes mellitus ([facility] [address]     Emphysema     Fatty liver     H/O: pneumonia FEB. 2014    Hiatal hernia     Hyperlipidemia     Hypertension     Mitral valve prolapse     Psychosis ([address]     SOB (shortness of breath) on exertion     Spinal headache     Stroke ([facility] [address] 2013    Mild Lt side    Upper respiratory symptom        Social History:    Social History     Tobacco Use    Smoking status: Passive Smoke Exposure - Never Smoker    Smokeless tobacco: Never Tobacco comments:     socially smoked when playing cards   Substance Use Topics    Alcohol use: No     Alcohol/week: 0.0 standard drinks     Comment: socially       Pertinent Labs:    Lab Results   Component Value Date    LABA1C 8.3 (H) 06/01/2022    LABA1C 8.3 (H) 02/07/2022    LABA1C 7.9 (H) 10/20/2021     Lab Results   Component Value Date    CHOL 159 06/01/2022    TRIG 146 06/01/2022    HDL 49 06/01/2022    LDLCALC 59 02/12/2020     Lab Results   Component Value Date    CREATININE 0.84 06/01/2022    BUN 18 06/01/2022     06/01/2022    K 4.7 06/01/2022     06/01/2022    CO2 25 06/01/2022     CrCl calculated at 66ml/min      Lab Results   Component Value Date/Time    ALT 27 06/01/2022 11:24 AM       Weight:  Wt Readings from Last 3 Encounters:   07/28/22 217 lb (98.4 kg)   06/29/22 217 lb (98.4 kg)   06/01/22 213 lb (96.6 kg)       Current medications:  Prior to Admission medications    Medication Sig Start Date End Date Taking? Authorizing Provider   escitalopram (LEXAPRO) 20 MG tablet Take 20 mg by mouth nightly   Yes Historical Provider, MD   pantoprazole (PROTONIX) 40 MG tablet Take 40 mg by mouth daily    Historical Provider, MD   Insulin Pen Needle 32G X 4 MM MISC 1 each by Does not apply route daily 6/1/22   Bronson Herndon   metFORMIN (GLUCOPHAGE-XR) 500 MG extended release tablet Take 2 tablets by mouth 2 times daily (with meals)  Patient taking differently: Take 500 mg by mouth 2 times daily (with meals)  3/30/22   Bronson Herndon   blood glucose test strips (ACCU-CHEK DENISE PLUS) strip Test 3-4 times a day & as needed for symptoms of irregular blood glucose.  Dispense sufficient amount for indicated testing frequency plus additional to accommodate PRN testing needs 3/30/22   Bronson Herndon   Magnesium Oxide 500 MG TABS Take 1 tablet by mouth daily    Historical Provider, MD   insulin glargine (LANTUS SOLOSTAR) 100 UNIT/ML injection pen Inject 65 Units into the skin nightly  Patient taking differently: Inject 69 Units into the skin nightly Patient using Basaglar 11/17/21   Cheryl Santana   dorzolamide (TRUSOPT) 2 % ophthalmic solution Place 1 drop into both eyes 2 times daily     Historical Provider, MD   Dulaglutide (TRULICITY) 3 AN/2.8ZV SOPN Inject 3 mg into the skin once a week on Wednesdays 12/17/20   Claretha Maillard   brimonidine [facility]) 0.2 % ophthalmic solution Place 1 drop into both eyes 2 times daily  12/10/20   Historical Provider, MD   nitroGLYCERIN (NITROSTAT) 0.4 MG SL tablet Place 0.4 mg under the tongue every 5 minutes as needed for Chest pain up to max of 3 total doses. If no relief after 1 dose, call 911. Patient not taking: Reported on 6/1/2022    Historical Provider, MD   Lancets MISC Use to check blood sugar 3-4 times daily (before meals and at bedtime) and as needed for symptoms.  10/21/20   Cheryl Santana   albuterol sulfate  (90 Base) MCG/ACT inhaler Inhale 2 puffs into the lungs every 6 hours as needed for Wheezing    Historical Provider, MD   tiotropium (SPIRIVA RESPIMAT) 2.5 MCG/ACT AERS inhaler Inhale 2 puffs into the lungs daily    Historical Provider, MD   aspirin 81 MG chewable tablet Take 81 mg by mouth    Historical Provider, MD   metoprolol succinate (TOPROL XL) 50 MG extended release tablet Take 50 mg by mouth daily  1/12/18   Historical Provider, MD   budesonide-formoterol (SYMBICORT) 160-4.5 MCG/ACT AERO 2 puffs twice a day 5/1/18   Historical Provider, MD   Cholecalciferol (VITAMIN D3) 2000 units CAPS Take by mouth daily    Historical Provider, MD   losartan (COZAAR) 50 MG tablet Take 50 mg by mouth daily  10/25/17   Historical Provider, MD   brexpiprazole (REXULTI) 0.25 MG TABS tablet Take 0.25 mg by mouth daily     Historical Provider, MD   rosuvastatin (CRESTOR) 40 MG tablet Take 40 mg by mouth every evening    Historical Provider, MD   montelukast (SINGULAIR) 10 MG tablet Take 10 mg / Pedro Harmon  []  High glycemic index foods   []  Sugary beverages   [x]  Other     Comment: see above    Current Medications Affecting Diabetes:  Dulaglutide 3mg subQ once weekly on Wednesdays  Insulin glargine 69 units subQ each evening  Metformin XR 500mg in the morning and 500 mg XR in the evening     Compliant: Yes     Barriers to medication therapy: No     Medications attempted in the past:  Metformin about 3 years ago with some diarrhea. Unsure if regular or extended release. SGLT2 Helen Veloz) frequent yeast infections. Recent exacerbations / new problems:       Last office dictation reviewed: yes        type 2 DM under worsening control as evidenced by A1C of 8.3 6/1/22. Patient would benefit from intensified therapy for better BS control. Patient wishes to stay on reduced dose of metformin and while she is on maximum dose of Trulicity (3mg currently and can go to 4.5mg) a dose increase would not result in significantly more A1c/BS lowering. Patient did not tolerate SGLT2 inhibitors, would not benefit from DPP4 inhibitors (already on GLP1) and is not a candidate for pioglitazone due to heart failure. Will continue to titrate insulin dose. Plan     1. Continue Dulaglutide (Trulicity) 3mg subQ once weekly on Wednesdays. 2. Continue Metformin XR 500mg in the morning and 500 mg XR in the evening    3. Due to above goal blood sugars, increase Insulin glargine (Basaglar) to 71Units subQ each evening. 4. Check blood sugars at least three times daily and document in sugar log. Patient to reece down whenever she eats something she feels is a treat or splurg. Pt to call the clinic with blood sugars <70 or >200    5. Use exercise bike with goal of 5-10 min a few days a week. 6. Asked patient to take her medications prior to morning appointment to determine if blood pressure controlled.        Physician Follow-up:  Every 3 months     Medication Management Follow-up:   Diabetes Service 4weeks. Electronically signed by Yu Hernandez RPH on 2022 at [address] [facility] in place:  Yes  Recommendation Provided To: Patient/Caregiver: 3 via In person  Intervention Detail: Adherence Monitorin, Lab(s) Ordered and Refill(s) Provided  Intervention Accepted By: Patient/Caregiver: 3  Time Spent (min): 45      Satish Sanches RPH,Pharm. D,, BCPS, CACP  2022  4:12 PM

## 2022-07-28 NOTE — DISCHARGE INSTRUCTIONS
Take your blood pressure medications in the morning before you come to see us next time. INCREASE insulin glargine to 71 units each evening. Continue:  Dulaglutide (Trulicity) 3mg injection once a week on Wednesdays  Metformin XR 500mg (1 tablet) with lunch and 500mg (1 tablet) with dinner.

## 2022-08-24 ENCOUNTER — TELEPHONE (OUTPATIENT)
Dept: PHARMACY | Age: 67
End: 2022-08-24

## 2022-08-24 ENCOUNTER — HOSPITAL ENCOUNTER (OUTPATIENT)
Dept: PHARMACY | Age: 67
Setting detail: THERAPIES SERIES
Discharge: HOME | End: 2022-08-24
Payer: COMMERCIAL

## 2022-08-24 VITALS — WEIGHT: 211.4 LBS | BODY MASS INDEX: 35.18 KG/M2

## 2022-08-24 PROCEDURE — 99213 OFFICE O/P EST LOW 20 MIN: CPT

## 2022-08-24 RX ORDER — INSULIN GLARGINE 100 [IU]/ML
73 INJECTION, SOLUTION SUBCUTANEOUS NIGHTLY
Qty: 8 PEN | Refills: 4 | OUTPATIENT
Start: 2022-08-24 | End: 2022-10-05 | Stop reason: SDUPTHER

## 2022-08-24 RX ORDER — LANCETS 30 GAUGE
EACH MISCELLANEOUS
Qty: 400 EACH | Refills: 3 | Status: SHIPPED | OUTPATIENT
Start: 2022-08-24

## 2022-08-24 RX ORDER — BLOOD SUGAR DIAGNOSTIC
STRIP MISCELLANEOUS
Qty: 600 EACH | Refills: 3 | Status: SHIPPED | OUTPATIENT
Start: 2022-08-24

## 2022-08-24 NOTE — TELEPHONE ENCOUNTER
Refill of insulin glargine, lancets and blood glucose test strips phoned to [facility]. Satish Sanches [facility],Pharm. D,, BCPS, [facility]  8/24/2022  2:59 PM

## 2022-08-24 NOTE — PROGRESS NOTES
the last week. States when she took the 3rd tablet it made her sick a bit but unsure if left over effects from recent illness. Patient has been compliant with Trulicity - taking weekly. Patient states when bloods have been elevated she intentionally skips a meal (often lunch). Discussed not to skip meals but rather to have something small/light to avoid some of the wide fluctuations in blood sugar. Patient reports her blood sugars seem to go up when she ate fruit recently. Patient has appt with Dr. Greco/endocrinology next on Sept 14th. Patient continues checking blood sugar 1-3 times a day with most days being 2-3 times. Testing fasting, prior to lunch and prior to supper (see below). Patient continues to be very stressed. Currently most stressed with sisters potential breast cancer (has a lump and having biopsy) and sister's marriage issues. Patient has had no hypoglycemia since last visit. Needs refills of metformin, test strips, lancets from [employer]. Has enough of insulin glargine and Trulicity as well as has enough pen needles. Patient took all medications this am as instructed. BP was 122/45 HR 61 then 107/51  HR 64 on 2nd check. Patient denies any dizziness/lightheadedness    Drinking more water. Also drinking cranberry juice (diet)    Patient states kitchen is clear and she just has to clean behind the fridge then she should be able to start using her exercise bike. Plans to start using her exercise bike starting on 8/29/22. Also getting a roller walker and feels she will start walking. Patient states she has a lot going on right now so not ready to set goal for exercise so will discuss at next appt and support. Patient's diet has been very different last two weeks due to illness. Encouraged patient to not skip any meals. Patient indicates she plans to go to the grocery store to get more healthy snacks she can have when she is not hungry.     []  Missed doses   []  Emergency Room Visit    []  Medication changes  []  Hospitalization   [x]  Diet changes   [x]  Acute illness   []  Activity changes      Symptoms of hypoglycemia -     [x]  None   []  Shakiness    []  Lightheadedness or dizziness   []  Confusion      []  Sweating   []  Other       Symptoms of hyperglycemia   [x]  None   []  Frequent urination    []  Increased thirst   []  Other    Medication adverse reactions   [x]  None   []  Diarrhea / Nausea / Vomiting / Constipation / flatulence   []  Hypertension   []  Peripheral edema     []  Signs of infection   []  Headache   []  Vision changes   []  Increased cholesterol    []  Weight gain   []  Change in renal function   []  Increased potassium  []  Other  Comments:      If recent hospital admission / ED visit, was this related to Diabetes No:hypotension Discharge date July 2020    Objective     PMHx:    Past Medical History:   Diagnosis Date    Anxiety     Asthma     Common cold     COPD (chronic obstructive pulmonary disease) ([facility] [address]     Depression     Diabetes mellitus ([facility] [address]     Emphysema     Fatty liver     H/O: pneumonia FEB. 2014    Hiatal hernia     Hyperlipidemia     Hypertension     Mitral valve prolapse     Psychosis ([facility] [address]     SOB (shortness of breath) on exertion     Spinal headache     Stroke ([facility] [address] 2013    Mild Lt side    Upper respiratory symptom        Social History:    Social History     Tobacco Use    Smoking status: Passive Smoke Exposure - Never Smoker    Smokeless tobacco: Never    Tobacco comments:     socially smoked when playing cards   Substance Use Topics    Alcohol use: No     Alcohol/week: 0.0 standard drinks     Comment: socially       Pertinent Labs:    Lab Results   Component Value Date    LABA1C 8.3 (H) 06/01/2022    LABA1C 8.3 (H) 02/07/2022    LABA1C 7.9 (H) 10/20/2021     Lab Results   Component Value Date    CHOL 159 06/01/2022    TRIG 146 06/01/2022    HDL 49 06/01/2022    LDLCALC 59 02/12/2020     Lab Results   Component Value Date    CREATININE 0.84 06/01/2022    BUN 18 06/01/2022     06/01/2022    K 4.7 06/01/2022     06/01/2022    CO2 25 06/01/2022     CrCl calculated at 66ml/min      Lab Results   Component Value Date/Time    ALT 27 06/01/2022 11:24 AM       Weight:  Wt Readings from Last 3 Encounters:   08/24/22 211 lb 6.4 oz (95.9 kg)   07/28/22 217 lb (98.4 kg)   06/29/22 217 lb (98.4 kg)       Current medications:  Prior to Admission medications    Medication Sig Start Date End Date Taking? Authorizing Provider   pantoprazole (PROTONIX) 40 MG tablet Take 40 mg by mouth daily    Historical Provider, MD   Insulin Pen Needle 32G X 4 MM MISC 1 each by Does not apply route daily 6/1/22   [facility]   metFORMIN (GLUCOPHAGE-XR) 500 MG extended release tablet Take 2 tablets by mouth 2 times daily (with meals)  Patient taking differently: Take 500 mg by mouth 2 times daily (with meals)  3/30/22   [facility]   blood glucose test strips (ACCU-CHEK DENISE PLUS) strip Test 3-4 times a day & as needed for symptoms of irregular blood glucose.  Dispense sufficient amount for indicated testing frequency plus additional to accommodate PRN testing needs 3/30/22   [facility]   Magnesium Oxide 500 MG TABS Take 1 tablet by mouth daily    Historical Provider, MD   insulin glargine (LANTUS SOLOSTAR) 100 UNIT/ML injection pen Inject 65 Units into the skin nightly  Patient taking differently: Inject 71 Units into the skin nightly Patient using Basaglar 11/17/21   [facility]   dorzolamide (TRUSOPT) 2 % ophthalmic solution Place 1 drop into both eyes 2 times daily     Historical Provider, MD   Dulaglutide (TRULICITY) 3 UZ/7.8OC SOPN Inject 3 mg into the skin once a week on Wednesdays 12/17/20   Bing [place]   brimonidine [facility]) 0.2 % ophthalmic solution Place 1 drop into both eyes 2 times daily  12/10/20   Historical Provider, MD   nitroGLYCERIN (NITROSTAT) 0.4 MG SL tablet Place 0.4 mg under the tongue every 5 minutes as needed for Chest pain up to max of 3 total doses. If no relief after 1 dose, call 911. Patient not taking: Reported on 6/1/2022    Historical Provider, MD   Lancets MISC Use to check blood sugar 3-4 times daily (before meals and at bedtime) and as needed for symptoms. 10/21/20   Мария Santana   albuterol sulfate  (90 Base) MCG/ACT inhaler Inhale 2 puffs into the lungs every 6 hours as needed for Wheezing    Historical Provider, MD   tiotropium (SPIRIVA RESPIMAT) 2.5 MCG/ACT AERS inhaler Inhale 2 puffs into the lungs daily    Historical Provider, MD   escitalopram (LEXAPRO) 20 MG tablet Take 20 mg by mouth nightly    Historical Provider, MD   aspirin 81 MG chewable tablet Take 81 mg by mouth    Historical Provider, MD   metoprolol succinate (TOPROL XL) 50 MG extended release tablet Take 50 mg by mouth daily  1/12/18   Historical Provider, MD   budesonide-formoterol (SYMBICORT) 160-4.5 MCG/ACT AERO 2 puffs twice a day 5/1/18   Historical Provider, MD   Cholecalciferol (VITAMIN D3) 2000 units CAPS Take by mouth daily    Historical Provider, MD   losartan (COZAAR) 50 MG tablet Take 50 mg by mouth daily  10/25/17   Historical Provider, MD   brexpiprazole (REXULTI) 0.25 MG TABS tablet Take 0.25 mg by mouth daily     Historical Provider, MD   rosuvastatin (CRESTOR) 40 MG tablet Take 40 mg by mouth every evening    Historical Provider, MD   montelukast (SINGULAIR) 10 MG tablet Take 10 mg by mouth nightly    Historical Provider, MD   loratadine (CLARITIN) 10 MG tablet Take 10 mg by mouth daily as needed (allergies)   Patient not taking: Reported on 6/29/2022    Historical Provider, MD   albuterol (PROVENTIL) (2.5 MG/3ML) 0.083% nebulizer solution Take 2.5 mg by nebulization every 6 hours as needed for Wheezing.     Historical Provider, MD       Immunizations:   Most Recent Immunizations   Administered Date(s) Administered    Influenza Virus Vaccine 11/20/2015    Pneumococcal Conjugate 13-valent ([id number]) 11/20/2015       Home Blood Glucose Results:    FBG  Prior to lunch  Prior to dinner After dinner Bed  8/24 147  8/23 124  217   130  8/22 184  138      233  8/21 144   8/20 150  8/19 191  176   212  8/18 133  276   206  8/17 112     160  8/16 115  8/15 98   8/14* 215     383  8/13* 140     213  8/12* 295  206   234  8/11* 242  171   140  8/10* 225     390  8/9* 234     130  8/8* 152     155  8/7 111  130  8/6 108  168  8/5 104  92   193  8/4 158  154   196  8/3 215  180  8/2 189  295  8/1   132   112                    Blood sugars are elevated but seem to be most elevated around patient's recent illness. No hypoglycemia. Assessment     A1c at goal: No 9.3  Blood Pressure at goal: Yes  Weight at goal: No   Physical activity: not consistently. Physical activity at goal: no  Smoking Cessation: Yes  Cholesterol at target: LDL 81  6/1/22  Annual eye exam completed: Yes   Comprehensive Foot Exam Completed: Yes  Annual urine albumin and serum creatinine: Yes    Statin: Yes    Appropriate?: Yes  Changes made:     ACE/ARB: Yes  Appropriate?: Yes  Changes made:     Aspirin: Yes  Appropriate?: Yes  Changes made:     Eating patterns:    []  My plate    []  Mediterranean diet   []  Low sodium   []  DASH diet   []  Portion control   []  Reduced calorie    []  Fast food / Restaurant  []  High glycemic index foods   []  Sugary beverages   [x]  Other     Comment: see above    Current Medications Affecting Diabetes:  Dulaglutide 3mg subQ once weekly on Wednesdays  Insulin glargine 71units subQ each evening  Metformin XR 500mg in the morning and 500 mg XR in the evening     Compliant: Yes     Barriers to medication therapy: No     Medications attempted in the past:  Metformin about 3 years ago with some diarrhea. Unsure if regular or extended release. SGLT2 [place]illa Beat) frequent yeast infections.      Recent exacerbations / new problems:       Last office dictation reviewed: yes        type 2 DM under worsening control as evidenced by A1C of 9.3 (22)    Patient would benefit from intensified therapy for better BS control. Patient has seen that her blood sugars have increased with reducing metformin dose and she would like to try increasing up to three metformin 500mg XR (1500mg total) per day. She is on maximum dose of Trulicity (3mg currently and can go to 4.5mg) a dose increase would not result in significantly more A1c/BS lowering. Patient did not tolerate SGLT2 inhibitors, would not benefit from DPP4 inhibitors (already on GLP1) and is not a candidate for pioglitazone due to heart failure. Will continue to titrate insulin dose. Plan     1. Continue Dulaglutide (Trulicity) 3mg subQ once weekly on . 2. Increase Metformin XR 500mg three times a day (with breakfast, lunch and supper)    3. Icrease Insulin glargine (Basaglar) to 73Units subQ each evening. 4. Check blood sugars at least three times daily and document in sugar log. Patient to reece down whenever she eats something she feels is a treat or splurg. Pt to call the clinic with blood sugars <70 or >200    5. Have goal to start using her exercise bike. Will work to determine goal per week at next appt. 6.  Do not skip meals. Physician Follow-up:  Every 3 months     Medication Management Follow-up:   Diabetes Service   6 weeks      Electronically signed by Yu Hernandez RPH on 2022 at 10:37 AM     For Pharmacy Admin Tracking Only    CPA in place:  Yes  Recommendation Provided To: Patient/Caregiver: 3 via In person  Intervention Detail: Adherence Monitorin, Lab(s) Ordered and Refill(s) Provided  Intervention Accepted By: Patient/Caregiver: 3  Time Spent (min): 45      Satish Sanches [facility],Pharm. D,, BCPS, CACP  2022  10:37 AM

## 2022-08-24 NOTE — DISCHARGE INSTRUCTIONS
INCREASE your metformin XR 500mg three times a day with breakfast, lunch and supper. INCREASE insulin glargine to 73 units each evening. Continue:  Dulaglutide (Trulicity) 3mg injection once a week on Wednesdays    Try not to skip meals but instead eat a small, healthy snack/small meal.    Try to start using the exercise bike at least once before next appt.

## 2022-09-13 ENCOUNTER — TELEPHONE (OUTPATIENT)
Dept: PHARMACY | Age: 67
End: 2022-09-13

## 2022-09-13 RX ORDER — METFORMIN HYDROCHLORIDE 500 MG/1
500 TABLET, EXTENDED RELEASE ORAL
Qty: 270 TABLET | Refills: 1 | Status: SHIPPED | OUTPATIENT
Start: 2022-09-13

## 2022-09-13 NOTE — TELEPHONE ENCOUNTER
New Prescription to [facility] on [address]  Metformin  mg #270  RF 1

## 2022-10-05 ENCOUNTER — HOSPITAL ENCOUNTER (OUTPATIENT)
Dept: PHARMACY | Age: 67
Setting detail: THERAPIES SERIES
Discharge: HOME | End: 2022-10-05
Payer: COMMERCIAL

## 2022-10-05 ENCOUNTER — TELEPHONE (OUTPATIENT)
Dept: PHARMACY | Age: 67
End: 2022-10-05

## 2022-10-05 VITALS
BODY MASS INDEX: 34.71 KG/M2 | HEART RATE: 74 BPM | SYSTOLIC BLOOD PRESSURE: 134 MMHG | DIASTOLIC BLOOD PRESSURE: 53 MMHG | WEIGHT: 208.6 LBS

## 2022-10-05 PROCEDURE — 99213 OFFICE O/P EST LOW 20 MIN: CPT

## 2022-10-05 RX ORDER — DULAGLUTIDE 3 MG/.5ML
3 INJECTION, SOLUTION SUBCUTANEOUS WEEKLY
Qty: 4 ADJUSTABLE DOSE PRE-FILLED PEN SYRINGE | Refills: 3 | OUTPATIENT
Start: 2022-10-05

## 2022-10-05 RX ORDER — INSULIN GLARGINE 100 [IU]/ML
76 INJECTION, SOLUTION SUBCUTANEOUS NIGHTLY
Qty: 9 ADJUSTABLE DOSE PRE-FILLED PEN SYRINGE | Refills: 3 | OUTPATIENT
Start: 2022-10-05

## 2022-10-05 NOTE — DISCHARGE INSTRUCTIONS
DECREASE your metformin XR 500mg  to once a day with breakfast.     INCREASE insulin glargine to 76 units each evening. Continue:  Dulaglutide (Trulicity) 3mg injection once a week on Wednesdays     Try not to skip meals but instead eat a small, healthy snack/small meal.     Try to start using the exercise bike. Have a goal of 10-15 min three day a week.

## 2022-10-05 NOTE — PROGRESS NOTES
NAME: Natty [facility] RECORD NUMBER:  [id number]  AGE: 73 y.o. GENDER: female  : 1955  EPISODE DATE:  10/5/2022       Ms. Andreia Giles was referred to [facility] Medication Management Services by Dr. Mraty Richard, Special instructions include: follow up every 3 months with physician in office     Goals per referral:   Fasting blood glucose:   Peak postprandial glucose: < 180  A1C: < 7%    Other goals:  Blood pressure goal: 130/80    Weight loss goal (~10%): Target weight  200 to be reached by date: 2022 (3-6 months)  Physical Activity goal:    15 minutes 3 times per week to be reached by date: 2022 (3-6 months)  Smoking Cessation   Quit Date: never smoked  Cholesterol at target:Yes    Date: 2022  Annual eye exam:    Date:  22  Comprehensive annual foot exam:   Date: 6/3/22   Annual urine Albumin and serum creatinine:   Date:  22 SrCr  0.84 mg/dL; 10/20/21 microalbumin 53 mg/L    Patient Specific Goals:  1. Lower blood sugars  2. Lower A1C    Subjective   Ms. Andreia Giles is a 73 y.o. female here for the Diabetes Service for self-management education, medication review including over the counter medications and herbal products, overall wellbeing assessment, transition of care and any needed adjustments with updates and recommendations communicated to the referring physician. Patient's name and  verified. Patient visit in person in office. Patient Findings:    Patient states she was ill mid Sept with sinus congestion, nasal drainage, chest congestion, headache, nauseated/vomiting, diarrhea. Has been feeling better since . Patient down about 3 lbs since last visit. Patient indicates when she takes her metformin she feels like she is going to throw up and this happens almost every time. Patient states when she doesn't take her metformin she does not have stomach upset. Patient has been taking metformin twice a day but wants to stop.   Indicates it happens at night but sure if is due to metformin as does not happen when does not take it. Patient insistent she wants to be off metformin. Discussed that she does not tolerate SGLT2 as had increased yeast/UTI and can not take pioglitazone due to diagnosis of heart failure. Discussed possibility of just taking metformin XR 500mg with breakfast and patient willing to try. If tolerates may try taking another 500mg with lunch. Patient indicates she can not take two tablets at once as this causes too much stomach pain. Patient has been compliant with insulin glargine 73 units each day. Patient has been compliant with Trulicity - taking weekly. Patient reports she has not been eating much fruit as did not buy any. Patient reports she has been dealing with a lot of stress. Person who is taking her to the store is hard up for money and she feels she needs to give her money. Because of this patient has been avoiding going to grocery store. Son is moving and has not been taking her. Suggested she discuss with her son to see if he can help her. Also dealing with sisters breast cancer. Also having issues with assistance programs. Offered to help fax in paperwork if this would help patient. Patient will let us know. Because of her change in grocery shopping patient does not appear to be eating as well. See meals / food eaten indicated on glucose log below. Patient cleaned off her bike. Has not used it but it is ready to use now. Discussed goal of three days a week for  10-15 min at a time. Patient has appt with Dr. Greco/endocrinology end of this month October 2022. Had to cancel appt  Sept due to illness. Patient continues checking blood sugar 1-3 times a day with most days being 2-3 times. Testing fasting, prior to lunch and prior to supper (see below). Patient has had no hypoglycemia since last visit.      Needs refills of insulin, Trulicity and pen needles from [facility] pharmacy. Has enough of glucose test strips and lancets. Refills phoned into pharmacy. BP was 134/53 HR 74     []  Missed doses   []  Emergency Room Visit    []  Medication changes  []  Hospitalization   [x]  Diet changes   [x]  Acute illness   []  Activity changes      Symptoms of hypoglycemia -     [x]  None   []  Shakiness    []  Lightheadedness or dizziness   []  Confusion      []  Sweating   []  Other       Symptoms of hyperglycemia   [x]  None   []  Frequent urination    []  Increased thirst   []  Other    Medication adverse reactions   [x]  None   []  Diarrhea / Nausea / Vomiting / Constipation / flatulence   []  Hypertension   []  Peripheral edema     []  Signs of infection   []  Headache   []  Vision changes   []  Increased cholesterol    []  Weight gain   []  Change in renal function   []  Increased potassium  []  Other  Comments:      If recent hospital admission / ED visit, was this related to Diabetes No:hypotension Discharge date July 2020    Objective     PMHx:    Past Medical History:   Diagnosis Date    Anxiety     Asthma     Common cold     COPD (chronic obstructive pulmonary disease) ([facility]     Depression     Diabetes mellitus ([facility] [address]     Emphysema     Fatty liver     H/O: pneumonia FEB. 2014    Hiatal hernia     Hyperlipidemia     Hypertension     Mitral valve prolapse     Psychosis ([facility] [address]     SOB (shortness of breath) on exertion     Spinal headache     [facility] 2013    Mild Lt side    Upper respiratory symptom        Social History:    Social History     Tobacco Use    Smoking status: Passive Smoke Exposure - Never Smoker    Smokeless tobacco: Never    Tobacco comments:     socially smoked when playing cards   Substance Use Topics    Alcohol use: No     Alcohol/week: 0.0 standard drinks     Comment: socially       Pertinent Labs:    Lab Results   Component Value Date    LABA1C 8.3 (H) 06/01/2022    LABA1C 8.3 (H) 02/07/2022    LABA1C 7.9 (H) 10/20/2021     Lab Results Component Value Date    CHOL 159 06/01/2022    TRIG 146 06/01/2022    HDL 49 06/01/2022    LDLCALC 59 02/12/2020     Lab Results   Component Value Date    CREATININE 0.84 06/01/2022    BUN 18 06/01/2022     06/01/2022    K 4.7 06/01/2022     06/01/2022    CO2 25 06/01/2022     CrCl calculated at 66ml/min      Lab Results   Component Value Date/Time    ALT 27 06/01/2022 11:24 AM       Weight:  Wt Readings from Last 3 Encounters:   10/05/22 208 lb 9.6 oz (94.6 kg)   08/24/22 211 lb 6.4 oz (95.9 kg)   07/28/22 217 lb (98.4 kg)       Current medications:  Prior to Admission medications    Medication Sig Start Date End Date Taking? Authorizing Provider   metFORMIN (GLUCOPHAGE-XR) 500 MG extended release tablet Take 1 tablet by mouth 3 times daily (with meals) 9/13/22   [facility]   blood glucose test strips (ACCU-CHEK DENISE PLUS) strip Test 3-4 times a day & as needed for symptoms of irregular blood glucose. Dispense sufficient amount for indicated testing frequency plus additional to accommodate PRN testing needs 8/24/22   [facility]   Lancets MISC Use to check blood sugar 3-4 times daily (before meals and at bedtime) and as needed for symptoms.  8/24/22   [facility]   insulin glargine (LANTUS SOLOSTAR) 100 UNIT/ML injection pen Inject 73 Units into the skin nightly 8/24/22   [facility]ana   pantoprazole (PROTONIX) 40 MG tablet Take 40 mg by mouth daily    Historical Provider, MD   Insulin Pen Needle 32G X 4 MM MISC 1 each by Does not apply route daily 6/1/22   [facility]   metFORMIN (GLUCOPHAGE-XR) 500 MG extended release tablet Take 2 tablets by mouth 2 times daily (with meals)  Patient taking differently: Take 500 mg by mouth 2 times daily (with meals) 3/30/22   [facility]   Magnesium Oxide 500 MG TABS Take 1 tablet by mouth daily    Historical Provider, MD   dorzolamide (TRUSOPT) 2 % ophthalmic solution Place 1 drop into both eyes 2 times daily     Historical Provider, MD   Dulaglutide (TRULICITY) 3 GA/5.1SD SOPN Inject 3 mg into the skin once a week on Wednesdays 12/17/20   Anna Bulla   brimonidine [facility]) 0.2 % ophthalmic solution Place 1 drop into both eyes 2 times daily  12/10/20   Historical Provider, MD   nitroGLYCERIN (NITROSTAT) 0.4 MG SL tablet Place 0.4 mg under the tongue every 5 minutes as needed for Chest pain up to max of 3 total doses. If no relief after 1 dose, call 911.     Historical Provider, MD   albuterol sulfate  (90 Base) MCG/ACT inhaler Inhale 2 puffs into the lungs every 6 hours as needed for Wheezing    Historical Provider, MD   tiotropium (SPIRIVA RESPIMAT) 2.5 MCG/ACT AERS inhaler Inhale 2 puffs into the lungs daily    Historical Provider, MD   escitalopram (LEXAPRO) 20 MG tablet Take 20 mg by mouth nightly    Historical Provider, MD   aspirin 81 MG chewable tablet Take 81 mg by mouth    Historical Provider, MD   metoprolol succinate (TOPROL XL) 50 MG extended release tablet Take 50 mg by mouth daily  1/12/18   Historical Provider, MD   budesonide-formoterol (SYMBICORT) 160-4.5 MCG/ACT AERO 2 puffs twice a day 5/1/18   Historical Provider, MD   Cholecalciferol (VITAMIN D3) 2000 units CAPS Take by mouth daily    Historical Provider, MD   losartan (COZAAR) 50 MG tablet Take 50 mg by mouth daily   Patient not taking: Reported on 8/24/2022 10/25/17   Historical Provider, MD   brexpiprazole (REXULTI) 0.25 MG TABS tablet Take 0.25 mg by mouth daily     Historical Provider, MD   rosuvastatin (CRESTOR) 40 MG tablet Take 40 mg by mouth every evening    Historical Provider, MD   montelukast (SINGULAIR) 10 MG tablet Take 10 mg by mouth nightly    Historical Provider, MD   loratadine (CLARITIN) 10 MG tablet Take 10 mg by mouth daily as needed (allergies)   Patient not taking: Reported on 6/29/2022    Historical Provider, MD   albuterol (PROVENTIL) (2.5 MG/3ML) 0.083% nebulizer solution Take 2.5 mg by nebulization every 6 hours as needed for Wheezing. Historical Provider, MD       Immunizations:   Most Recent Immunizations   Administered Date(s) Administered    Influenza Virus Vaccine 11/20/2015    Pneumococcal Conjugate 13-valent (Evin Haver) 11/20/2015       Home Blood Glucose Results:              Blood sugars remain elevated. Discussed need to have compliance with diet to control blood sugars. Discussed that with decrease in metformin dose we could see blood sugars increase. No hypoglycemia. Assessment     A1c at goal: No 9.3  Blood Pressure at goal: Yes  Weight at goal: No   Physical activity: not consistently. Physical activity at goal: no  Smoking Cessation: Yes  Cholesterol at target: LDL 81  6/1/22  Annual eye exam completed: Yes   Comprehensive Foot Exam Completed: Yes  Annual urine albumin and serum creatinine: Yes    Statin: Yes    Appropriate?: Yes  Changes made:     ACE/ARB: Yes  Appropriate?: Yes  Changes made:     Aspirin: Yes  Appropriate?: Yes  Changes made:     Eating patterns:    []  My plate    []  Mediterranean diet   []  Low sodium   []  DASH diet   []  Portion control   []  Reduced calorie    []  Fast food / Restaurant  []  High glycemic index foods   []  Sugary beverages   [x]  Other     Comment: see above    Current Medications Affecting Diabetes:  Dulaglutide 3mg subQ once weekly on Wednesdays  Insulin glargine 71units subQ each evening  Metformin XR 500mg in the morning and 500 mg XR in the evening     Compliant: Yes     Barriers to medication therapy: no    Medications attempted in the past:  Metformin about 3 years ago with some diarrhea. Unsure if regular or extended release. SGLT2 Ivett Riling) frequent yeast infections.      Recent exacerbations / new problems:       Last office dictation reviewed: yes        type 2 DM under worsening control as evidenced by A1C of 9.3 (8/22/22)    Patient would benefit from intensified therapy for better BS control. Patient understands that her blood sugars may increase with reducing metformin dose but feels she can not take more than 500mg metformin once a day at this time. She is on maximum dose of Trulicity (3mg currently and can go to 4.5mg) a dose increase would not result in significantly more A1c/BS lowering. Patient did not tolerate SGLT2 inhibitors, would not benefit from DPP4 inhibitors (already on GLP1) and is not a candidate for pioglitazone due to heart failure. Will continue to titrate insulin dose. May have to consider meal time insulin. Plan     1. Continue Dulaglutide (Trulicity) 3mg subQ once weekly on . 2. Decrease Metformin XR 500mg to once a day with breakfast.    3. Increase Insulin glargine (Basaglar) to 76 Units subQ each evening. 4. Check blood sugars at least three times daily and document in sugar log. Patient to reece down whenever she eats something she feels is a treat or splurg. Pt to call the clinic with blood sugars <70 or >200    5. Have goal to start using her exercise bike. Goal to use three days a week. 6.  Do not skip meals. Physician Follow-up:  Every 3 months     Medication Management Follow-up:   Diabetes Service   2 weeks      Electronically signed by Olga Yang RPH on 10/5/2022 at 10:44 AM     For Pharmacy [address] [place] [address] in place:  Yes  Recommendation Provided To: Patient/Caregiver: 3 via In person  Intervention Detail: Adherence Monitorin, Lab(s) Ordered and Refill(s) Provided  Intervention Accepted By: Patient/Caregiver: 3  Time Spent (min): 45      Satish Sanches RPH,Pharm. D,, BCPS, CACP  10/5/2022  10:44 AM

## 2022-10-05 NOTE — TELEPHONE ENCOUNTER
Refills of Trulicity, insulin glargine and pen needles phoned into [facility]. Satish Sanches [facility],Pharm. D,, BCPS, CACP  10/5/2022  8:05 PM

## 2022-10-19 ENCOUNTER — HOSPITAL ENCOUNTER (OUTPATIENT)
Dept: PHARMACY | Age: 67
Setting detail: THERAPIES SERIES
Discharge: HOME | End: 2022-10-19
Payer: COMMERCIAL

## 2022-10-19 VITALS — BODY MASS INDEX: 34.78 KG/M2 | WEIGHT: 209 LBS

## 2022-10-19 PROCEDURE — 99213 OFFICE O/P EST LOW 20 MIN: CPT

## 2022-10-19 NOTE — DISCHARGE INSTRUCTIONS
Continue your metformin XR 500mg at once a day with breakfast.  Try to take two tablets with breakfast if you can or take one tablet with lunch if you are able. INCREASE insulin glargine to 78 units each evening. Continue:  Dulaglutide (Trulicity) 3mg injection once a week on Wednesdays     Try not to skip meals but instead eat a small, healthy snack/small meal.     Try to start using the exercise bike. Have a goal of 10-15 min three day a week.

## 2022-10-19 NOTE — PROGRESS NOTES
NAME: [facility] RECORD NUMBER:  [id number]  AGE: 73 y.o. GENDER: female  : 1955  EPISODE DATE:  10/19/2022       Ms. Geovanna Roy was referred to [facility] by Dr. Koko Dominguez, Special instructions include: follow up every 3 months with physician in office     Goals per referral:   Fasting blood glucose:   Peak postprandial glucose: < 180  A1C: < 7%    Other goals:  Blood pressure goal: 130/80    Weight loss goal (~10%): Target weight  200 to be reached by date: 2022 (3-6 months)  Physical Activity goal:    15 minutes 3 times per week to be reached by date: 2022 (3-6 months)  Smoking Cessation   Quit Date: never smoked  Cholesterol at target:Yes    Date: 2022  Annual eye exam:    Date:  22  Comprehensive annual foot exam:   Date: 6/3/22   Annual urine Albumin and serum creatinine:   Date:  22 SrCr  0.84 mg/dL; 10/20/21 microalbumin 53 mg/L    Patient Specific Goals:  1. Lower blood sugars  2. Lower A1C    Subjective   Ms. Geovanna Roy is a 73 y.o. female here for the Diabetes Service for self-management education, medication review including over the counter medications and herbal products, overall wellbeing assessment, transition of care and any needed adjustments with updates and recommendations communicated to the referring physician. Patient's name and  verified. Patient visit in person in office. Patient Findings:    Sister had surgery and went well. Does not have cancer so has decreased stress on patient significantly. Patient states had to request new PIP paperwork and still waiting for. Very very nervous about getting PIP approved as states if it is not she is at risk for having her heat / utilities turned off. Offered to help fax in paperwork for patient if would help but patient indicates she plans to go to [place] [id number] to have faxed. Patient also trying to reapply for TARPS.  Current approval thru 10/21/22. Helped patient with some paperwork. Some paperwork needs filled out by provider. Patient will drop off at PCP. Doing one metformin 500mg at breakfast all the time. States she is tolerating it well. Taking a second 500mg tablet in afternoon with lunch if able. Has done 3 times in past week. May consider taking two tablets with breakfast.    Patient compliant with insulin glargine at 76 units once a day most of the time. . Does admit to being late with a dose a couple days ago. Compliant with Trulicity. Has enough of all diabetic medications and enough diabetic testing supplies. Blood sugar logs reviewed at this appt  (see below). Blood sugars are extremely variable and range from low 100's to high 200's with periodic values into 300's and low 400's  Elevated blood sugars more later in the day and may be associated with diet. Discussed sliding scale insulin as an option to consider in future. Patient is already on GLP-1 and is not able to tolerate higher dose of metformin or SGLT2 due to UTI's. Patient with heart failure so will avoid pioglitazone. Already on high dose of insulin glargine. Patient willing to consider but feels majority of issue with blood sugar is stress from issues with PIP. Agrees to wait until next appt when she should have issues with PIP andTARPS worked out. If still elevated blood sugars may consider adding meal time sliding scale insulin  to one or more meals per day. Patient states son took her to grocery store so does have more healthy food. Endocrinolgoy Appt at end of October (10/30) Will have A1c done at this visit. Patient has had no hypoglycemia since last visit.        []  Missed doses   []  Emergency Room Visit    []  Medication changes  []  Hospitalization   []  Diet changes   []  Acute illness   []  Activity changes      Symptoms of hypoglycemia -     [x]  None   []  Shakiness    []  Lightheadedness or dizziness   [] Confusion      []  Sweating   []  Other       Symptoms of hyperglycemia   [x]  None   []  Frequent urination    []  Increased thirst   []  Other    Medication adverse reactions   [x]  None   []  Diarrhea / Nausea / Vomiting / Constipation / flatulence   []  Hypertension   []  Peripheral edema     []  Signs of infection   []  Headache   []  Vision changes   []  Increased cholesterol    []  Weight gain   []  Change in renal function   []  Increased potassium  []  Other  Comments:      If recent hospital admission / ED visit, was this related to Diabetes No:hypotension Discharge date July 2020    Objective     PMHx:    Past Medical History:   Diagnosis Date    Anxiety     Asthma     Common cold     COPD (chronic obstructive pulmonary disease) [facility]     Depression     [facility]     Emphysema     Fatty liver     H/O: pneumonia FEB. 2014    Hiatal hernia     Hyperlipidemia     Hypertension     Mitral valve prolapse     Psychosis ([facility]     SOB (shortness of breath) on exertion     Spinal headache     Stroke ([facility] 2013    Mild Lt side    Upper respiratory symptom        Social History:    Social History     Tobacco Use    Smoking status: Passive Smoke Exposure - Never Smoker    Smokeless tobacco: Never    Tobacco comments:     socially smoked when playing cards   Substance Use Topics    Alcohol use: No     Alcohol/week: 0.0 standard drinks     Comment: socially       Pertinent Labs:    Lab Results   Component Value Date    LABA1C 8.3 (H) 06/01/2022    LABA1C 8.3 (H) 02/07/2022    LABA1C 7.9 (H) 10/20/2021     Lab Results   Component Value Date    CHOL 159 06/01/2022    TRIG 146 06/01/2022    HDL 49 06/01/2022    LDLCALC 59 02/12/2020     Lab Results   Component Value Date    CREATININE 0.84 06/01/2022    BUN 18 06/01/2022     06/01/2022    K 4.7 06/01/2022     06/01/2022    CO2 25 06/01/2022     CrCl calculated at 66ml/min      Lab Results   Component Value Date/Time    ALT 27 06/01/2022 11:24 AM Weight:  Wt Readings from Last 3 Encounters:   10/19/22 209 lb (94.8 kg)   10/05/22 208 lb 9.6 oz (94.6 kg)   08/24/22 211 lb 6.4 oz (95.9 kg)       Current medications:  Prior to Admission medications    Medication Sig Start Date End Date Taking? Authorizing Provider   insulin glargine (LANTUS SOLOSTAR) 100 UNIT/ML injection pen Inject 76 Units into the skin nightly 10/5/22   Danielle Santana   Dulaglutide (TRULICITY) 3 XM/2.4GJ SOPN Inject 3 mg into the skin once a week on Wednesdays 10/5/22   Kavita Crouch   Insulin Pen Needle 32G X 4 MM MISC Use daily to inject insulin. 10/5/22   Kavita Crouch   metFORMIN (GLUCOPHAGE-XR) 500 MG extended release tablet Take 1 tablet by mouth 3 times daily (with meals)  Patient taking differently: Take 500 mg by mouth daily (with breakfast) 9/13/22   Kavita Crouch   blood glucose test strips (ACCU-CHEK DENISE PLUS) strip Test 3-4 times a day & as needed for symptoms of irregular blood glucose. Dispense sufficient amount for indicated testing frequency plus additional to accommodate PRN testing needs 8/24/22   Kavita Crouch   Lancets MISC Use to check blood sugar 3-4 times daily (before meals and at bedtime) and as needed for symptoms.  8/24/22   Danielle Santana   pantoprazole (PROTONIX) 40 MG tablet Take 40 mg by mouth daily    Historical Provider, MD   metFORMIN (GLUCOPHAGE-XR) 500 MG extended release tablet Take 2 tablets by mouth 2 times daily (with meals)  Patient taking differently: Take 500 mg by mouth daily (with breakfast) 3/30/22   Kavita Crouch   Magnesium Oxide 500 MG TABS Take 1 tablet by mouth daily    Historical Provider, MD   dorzolamide (TRUSOPT) 2 % ophthalmic solution Place 1 drop into both eyes 2 times daily     Historical Provider, MD   brimonidine (ALPHAGAN) 0.2 % ophthalmic solution Place 1 drop into both eyes 2 times daily  12/10/20   Historical Provider, MD nitroGLYCERIN (NITROSTAT) 0.4 MG SL tablet Place 0.4 mg under the tongue every 5 minutes as needed for Chest pain up to max of 3 total doses. If no relief after 1 dose, call 911. Historical Provider, MD   albuterol sulfate  (90 Base) MCG/ACT inhaler Inhale 2 puffs into the lungs every 6 hours as needed for Wheezing    Historical Provider, MD   tiotropium (SPIRIVA RESPIMAT) 2.5 MCG/ACT AERS inhaler Inhale 2 puffs into the lungs daily    Historical Provider, MD   escitalopram (LEXAPRO) 20 MG tablet Take 20 mg by mouth nightly    Historical Provider, MD   aspirin 81 MG chewable tablet Take 81 mg by mouth    Historical Provider, MD   metoprolol succinate (TOPROL XL) 50 MG extended release tablet Take 50 mg by mouth daily  1/12/18   Historical Provider, MD   budesonide-formoterol (SYMBICORT) 160-4.5 MCG/ACT AERO 2 puffs twice a day 5/1/18   Historical Provider, MD   Cholecalciferol (VITAMIN D3) 2000 units CAPS Take by mouth daily    Historical Provider, MD   losartan (COZAAR) 50 MG tablet Take 50 mg by mouth daily   Patient not taking: Reported on 8/24/2022 10/25/17   Historical Provider, MD   brexpiprazole (REXULTI) 0.25 MG TABS tablet Take 0.25 mg by mouth daily     Historical Provider, MD   rosuvastatin (CRESTOR) 40 MG tablet Take 40 mg by mouth every evening    Historical Provider, MD   montelukast (SINGULAIR) 10 MG tablet Take 10 mg by mouth nightly    Historical Provider, MD   loratadine (CLARITIN) 10 MG tablet Take 10 mg by mouth daily as needed (allergies)   Patient not taking: Reported on 6/29/2022    Historical Provider, MD   albuterol (PROVENTIL) (2.5 MG/3ML) 0.083% nebulizer solution Take 2.5 mg by nebulization every 6 hours as needed for Wheezing.     Historical Provider, MD       Immunizations:   Most Recent Immunizations   Administered Date(s) Administered    Influenza Virus Vaccine 11/20/2015    Pneumococcal Conjugate 13-valent (Vanessa Lopez) 11/20/2015       Home Blood Glucose Results: Blood sugars remain elevated. Discussed need to have compliance with diet to control blood sugars. No hypoglycemia. Assessment     A1c at goal: No 9.3  Blood Pressure at goal: Yes  Weight at goal: No   Physical activity: not consistently. Physical activity at goal: no  Smoking Cessation: Yes  Cholesterol at target: LDL 81  6/1/22  Annual eye exam completed: Yes   Comprehensive Foot Exam Completed: Yes  Annual urine albumin and serum creatinine: Yes    Statin: Yes    Appropriate?: Yes  Changes made:     ACE/ARB: Yes  Appropriate?: Yes  Changes made:     Aspirin: Yes  Appropriate?: Yes  Changes made:     Eating patterns:    []  My plate    []  Mediterranean diet   []  Low sodium   []  DASH diet   []  Portion control   []  Reduced calorie    []  Fast food / Restaurant  []  High glycemic index foods   []  Sugary beverages   [x]  Other     Comment: see above    Current Medications Affecting Diabetes:  Dulaglutide 3mg subQ once weekly on Wednesdays  Insulin glargine 76units subQ each evening  Metformin XR 500mg in the morning. Compliant: Yes     Barriers to medication therapy: no    Medications attempted in the past:  Metformin about 3 years ago with some diarrhea. Unsure if regular or extended release. SGLT2 Sandrita Honeycutt) frequent yeast infections. Recent exacerbations / new problems:       Last office dictation reviewed: yes        type 2 DM under worsening control as evidenced by A1C of 9.3 (8/22/22)      Plan     1. Continue Dulaglutide (Trulicity) 3mg subQ once weekly on Wednesdays. 2 Continue Metformin XR 500mg to once a day with breakfast.  Take 500mg with lunch and increase to 1000mg with breakfast when able. 3. Increase Insulin glargine (Basaglar) to 78 Units subQ each evening. 4. Check blood sugars at least three times daily and document in sugar log. Patient to reece down whenever she eats something she feels is a treat or splurg.   Pt to call the clinic with blood sugars <70 or >200    5. Have goal to start using her exercise bike. Goal to use three days a week. Physician Follow-up:  Every 3 months     Medication Management Follow-up:   Diabetes Service   4 weeks      Electronically signed by Yeison Pacheco RPH on 10/19/2022 at 9:14 PM     For Pharmacy [address] [place] [address] in place:  Yes  Recommendation Provided To: Patient/Caregiver: 3 via In person  Intervention Detail: Adherence Monitorin, Lab(s) Ordered and Refill(s) Provided  Intervention Accepted By: Patient/Caregiver: 3  Time Spent (min): 45      Satish Sanches RPH,Pharm. D,, BCPS, CACP  10/19/2022  9:14 PM

## 2022-10-31 LAB — HBA1C MFR BLD: 9.3 %

## 2022-11-15 NOTE — PROGRESS NOTES
Comment: socially smoked when playing cards    Alcohol use No      Comment: socially        Current Outpatient Prescriptions   Medication Sig Dispense Refill    FARXIGA 10 MG tablet       FLUARIX QUADRIVALENT 0.5 ML injection inject 0.5 milliliter intramuscularly  0    losartan (COZAAR) 50 MG tablet       brexpiprazole (REXULTI) 1 MG TABS tablet Take 1 mg by mouth daily      TRULICITY 1.5 QD/6.8VO SOPN       SYMBICORT 160-4.5 MCG/ACT AERO       rosuvastatin (CRESTOR) 40 MG tablet Take 40 mg by mouth every evening      montelukast (SINGULAIR) 10 MG tablet Take 10 mg by mouth nightly      metoprolol succinate ER (TOPROL-XL) 50 MG XL tablet Take 1 tablet by mouth daily 30 tablet 3    pantoprazole (PROTONIX) 40 MG tablet Take 1 tablet by mouth daily 30 tablet 3    sertraline (ZOLOFT) 100 MG tablet   0    loratadine (CLARITIN) 10 MG tablet Take 10 mg by mouth daily      glipiZIDE (GLUCOTROL) 10 MG tablet Take 1 tablet by mouth 2 times daily  0    naproxen (NAPROSYN) 500 MG tablet Take 1 tablet by mouth 2 times daily (with meals) 60 tablet 3    ONE TOUCH ULTRA TEST strip       aspirin 81 MG tablet Take 81 mg by mouth daily      albuterol (VENTOLIN HFA) 108 (90 BASE) MCG/ACT inhaler Inhale 2 puffs into the lungs every 6 hours as needed for Wheezing.  benztropine (COGENTIN) 0.5 MG tablet TAKE 1 TABLET BY MOUTH AT BEDTIME FOR 1 WEEK THEN 1 TABLET EVERY 12 HOURS THERE AFTER  0    Diclofenac Sodium POWD Formula #5: diclo3%+gaba6%+lido2%+prilo2%. Apply 1-2 gm topically to affected area TID-QID.  120 g 0    QUEtiapine (SEROQUEL XR) 50 MG extended release tablet Take 50 mg by mouth nightly      ARIPiprazole (ABILIFY) 5 MG tablet Take 5 mg by mouth daily      levofloxacin (LEVAQUIN) 500 MG/100ML SOLN Take 500 mg by mouth every 24 hours      mometasone (ASMANEX) 100 MCG/ACT AERO inhaler Inhale 2 puffs into the lungs 2 times daily      Gabapentin POWD Formula 8E: Detail Level: Zone Detail Level: Generalized Quality 137: Melanoma: Continuity Of Care - Recall System: Patient information entered into a recall system that includes: target date for the next exam specified AND a process to follow up with patients regarding missed or unscheduled appointments Detail Level: Detailed When Should The Patient Follow-Up For Their Next Full-Body Skin Exam?: 6 Months

## 2022-11-16 ENCOUNTER — HOSPITAL ENCOUNTER (OUTPATIENT)
Dept: PHARMACY | Age: 67
Setting detail: THERAPIES SERIES
Discharge: HOME | End: 2022-11-16
Payer: COMMERCIAL

## 2022-11-16 VITALS
DIASTOLIC BLOOD PRESSURE: 82 MMHG | BODY MASS INDEX: 35.16 KG/M2 | SYSTOLIC BLOOD PRESSURE: 150 MMHG | HEART RATE: 101 BPM | WEIGHT: 211.3 LBS

## 2022-11-16 PROCEDURE — 99213 OFFICE O/P EST LOW 20 MIN: CPT

## 2022-11-16 NOTE — DISCHARGE INSTRUCTIONS
Have Charlotte Clemons fill out paper work for [employer] and walker. Try to take your metformin XR 500mg at once a day with biggest meal.      Continue insulin glargine at 78 units each evening. Continue Dulaglutide (Trulicity) 4.5 mg injection once a week on Wednesdays     Try not to skip meals but instead eat a small, healthy snack/small meal.     Try to start using the exercise bike. Have a goal of 10-15 min three day a week.

## 2022-11-16 NOTE — PROGRESS NOTES
NAME: [facility] RECORD NUMBER:  [id number]  AGE: 73 y.o. GENDER: female  : 1955  EPISODE DATE:  2022       Ms. Israel Oconnor was referred to [facility] by Dr. Akiko Cunningham, Special instructions include: follow up every 3 months with physician in office     Goals per referral:   Fasting blood glucose:   Peak postprandial glucose: < 180  A1C: < 7%    Other goals:  Blood pressure goal: 130/80    Weight loss goal (~10%): Target weight  200 to be reached by date: 2022 (3-6 months)  Physical Activity goal:    15 minutes 3 times per week to be reached by date: 2022 (3-6 months)  Smoking Cessation   Quit Date: never smoked  Cholesterol at target:Yes    Date: 2022  Annual eye exam:    Date:  22  Comprehensive annual foot exam:   Date: 6/3/22   Annual urine Albumin and serum creatinine:   Date:  22 SrCr  0.84 mg/dL; 10/20/21 microalbumin 53 mg/L    Patient Specific Goals:  1. Lower blood sugars  2. Lower A1C    Subjective   Ms. Israel Oconnor is a 73 y.o. female here for the Diabetes Service for self-management education, medication review including over the counter medications and herbal products, overall wellbeing assessment, transition of care and any needed adjustments with updates and recommendations communicated to the referring physician. Patient's name and  verified. Patient visit in person in office. Patient Findings:    Patient got PIP paperwork done and is qualified again. This has reduced stress a lot but patient indicates she is still very stressed. States stressed about such things as going to the store for groceries. Still working on reapplying for [employer]. Will see provider on  to get paperwork filled out. Patient states she is afraid to use [employer] for fear of being left but states if she has [employer] coverage she can ride regular [employer] buses for free.   Patient needs transportation to get to grocery store to get healthy food choices. Eating a lot of startches and junk food. Does not have meat currently other than eggs due to not being to store in about a month. Son is very busy so she can not count on them for rides. Considering taking the bus. Eating senior meals that are delivered. Gets 5 per week. Patient has not taken Metformin for last 5 days. States it is causing her too much stomach discomfort (vomitting, sick to stomach, and gaggy). Patient is willing to try again as she did tolerate in the past.  Unsure if 500mg a day will improve blood sugar significantly but anything will be helpful       Was increased to Trulicity 1.4QH once a week on Wednesdays by endocrinology. Will start next week on 11/22. Patient was to endocrinology on 10/31/22. A1c done by POC reported at 9.3. Next appt on 12/30/22 and 3/30/23. Patient has orders for Lipids, microalbumin, BMP and TSH. Paperwork states she does not need to fast but educated her she does need to fast for lipid testing. Patient will get done in next week or two. Patient compliant with insulin glargine at 78 units once a day. No missed doses per patient. Has enough of all diabetic medications and enough diabetic testing supplies. Discussed mealtime insulin as patient's blood sugar control (see below) continues to be uncontrolled. Patient understands this may be option she needs to accept but at this point is not wiling to start now. Wants to see if getting to grocery store and having healthier food choices will improve. Expressed understanding and will wait to see control at next appt. Discussed we could start with meal time insulin even once a day with largest meal if she needs at next appt. Patient needs rolling walker. Has paperwork to take to PCP. Discussed exercise  Bike is cleaned off and available. She just needs to start using.   Has been walking to store [employer] general) or carry out  but only a couple times per week. Patient has had no hypoglycemia since last visit. [x]  Missed doses   []  Emergency Room Visit    [x]  Medication changes  []  Hospitalization   [x]  Diet changes   []  Acute illness   []  Activity changes      Symptoms of hypoglycemia -     [x]  None   []  Shakiness    []  Lightheadedness or dizziness   []  Confusion      []  Sweating   []  Other       Symptoms of hyperglycemia   [x]  None   []  Frequent urination    []  Increased thirst   []  Other    Medication adverse reactions   [x]  None   []  Diarrhea / Nausea / Vomiting / Constipation / flatulence   []  Hypertension   []  Peripheral edema     []  Signs of infection   []  Headache   []  Vision changes   []  Increased cholesterol    []  Weight gain   []  Change in renal function   []  Increased potassium  []  Other  Comments:      If recent hospital admission / ED visit, was this related to Diabetes No:hypotension Discharge date July 2020    Objective     PMHx:    Past Medical History:   Diagnosis Date    Anxiety     Asthma     Common cold     COPD (chronic obstructive pulmonary disease) ([facility] [address]     Depression     Di[facility]     Emphysema     Fatty liver     H/O: pneumonia FEB. 2014    Hiatal hernia     Hyperlipidemia     Hypertension     Mitral valve prolapse     Psychosis ([facility]     SOB (shortness of breath) on exertion     Spinal headache     Stroke ([facility] 2013    Mild Lt side    Upper respiratory symptom        Social History:    Social History     Tobacco Use    Smoking status: Passive Smoke Exposure - Never Smoker    Smokeless tobacco: Never    Tobacco comments:     socially smoked when playing cards   Substance Use Topics    Alcohol use: No     Alcohol/week: 0.0 standard drinks     Comment: socially       Pertinent Labs:    Lab Results   Component Value Date    LABA1C 9.3 10/31/2022    LABA1C 8.3 (H) 06/01/2022    LABA1C 8.3 (H) 02/07/2022     Lab Results   Component Value Date    CHOL 159 06/01/2022    TRIG 146 06/01/2022    HDL 49 06/01/2022    LDLCALC 59 02/12/2020     Lab Results   Component Value Date    CREATININE 0.84 06/01/2022    BUN 18 06/01/2022     06/01/2022    K 4.7 06/01/2022     06/01/2022    CO2 25 06/01/2022     CrCl calculated at 66ml/min      Lab Results   Component Value Date/Time    ALT 27 06/01/2022 11:24 AM       Weight:  Wt Readings from Last 3 Encounters:   11/16/22 211 lb 4.8 oz (95.8 kg)   10/19/22 209 lb (94.8 kg)   10/05/22 208 lb 9.6 oz (94.6 kg)       Current medications:  Prior to Admission medications    Medication Sig Start Date End Date Taking? Authorizing Provider   insulin glargine (LANTUS SOLOSTAR) 100 UNIT/ML injection pen Inject 76 Units into the skin nightly  Patient taking differently: Inject 78 Units into the skin nightly 10/5/22   Praveen Santana   Dulaglutide (TRULICITY) 3 YE/9.3KU SOPN Inject 3 mg into the skin once a week on Wednesdays 10/5/22   [employer]   Insulin Pen Needle 32G X 4 MM MISC Use daily to inject insulin. 10/5/22   Fernandez Cervaliss   metFORMIN (GLUCOPHAGE-XR) 500 MG extended release tablet Take 1 tablet by mouth 3 times daily (with meals)  Patient taking differently: Take 500 mg by mouth daily (with breakfast) 9/13/22   [employer]   blood glucose test strips (ACCU-CHEK DENISE PLUS) strip Test 3-4 times a day & as needed for symptoms of irregular blood glucose. Dispense sufficient amount for indicated testing frequency plus additional to accommodate PRN testing needs 8/24/22   [employer]   Lancets MISC Use to check blood sugar 3-4 times daily (before meals and at bedtime) and as needed for symptoms.  8/24/22   Praveen Santana   pantoprazole (PROTONIX) 40 MG tablet Take 40 mg by mouth daily    Historical Provider, MD   metFORMIN (GLUCOPHAGE-XR) 500 MG extended release tablet Take 2 tablets by mouth 2 times daily (with meals)  Patient taking differently: Take 500 mg by mouth daily (with breakfast) 3/30/22   Shu James   Magnesium Oxide 500 MG TABS Take 1 tablet by mouth daily    Historical Provider, MD   dorzolamide (TRUSOPT) 2 % ophthalmic solution Place 1 drop into both eyes 2 times daily     Historical Provider, MD   brimonidine (ALPHAGAN) 0.2 % ophthalmic solution Place 1 drop into both eyes 2 times daily  12/10/20   Historical Provider, MD   nitroGLYCERIN (NITROSTAT) 0.4 MG SL tablet Place 0.4 mg under the tongue every 5 minutes as needed for Chest pain up to max of 3 total doses. If no relief after 1 dose, call 911.     Historical Provider, MD   albuterol sulfate  (90 Base) MCG/ACT inhaler Inhale 2 puffs into the lungs every 6 hours as needed for Wheezing    Historical Provider, MD   tiotropium (SPIRIVA RESPIMAT) 2.5 MCG/ACT AERS inhaler Inhale 2 puffs into the lungs daily    Historical Provider, MD   escitalopram (LEXAPRO) 20 MG tablet Take 20 mg by mouth nightly    Historical Provider, MD   aspirin 81 MG chewable tablet Take 81 mg by mouth    Historical Provider, MD   metoprolol succinate (TOPROL XL) 50 MG extended release tablet Take 50 mg by mouth daily  1/12/18   Historical Provider, MD   budesonide-formoterol (SYMBICORT) 160-4.5 MCG/ACT AERO 2 puffs twice a day 5/1/18   Historical Provider, MD   Cholecalciferol (VITAMIN D3) 2000 units CAPS Take by mouth daily    Historical Provider, MD   losartan (COZAAR) 50 MG tablet Take 50 mg by mouth daily   Patient not taking: Reported on 8/24/2022 10/25/17   Historical Provider, MD   brexpiprazole (REXULTI) 0.25 MG TABS tablet Take 0.25 mg by mouth daily     Historical Provider, MD   rosuvastatin (CRESTOR) 40 MG tablet Take 40 mg by mouth every evening    Historical Provider, MD   montelukast (SINGULAIR) 10 MG tablet Take 10 mg by mouth nightly    Historical Provider, MD   loratadine (CLARITIN) 10 MG tablet Take 10 mg by mouth daily as needed (allergies)   Patient not taking: Reported on 6/29/2022    Historical (10/31/22)      Plan     1. Increase Dulaglutide (Trulicity)  to 2.1UR subQ once weekly on . 2 Continue Metformin XR 500mg to once a day with biggest meal of the day if able to tolerate. 3. Continue Insulin glargine (Basaglar) at 78 Units subQ each evening. 4. Check blood sugars at least three times daily and document in sugar log. Patient to reece down whenever she eats something she feels is a treat or splurg. Pt to call the clinic with blood sugars <70 or >200    5. Have goal to start using her exercise bike. Goal to use three days a week. Physician Follow-up:  Every 3 months     Medication Management Follow-up:   Diabetes Service   4 weeks      Electronically signed by Cong Sher RP on 2022 at 5:20 PM     [address] in place:  Yes  Recommendation Provided To: Patient/Caregiver: 3 via In person  Intervention Detail: Adherence Monitorin, Lab(s) Ordered and Refill(s) Provided  Intervention Accepted By: Patient/Caregiver: 3  Time Spent (min): 45      Satish Sanches [facility],Pharm. D,, BCPS, CACP  2022  5:20 PM

## 2022-12-14 ENCOUNTER — HOSPITAL ENCOUNTER (OUTPATIENT)
Age: 67
Discharge: HOME | End: 2022-12-14
Payer: COMMERCIAL

## 2022-12-14 ENCOUNTER — HOSPITAL ENCOUNTER (OUTPATIENT)
Dept: PHARMACY | Age: 67
Setting detail: THERAPIES SERIES
Discharge: HOME | End: 2022-12-14
Payer: COMMERCIAL

## 2022-12-14 VITALS — WEIGHT: 211.4 LBS | BODY MASS INDEX: 35.18 KG/M2

## 2022-12-14 LAB
ABSOLUTE EOS #: 0.31 K/UL (ref 0–0.4)
ABSOLUTE LYMPH #: 2.57 K/UL (ref 1–4.8)
ABSOLUTE MONO #: 0.7 K/UL (ref 0.1–1.3)
ALBUMIN SERPL-MCNC: 4 G/DL (ref 3.5–5.2)
ALP BLD-CCNC: 85 U/L (ref 35–104)
ALT SERPL-CCNC: 31 U/L (ref 5–33)
ANION GAP SERPL CALCULATED.3IONS-SCNC: 9 MMOL/L (ref 9–17)
AST SERPL-CCNC: 33 U/L
BACTERIA: ABNORMAL
BASOPHILS # BLD: 1 % (ref 0–2)
BASOPHILS ABSOLUTE: 0.08 K/UL (ref 0–0.2)
BILIRUB SERPL-MCNC: 0.5 MG/DL (ref 0.3–1.2)
BILIRUBIN URINE: NEGATIVE
BUN BLDV-MCNC: 14 MG/DL (ref 8–23)
CALCIUM SERPL-MCNC: 9.7 MG/DL (ref 8.6–10.4)
CHLORIDE BLD-SCNC: 101 MMOL/L (ref 98–107)
CHOLESTEROL/HDL RATIO: 3.7
CHOLESTEROL: 172 MG/DL
CO2: 25 MMOL/L (ref 20–31)
COLOR: YELLOW
CREAT SERPL-MCNC: 0.82 MG/DL (ref 0.5–0.9)
CREATININE URINE: 107 MG/DL (ref 28–217)
EOSINOPHILS RELATIVE PERCENT: 4 % (ref 0–4)
EPITHELIAL CELLS UA: ABNORMAL /HPF
GFR SERPL CREATININE-BSD FRML MDRD: >60 ML/MIN/1.73M2
GLUCOSE BLD-MCNC: 100 MG/DL (ref 70–99)
GLUCOSE URINE: NEGATIVE
HCT VFR BLD CALC: 35.3 % (ref 36–46)
HDLC SERPL-MCNC: 47 MG/DL
HEMOGLOBIN: 11.4 G/DL (ref 12–16)
KETONES, URINE: NEGATIVE
LDL CHOLESTEROL: 98 MG/DL (ref 0–130)
LEUKOCYTE ESTERASE, URINE: ABNORMAL
LYMPHOCYTES # BLD: 33 % (ref 24–44)
MCH RBC QN AUTO: 25 PG (ref 26–34)
MCHC RBC AUTO-ENTMCNC: 32.2 G/DL (ref 31–37)
MCV RBC AUTO: 77.7 FL (ref 80–100)
MICROALBUMIN/CREAT 24H UR: 55 MG/L
MICROALBUMIN/CREAT UR-RTO: 51 MCG/MG CREAT
MONOCYTES # BLD: 9 % (ref 1–7)
MORPHOLOGY: ABNORMAL
NITRITE, URINE: NEGATIVE
PDW BLD-RTO: 16.3 % (ref 11.5–14.9)
PH UA: 5 (ref 5–8)
PLATELET # BLD: 281 K/UL (ref 150–450)
PMV BLD AUTO: 8.4 FL (ref 6–12)
POTASSIUM SERPL-SCNC: 4.2 MMOL/L (ref 3.7–5.3)
PROTEIN UA: NEGATIVE
RBC # BLD: 4.55 M/UL (ref 4–5.2)
RBC UA: ABNORMAL /HPF
SEG NEUTROPHILS: 53 % (ref 36–66)
SEGMENTED NEUTROPHILS ABSOLUTE COUNT: 4.14 K/UL (ref 1.3–9.1)
SODIUM BLD-SCNC: 135 MMOL/L (ref 135–144)
SPECIFIC GRAVITY UA: 1.02 (ref 1–1.03)
TOTAL PROTEIN: 7.5 G/DL (ref 6.4–8.3)
TRIGL SERPL-MCNC: 137 MG/DL
TSH SERPL DL<=0.05 MIU/L-ACNC: 0.83 UIU/ML (ref 0.3–5)
TURBIDITY: ABNORMAL
URINE HGB: NEGATIVE
UROBILINOGEN, URINE: NORMAL
VITAMIN D 25-HYDROXY: 72.5 NG/ML
WBC # BLD: 7.8 K/UL (ref 3.5–11)
WBC UA: ABNORMAL /HPF
YEAST: ABNORMAL

## 2022-12-14 PROCEDURE — 82306 VITAMIN D 25 HYDROXY: CPT

## 2022-12-14 PROCEDURE — 84443 ASSAY THYROID STIM HORMONE: CPT

## 2022-12-14 PROCEDURE — 80061 LIPID PANEL: CPT

## 2022-12-14 PROCEDURE — 99213 OFFICE O/P EST LOW 20 MIN: CPT

## 2022-12-14 PROCEDURE — 36415 COLL VENOUS BLD VENIPUNCTURE: CPT

## 2022-12-14 PROCEDURE — 83036 HEMOGLOBIN GLYCOSYLATED A1C: CPT

## 2022-12-14 PROCEDURE — 80053 COMPREHEN METABOLIC PANEL: CPT

## 2022-12-14 PROCEDURE — 82043 UR ALBUMIN QUANTITATIVE: CPT

## 2022-12-14 PROCEDURE — 85025 COMPLETE CBC W/AUTO DIFF WBC: CPT

## 2022-12-14 PROCEDURE — 82570 ASSAY OF URINE CREATININE: CPT

## 2022-12-14 PROCEDURE — 81001 URINALYSIS AUTO W/SCOPE: CPT

## 2022-12-14 NOTE — DISCHARGE INSTRUCTIONS
Try to take your metformin XR 500mg with breakfast and dinner. Continue insulin glargine at 78 units each evening. Continue Dulaglutide (Trulicity) 4.5 mg injection once a week on Wednesdays     Try not to skip meals but instead eat a small, healthy snack/small meal.     Try to start using the exercise bike. Have a goal of 10-15 min three day a week and increase as you are able.

## 2022-12-14 NOTE — PROGRESS NOTES
NAME: [facility] RECORD NUMBER:  [id number]  AGE: 78 y.o. GENDER: female  : 1955  EPISODE DATE:  2022       Ms. Florentino Blum was referred to [facility] by Dr. Kota Castro, Special instructions include: follow up every 3 months with physician in office     Goals per referral:   Fasting blood glucose:   Peak postprandial glucose: < 180  A1C: < 7%    Other goals:  Blood pressure goal: 130/80    Weight loss goal (~10%): Target weight  200 to be reached by date: 2022 (3-6 months)  Physical Activity goal:    15 minutes 3 times per week to be reached by date: 2022 (3-6 months)  Smoking Cessation   Quit Date: never smoked  Cholesterol at target:Yes    Date: 2022  Annual eye exam:    Date:  22  Comprehensive annual foot exam:   Date: 6/3/22   Annual urine Albumin and serum creatinine:   Date:  22 SrCr  0.84 mg/dL; 10/20/21 microalbumin 53 mg/L    Patient Specific Goals:  1. Lower blood sugars  2. Lower A1C    Subjective   Ms. Florentino Blum is a 78 y.o. female here for the Diabetes Service for self-management education, medication review including over the counter medications and herbal products, overall wellbeing assessment, transition of care and any needed adjustments with updates and recommendations communicated to the referring physician. Patient's name and  verified. Patient visit in person in office. Patient Findings:    Patient was to see endocrinology on     Patient reports she has been doing better with her diet. Did go to grocery store yesterday and was able to get a lot of food. Still getting 5 senior meals per week. Patient thinks her stress level has been better controlled at times but still have periods of very high stress with various issues. Patient reports BP of 207/124 (7pm)  that she measured at home around gi () and reports she had a headache.  She took APAP but she did not call anyone other than her sons to inform them. Continued to monitor and remained elevated for a couple days. Educated patient on hypertensive urgency/emergency and  need to call provider or more importantly go to ED if that high of BP especially with any symptoms. This am 154/83 per patient's report via home blood pressure monitor. Reports it is higher right when wakes up and comes down to systolic in 153 and teens after she starts moving around. BP today is 153/63 HR 87. Encouraged patient to call PCP to discuss. Patient reports she is trying to take metformin XR 500mg twice a day but admits there are days she does not take it like yesterday when she was busy and just forgot and ate late. States she is tolerating much better. Does get sick to stomach at times but overall much better. Patient increased Trulicity to 6.5JP once a week on Wednesdays starting on 11/22. Compliant with insulin glargine 78 units once a a day with no missed doses. Next appt with endocrinology on 12/30/22 and 3/30/23. Patient was able to get rolling walker so this is making her very happy. She is using around the house and plans to use when she goes out also. Still working on reapplying for TARPs. Paperwork with PCP for signature/completion. Patient has not gotten blood work done as of yet but is fasting today. Patient will have lipids, CMP, A1c, microalbumin, vitamin D and CBC done today. Has enough of all diabetic medications and enough diabetic testing supplies. Patient has had no hypoglycemia since last visit. Discussed exercise. Patient still has not used her exercise bike but is available and cleaned off. Admits it does not take much to wear her out. Discussed that by exercising to her limits she will help develop stamina. Has goal of exercising for 15min three days a week (MWF). Patient very much against starting any mealtime insulin. Wants to start exercise.   Discussed that she is maximized on current medications or not able to take other alternative medications. Discussed that we could start with one meal (largest meal of the day) to start. Patient states this is what the endocrinologist said was likely needed as well. Patient very adamant she does not want to start and wants to get serious about exercise. Respect patient's wishes and will continue current dosing and monitor with further consideration based on if patient is able to start regular exercise. []  Missed doses   []  Emergency Room Visit    []  Medication changes  []  Hospitalization   [x]  Diet changes   []  Acute illness   []  Activity changes      Symptoms of hypoglycemia -     [x]  None   []  Shakiness    []  Lightheadedness or dizziness   []  Confusion      []  Sweating   []  Other       Symptoms of hyperglycemia   [x]  None   []  Frequent urination    []  Increased thirst   []  Other    Medication adverse reactions   [x]  None   []  Diarrhea / Nausea / Vomiting / Constipation / flatulence   []  Hypertension   []  Peripheral edema     []  Signs of infection   []  Headache   []  Vision changes   []  Increased cholesterol    []  Weight gain   []  Change in renal function   []  Increased potassium  []  Other  Comments:      If recent hospital admission / ED visit, was this related to Diabetes No:hypotension Discharge date July 2020    Objective     PMHx:    Past Medical History:   Diagnosis Date    Anxiety     Asthma     Common cold     COPD (chronic obstructive pulmonary disease) ([address]     Depression     Diabetes mellitus ([address]     Emphysema     Fatty liver     H/O: pneumonia FEB. 2014    Hiatal hernia     Hyperlipidemia     Hypertension     Mitral valve prolapse     Psychosis ([address]     SOB (shortness of breath) on exertion     Spinal headache     Stroke ([facility] 2013    Mild Lt side    Upper respiratory symptom        Social History:    Social History     Tobacco Use    Smoking status: Passive Smoke Exposure - Never Smoker Smokeless tobacco: Never    Tobacco comments:     socially smoked when playing cards   Substance Use Topics    Alcohol use: No     Alcohol/week: 0.0 standard drinks     Comment: socially       Pertinent Labs:    Lab Results   Component Value Date    LABA1C 9.3 10/31/2022    LABA1C 8.3 (H) 06/01/2022    LABA1C 8.3 (H) 02/07/2022     Lab Results   Component Value Date    CHOL 159 06/01/2022    TRIG 146 06/01/2022    HDL 49 06/01/2022    LDLCALC 59 02/12/2020     Lab Results   Component Value Date    CREATININE 0.84 06/01/2022    BUN 18 06/01/2022     06/01/2022    K 4.7 06/01/2022     06/01/2022    CO2 25 06/01/2022     CrCl calculated at 66ml/min      Lab Results   Component Value Date/Time    ALT 27 06/01/2022 11:24 AM       Weight:  Wt Readings from Last 3 Encounters:   12/14/22 211 lb 6.4 oz (95.9 kg)   11/16/22 211 lb 4.8 oz (95.8 kg)   10/19/22 209 lb (94.8 kg)       Current medications:  Prior to Admission medications    Medication Sig Start Date End Date Taking? Authorizing Provider   insulin glargine (LANTUS SOLOSTAR) 100 UNIT/ML injection pen Inject 76 Units into the skin nightly  Patient taking differently: Inject 78 Units into the skin nightly 10/5/22   Bruce Santana   Dulaglutide (TRULICITY) 3 BZ/8.1XB SOPN Inject 3 mg into the skin once a week on Wednesdays 10/5/22   Celio Yu   Insulin Pen Needle 32G X 4 MM MISC Use daily to inject insulin. 10/5/22   Celio Yu   metFORMIN (GLUCOPHAGE-XR) 500 MG extended release tablet Take 1 tablet by mouth 3 times daily (with meals)  Patient taking differently: Take 500 mg by mouth daily (with breakfast) 9/13/22   Celio Yu   blood glucose test strips (ACCU-CHEK DENISE PLUS) strip Test 3-4 times a day & as needed for symptoms of irregular blood glucose.  Dispense sufficient amount for indicated testing frequency plus additional to accommodate PRN testing needs 8/24/22   Bruce Pereira Santana   Lancets MISC Use to check blood sugar 3-4 times daily (before meals and at bedtime) and as needed for symptoms. 8/24/22   Mark Santana   pantoprazole (PROTONIX) 40 MG tablet Take 40 mg by mouth daily    Historical Provider, MD   metFORMIN (GLUCOPHAGE-XR) 500 MG extended release tablet Take 2 tablets by mouth 2 times daily (with meals)  Patient taking differently: Take 500 mg by mouth daily (with breakfast) 3/30/22   Emmer Corner   Magnesium Oxide 500 MG TABS Take 1 tablet by mouth daily    Historical Provider, MD   dorzolamide (TRUSOPT) 2 % ophthalmic solution Place 1 drop into both eyes 2 times daily     Historical Provider, MD   brimonidine (ALPHAGAN) 0.2 % ophthalmic solution Place 1 drop into both eyes 2 times daily  12/10/20   Historical Provider, MD   nitroGLYCERIN (NITROSTAT) 0.4 MG SL tablet Place 0.4 mg under the tongue every 5 minutes as needed for Chest pain up to max of 3 total doses. If no relief after 1 dose, call 911.     Historical Provider, MD   albuterol sulfate  (90 Base) MCG/ACT inhaler Inhale 2 puffs into the lungs every 6 hours as needed for Wheezing    Historical Provider, MD   tiotropium (SPIRIVA RESPIMAT) 2.5 MCG/ACT AERS inhaler Inhale 2 puffs into the lungs daily    Historical Provider, MD   escitalopram (LEXAPRO) 20 MG tablet Take 20 mg by mouth nightly    Historical Provider, MD   aspirin 81 MG chewable tablet Take 81 mg by mouth    Historical Provider, MD   metoprolol succinate (TOPROL XL) 50 MG extended release tablet Take 50 mg by mouth daily  1/12/18   Historical Provider, MD   budesonide-formoterol (SYMBICORT) 160-4.5 MCG/ACT AERO 2 puffs twice a day 5/1/18   Historical Provider, MD   Cholecalciferol (VITAMIN D3) 2000 units CAPS Take by mouth daily    Historical Provider, MD   losartan (COZAAR) 50 MG tablet Take 50 mg by mouth daily   Patient not taking: Reported on 8/24/2022 10/25/17   Historical Provider, MD   brexpiprazole (Kathrin Jesus) 0.25 MG TABS tablet Take 0.25 mg by mouth daily     Historical Provider, MD   rosuvastatin (CRESTOR) 40 MG tablet Take 40 mg by mouth every evening    Historical Provider, MD   montelukast (SINGULAIR) 10 MG tablet Take 10 mg by mouth nightly    Historical Provider, MD   loratadine (CLARITIN) 10 MG tablet Take 10 mg by mouth daily as needed (allergies)   Patient not taking: Reported on 6/29/2022    Historical Provider, MD   albuterol (PROVENTIL) (2.5 MG/3ML) 0.083% nebulizer solution Take 2.5 mg by nebulization every 6 hours as needed for Wheezing. Historical Provider, MD       Immunizations:   Most Recent Immunizations   Administered Date(s) Administered    Influenza Virus Vaccine 11/20/2015    Pneumococcal Conjugate 13-valent (Lourena Gloss) 11/20/2015       Home Blood Glucose Results:                   Blood sugars remain elevated. Discussed need to have compliance with diet to control blood sugars or look to meal time insulin. Patient indicates this is what endocrinology told her also. No hypoglycemia. Assessment     A1c at goal: No 9.3  Blood Pressure at goal: Yes  Weight at goal: No   Physical activity: not consistently. Physical activity at goal: no  Smoking Cessation: Yes  Cholesterol at target: LDL 81  6/1/22  Annual eye exam completed: Yes   Comprehensive Foot Exam Completed:  Yes  Annual urine albumin and serum creatinine: Yes    Statin: Yes    Appropriate?: Yes  Changes made:     ACE/ARB: Yes  Appropriate?: Yes  Changes made:     Aspirin: Yes  Appropriate?: Yes  Changes made:     Eating patterns:    []  My plate    []  Mediterranean diet   []  Low sodium   []  DASH diet   []  Portion control   []  Reduced calorie    []  Fast food / Restaurant  []  High glycemic index foods   []  Sugary beverages   [x]  Other     Comment: see above    Current Medications Affecting Diabetes:  Dulaglutide 4.5mg subQ once weekly on Wednesdays  Insulin glargine 78units subQ each evening  Metformin XR 500mg in the morning (not consistently). Compliant: Yes     Barriers to medication therapy: no    Medications attempted in the past:  Metformin about 3 years ago with some diarrhea. Unsure if regular or extended release. SGLT2 Rexsree Zavaleta) frequent yeast infections. Recent exacerbations / new problems:       Last office dictation reviewed: yes        type 2 DM under worsening control as evidenced by A1C of 9.3 (10/31/22)      Plan     1. Continue:   Dulaglutide (Trulicity)  to 7.9OP subQ once weekly on . Metformin XR 500mg with breakfast and dinner. Insulin glargine (Basaglar) at 78 Units subQ each evening. 2. Check blood sugars at least three times daily and document in sugar log. Patient to reece down whenever she eats something she feels is a treat or splurg. Pt to call the clinic with blood sugars <70 or >200    3. Have goal to start using her exercise bike. Goal to use three days a week. Physician Follow-up:  Every 3 months     Medication Management Follow-up:   Diabetes Service   5 weeks      Electronically signed by Matt Juan RPH on 2022 at [address] [place] [address] in place:  Yes  Recommendation Provided To: Patient/Caregiver: 3 via In person  Intervention Detail: Adherence Monitorin, Lab(s) Ordered and Refill(s) Provided  Intervention Accepted By: Patient/Caregiver: 3  Time Spent (min): 45      Satish Sanches RPH,Pharm. D,, BCPS, CACP  2022  1:42 PM

## 2022-12-15 LAB
ESTIMATED AVERAGE GLUCOSE: 237 MG/DL
HBA1C MFR BLD: 9.9 % (ref 4–6)

## 2023-01-18 ENCOUNTER — HOSPITAL ENCOUNTER (OUTPATIENT)
Dept: PHARMACY | Age: 68
Setting detail: THERAPIES SERIES
Discharge: HOME | End: 2023-01-18
Payer: COMMERCIAL

## 2023-01-18 VITALS
DIASTOLIC BLOOD PRESSURE: 56 MMHG | WEIGHT: 208.9 LBS | BODY MASS INDEX: 34.76 KG/M2 | SYSTOLIC BLOOD PRESSURE: 119 MMHG | HEART RATE: 72 BPM

## 2023-01-18 PROCEDURE — 99213 OFFICE O/P EST LOW 20 MIN: CPT

## 2023-01-18 NOTE — DISCHARGE INSTRUCTIONS
Try to take your metformin XR 500mg with breakfast and dinner. Continue insulin glargine at 78 units each evening. Continue Dulaglutide (Trulicity) 4.5 mg injection once a week on Wednesdays. Let me know if you do not get your refill of your Trulicity. Try to start using the exercise bike. Have a goal of 10-15 min three day a week and increase as you are able. Call with your next A1c after your appointment on 1/30/23.

## 2023-01-18 NOTE — PROGRESS NOTES
NAME: Natty [facility] RECORD NUMBER:  [id number]  AGE: 78 y.o. GENDER: female  : 1955  EPISODE DATE:  2023       Ms. Elke Wiseman was referred to [facility]ication Management Services by Dr. Madison Cruz, Special instructions include: follow up every 3 months with physician in office     Goals per referral:   Fasting blood glucose:   Peak postprandial glucose: < 180  A1C: < 7%    Other goals:  Blood pressure goal: 130/80    Weight loss goal (~10%): Target weight  200 to be reached by date: 2023 (3-6 months)  Physical Activity goal:    15 minutes 3 times per week to be reached by date: 2023 (3-6 months)  Smoking Cessation   Quit Date: never smoked  Cholesterol at target:Yes    Date: 2022  Annual eye exam:    Date:  22  Comprehensive annual foot exam:   Date: 6/3/22   Annual urine Albumin and serum creatinine:   Date:  22 SrCr  0.84 mg/dL; 22 microalbumin 55 mg/L    Patient Specific Goals:  1. Lower blood sugars  2. Lower A1C    Subjective   Ms. Elke Wiseman is a 78 y.o. female here for the Diabetes Service for self-management education, medication review including over the counter medications and herbal products, overall wellbeing assessment, transition of care and any needed adjustments with updates and recommendations communicated to the referring physician. Patient's name and  verified. Patient visit in person in office. Patient Findings:    Patient exercised once for 50 min but it wore her out and did not do any more. Discussed not overdoing it at one time. Discussed patient starting with 10-15 minutes and gradually increasing time as able. Goal to exercise several times week. Has been eating cabbage and has list of non-starchy vegetables. States she needs to go back to the grocery store as is now out of foods. States son just moved to new [place] so plans to ask him for ride next week.   Congratulated  patient for putting effort into diet and supported her continuing. Patient reports taking Metformin XR 500mg every day with breakfast and sometimes with dinner. Estimates she takes the second Metformin XR dose most days. Seems to be tolerating better. Notes less stomach upset. Acknowledges her stomach upset may be due to stress also. Taking Trulicity 7.5AM ever week with no missed doses on Wednesdays. Taking 78 units of insulin glargine. No missed doses but has been a bit late at times. On days she forgets and takes late (next morning) her fasting blood sugars are elevated. Patient needs refill on Trulicity 8.6CI. Did not get refill. [facility] contacted and they confirm they have been out for 2-3 weeks. They do have a waiting list and this patient is first on the list.  Unsure when they will get more in. Will consider changing agent or using lower dose if it does not come in, in next week. Patient to call our service if she does not receive refill in next week. Patient is to see endocrinology 1/30/23 will let us know what A1c is as they will likely due a POC A1c in office. Patient also due to endocrinology on 3/30/23. Got rolling walker and this has made patient feel much steadier. BP today is much improved from previous visits at 119/56. Patient appears less stressed overall. Patient had quite a bit of blood work completed on 12/14/22 with results scanned to media. Lipids, CMP, TSH and microalbumin done. Has enough of all diabetic medications and enough diabetic testing supplies other than Trulicity as discussed above. Patient has had no hypoglycemia since last visit. Patient up to date with foot and eye exams. Blood sugar logs reviewed as seen below. Blood sugar control shows significant improvement over last visit. Congratulated patient.         []  Missed doses   []  Emergency Room Visit    []  Medication changes  []  Hospitalization   [x]  Diet changes   []  Acute illness   []  Activity changes      Symptoms of hypoglycemia -     [x]  None   []  Shakiness    []  Lightheadedness or dizziness   []  Confusion      []  Sweating   []  Other       Symptoms of hyperglycemia   [x]  None   []  Frequent urination    []  Increased thirst   []  Other    Medication adverse reactions   [x]  None   []  Diarrhea / Nausea / Vomiting / Constipation / flatulence   []  Hypertension   []  Peripheral edema     []  Signs of infection   []  Headache   []  Vision changes   []  Increased cholesterol    []  Weight gain   []  Change in renal function   []  Increased potassium  []  Other  Comments:      If recent hospital admission / ED visit, was this related to Diabetes No:hypotension Discharge date July 2020    Objective     PMHx:    Past Medical History:   Diagnosis Date    Anxiety     Asthma     Common cold     COPD (chronic obstructive pulmonary disease) ([facility]     Depression     Diabetes mellitus ([facility]     Emphysema     Fatty liver     H/O: pneumonia FEB. 2014    Hiatal hernia     Hyperlipidemia     Hypertension     Mitral valve prolapse     Psychosis ([facility]     SOB (shortness of breath) on exertion     Spinal headache     Stroke ([facility] 2013    Mild Lt side    Upper respiratory symptom        Social History:    Social History     Tobacco Use    Smoking status: Passive Smoke Exposure - Never Smoker    Smokeless tobacco: Never    Tobacco comments:     socially smoked when playing cards   Substance Use Topics    Alcohol use: No     Alcohol/week: 0.0 standard drinks     Comment: socially       Pertinent Labs:    Lab Results   Component Value Date    LABA1C 9.9 (H) 12/14/2022    LABA1C 9.3 10/31/2022    LABA1C 8.3 (H) 06/01/2022     Lab Results   Component Value Date    CHOL 172 12/14/2022    TRIG 137 12/14/2022    HDL 47 12/14/2022    LDLCALC 59 02/12/2020     Lab Results   Component Value Date    CREATININE 0.82 12/14/2022    BUN 14 12/14/2022     12/14/2022    K 4.2 12/14/2022     12/14/2022    CO2 25 12/14/2022     CrCl calculated at 66ml/min      Lab Results   Component Value Date/Time    ALT 31 12/14/2022 02:34 PM       Weight:  Wt Readings from Last 3 Encounters:   01/18/23 208 lb 14.4 oz (94.8 kg)   12/14/22 211 lb 6.4 oz (95.9 kg)   11/16/22 211 lb 4.8 oz (95.8 kg)       Current medications:  Prior to Admission medications    Medication Sig Start Date End Date Taking? Authorizing Provider   insulin glargine (LANTUS SOLOSTAR) 100 UNIT/ML injection pen Inject 76 Units into the skin nightly  Patient taking differently: Inject 78 Units into the skin nightly 10/5/22   Kamaljit Santana   Dulaglutide (TRULICITY) 3 IQ/0.2GA SOPN Inject 3 mg into the skin once a week on Wednesdays 10/5/22   Qian Carlos   Insulin Pen Needle 32G X 4 MM MISC Use daily to inject insulin. 10/5/22   Qian Carlos   blood glucose test strips (ACCU-CHEK DENISE PLUS) strip Test 3-4 times a day & as needed for symptoms of irregular blood glucose. Dispense sufficient amount for indicated testing frequency plus additional to accommodate PRN testing needs 8/24/22   Qian Carlos   Lancets MISC Use to check blood sugar 3-4 times daily (before meals and at bedtime) and as needed for symptoms.  8/24/22   Kamaljit Santana   pantoprazole (PROTONIX) 40 MG tablet Take 40 mg by mouth daily    Historical Provider, MD   metFORMIN (GLUCOPHAGE-XR) 500 MG extended release tablet Take 2 tablets by mouth 2 times daily (with meals)  Patient taking differently: Take 500 mg by mouth 2 times daily (with meals) 3/30/22   Qian Carlos   Magnesium Oxide 500 MG TABS Take 1 tablet by mouth daily    Historical Provider, MD   dorzolamide (TRUSOPT) 2 % ophthalmic solution Place 1 drop into both eyes 2 times daily     Historical Provider, MD   brimonidine (ALPHAGAN) 0.2 % ophthalmic solution Place 1 drop into both eyes 2 times daily  12/10/20   Historical Provider, MD nitroGLYCERIN (NITROSTAT) 0.4 MG SL tablet Place 0.4 mg under the tongue every 5 minutes as needed for Chest pain up to max of 3 total doses. If no relief after 1 dose, call 911. Historical Provider, MD   albuterol sulfate  (90 Base) MCG/ACT inhaler Inhale 2 puffs into the lungs every 6 hours as needed for Wheezing    Historical Provider, MD   tiotropium (SPIRIVA RESPIMAT) 2.5 MCG/ACT AERS inhaler Inhale 2 puffs into the lungs daily    Historical Provider, MD   escitalopram (LEXAPRO) 20 MG tablet Take 20 mg by mouth nightly    Historical Provider, MD   aspirin 81 MG chewable tablet Take 81 mg by mouth    Historical Provider, MD   metoprolol succinate (TOPROL XL) 50 MG extended release tablet Take 50 mg by mouth daily  1/12/18   Historical Provider, MD   budesonide-formoterol (SYMBICORT) 160-4.5 MCG/ACT AERO 2 puffs twice a day 5/1/18   Historical Provider, MD   Cholecalciferol (VITAMIN D3) 2000 units CAPS Take by mouth daily    Historical Provider, MD   losartan (COZAAR) 50 MG tablet Take 50 mg by mouth daily   Patient not taking: Reported on 8/24/2022 10/25/17   Historical Provider, MD   brexpiprazole (REXULTI) 0.25 MG TABS tablet Take 0.25 mg by mouth daily     Historical Provider, MD   rosuvastatin (CRESTOR) 40 MG tablet Take 40 mg by mouth every evening    Historical Provider, MD   montelukast (SINGULAIR) 10 MG tablet Take 10 mg by mouth nightly    Historical Provider, MD   loratadine (CLARITIN) 10 MG tablet Take 10 mg by mouth daily as needed (allergies)   Patient not taking: Reported on 6/29/2022    Historical Provider, MD   albuterol (PROVENTIL) (2.5 MG/3ML) 0.083% nebulizer solution Take 2.5 mg by nebulization every 6 hours as needed for Wheezing.     Historical Provider, MD       Immunizations:   Most Recent Immunizations   Administered Date(s) Administered    Influenza Virus Vaccine 11/20/2015    Pneumococcal Conjugate 13-valent (Rosellen Sill) 11/20/2015       Home Blood Glucose Results: Blood sugars show significant improvement. Had discussed the possibility of adding mealtime insulin to patient's regimen at last visit but with current improvement will hold off on any changes. Assessment     A1c at goal: No 9.3  Blood Pressure at goal: Yes  Weight at goal: No   Physical activity: not consistently. Physical activity at goal: no  Smoking Cessation: Yes  Cholesterol at target: LDL 98  12/14/22  Annual eye exam completed: Yes   Comprehensive Foot Exam Completed: Yes  Annual urine albumin and serum creatinine: Yes    Statin: Yes    Appropriate?: Yes  Changes made:     ACE/ARB: Yes  Appropriate?: Yes  Changes made:     Aspirin: Yes  Appropriate?: Yes  Changes made:     Eating patterns:    []  My plate    []  Mediterranean diet   []  Low sodium   []  DASH diet   []  Portion control   []  Reduced calorie    []  Fast food / Restaurant  []  High glycemic index foods   []  Sugary beverages   [x]  Other     Comment: see above    Current Medications Affecting Diabetes:  Dulaglutide 4.5mg subQ once weekly on Wednesdays  Insulin glargine 78units subQ each evening  Metformin XR 500mg in the morning and most days with either lunch or dinner. Compliant: Yes     Barriers to medication therapy: no    Medications attempted in the past:  Metformin about 3 years ago with some diarrhea. Unsure if regular or extended release. SGLT2 Jag Precise) frequent yeast infections. Recent exacerbations / new problems:       Last office dictation reviewed: yes        type 2 DM under improving control as evidenced by blood glucose logs. Plan     1. Continue:   Dulaglutide (Trulicity)  to 0.7DJ subQ once weekly on Wednesdays. Metformin XR 500mg with breakfast and dinner. Insulin glargine (Basaglar) at 78 Units subQ each evening. 2. Check blood sugars at least three times daily and document in sugar log. Patient to reece down whenever she eats something she feels is a treat or splurg.   Pt to call the clinic with blood sugars <70 or >200    3. Have goal to start using her exercise bike. Goal to use three days a week. 4.  Patient to call with POC A1c result at 23 endocrinology visit. 5.  Patient to call if pharmacy not able to fill Trulicity 5.4IP prior to next Wednesday. Physician Follow-up:  Every 3 months     Medication Management Follow-up:   Diabetes Service   3 months. Patient will see endocrinology on 23 and 3/30/23 so will not see patient at same time. Patient to call with A1c on 23 and if decrease not seen will consider bringing patient in the end of February. Patient requests not coming in end of February due to having too many other appts and agreed as long as blood sugars continue improved.     Electronically signed by Harpal Husain RPH on 2023 at [address] Only    Program: Medication Management  CPA in place:  Yes  Recommendation Provided To: Patient/Caregiver: 2 via In person  Intervention Detail: Adherence Monitorin and Refill(s) Provided  Intervention Accepted By: Patient/Caregiver: 2  Time Spent (min): 45

## 2023-02-07 ENCOUNTER — HOSPITAL ENCOUNTER (OUTPATIENT)
Dept: WOMENS IMAGING | Age: 68
Discharge: HOME | End: 2023-02-09
Payer: COMMERCIAL

## 2023-02-07 DIAGNOSIS — Z12.31 ENCOUNTER FOR SCREENING MAMMOGRAM FOR MALIGNANT NEOPLASM OF BREAST: ICD-10-CM

## 2023-02-07 PROCEDURE — 77067 SCR MAMMO BI INCL CAD: CPT

## 2023-02-08 ENCOUNTER — TELEPHONE (OUTPATIENT)
Dept: PHARMACY | Age: 68
End: 2023-02-08

## 2023-02-09 NOTE — TELEPHONE ENCOUNTER
Patient called to indicate she is confused with regards to her glucometer / testing strips. Patient indicates she got a RX for new glucometer (One touch Ultra) from pharmacy. States she paid for the glucometer but did not have it when she got home. Called patient's pharmacy ([facility] [address]). They indicate they have the One Touch Ultra there for the patient. They also are able to switch the prescription they have on hand for glucose test strips to onetouch ultra so that they are able to be used with her glucometer. Called back patient who is happy and expresses thanks. Patient will  glucometer and glucose testing strips at the pharmacy tomorrow afternoon. Satish Sanches [facility],Pharm. D,, BCPS, CACP  2/8/2023  10:27 PM

## 2023-04-19 ENCOUNTER — HOSPITAL ENCOUNTER (OUTPATIENT)
Dept: PHARMACY | Age: 68
Setting detail: THERAPIES SERIES
Discharge: HOME | End: 2023-04-19
Payer: COMMERCIAL

## 2023-04-19 VITALS
OXYGEN SATURATION: 96 % | WEIGHT: 210 LBS | DIASTOLIC BLOOD PRESSURE: 72 MMHG | HEART RATE: 76 BPM | SYSTOLIC BLOOD PRESSURE: 145 MMHG | BODY MASS INDEX: 34.95 KG/M2

## 2023-04-19 PROCEDURE — 99213 OFFICE O/P EST LOW 20 MIN: CPT

## 2023-04-19 NOTE — DISCHARGE INSTRUCTIONS
Try to take your metformin XR 500mg with breakfast, lunch and dinner. Continue insulin glargine at 78 units each morning. Continue Dulaglutide (Trulicity) 4.5 mg injection once a week on Wednesdays. Let me know if you do not get your refill of your Trulicity. Be mindful of amount of carbs eating at lunch and dinner      Try to start using the exercise bike. Have a goal of 10-15 min three day a week and increase as you are able.       Call around to set up eye appointment - handout given    Try to find ways to cope with stress (meditation, walking, journal)    Call [phone] if having blood sugar readings  < 70 or > 200     Try your best to take nighttime medication

## 2023-04-19 NOTE — PROGRESS NOTES
Upper respiratory symptom        Social History:    Social History     Tobacco Use    Smoking status: Passive Smoke Exposure - Never Smoker    Smokeless tobacco: Never    Tobacco comments:     socially smoked when playing cards   Substance Use Topics    Alcohol use: No     Alcohol/week: 0.0 standard drinks     Comment: socially       Pertinent Labs:    Lab Results   Component Value Date    LABA1C 9.9 (H) 12/14/2022    LABA1C 9.3 10/31/2022    LABA1C 8.3 (H) 06/01/2022     Lab Results   Component Value Date    CHOL 172 12/14/2022    TRIG 137 12/14/2022    HDL 47 12/14/2022    LDLCALC 59 02/12/2020     Lab Results   Component Value Date    CREATININE 0.82 12/14/2022    BUN 14 12/14/2022     12/14/2022    K 4.2 12/14/2022     12/14/2022    CO2 25 12/14/2022     Lab Results   Component Value Date/Time    ALT 31 12/14/2022 02:34 PM       Weight:  Wt Readings from Last 3 Encounters:   01/18/23 208 lb 14.4 oz (94.8 kg)   12/14/22 211 lb 6.4 oz (95.9 kg)   11/16/22 211 lb 4.8 oz (95.8 kg)       Blood Pressure:  BP Readings from Last 3 Encounters:   01/18/23 (!) 119/56   11/16/22 (!) 150/82   10/05/22 (!) 134/53       Current medications:    Current Outpatient Medications:     insulin glargine (LANTUS SOLOSTAR) 100 UNIT/ML injection pen, Inject 76 Units into the skin nightly (Patient taking differently: Inject 78 Units into the skin nightly), Disp: 9 Adjustable Dose Pre-filled Pen Syringe, Rfl: 3    Dulaglutide (TRULICITY) 3 EP/6.1IY SOPN, Inject 3 mg into the skin once a week on Wednesdays (Patient taking differently: Inject 4.5 mg into the skin once a week on Wednesdays), Disp: 4 Adjustable Dose Pre-filled Pen Syringe, Rfl: 3    Insulin Pen Needle 32G X 4 MM MISC, Use daily to inject insulin., Disp: 100 each, Rfl: 3    blood glucose test strips (ACCU-CHEK DENISE PLUS) strip, Test 3-4 times a day & as needed for symptoms of irregular blood glucose.  Dispense sufficient amount for indicated testing frequency plus

## 2023-05-31 ENCOUNTER — HOSPITAL ENCOUNTER (OUTPATIENT)
Dept: PHARMACY | Age: 68
Setting detail: THERAPIES SERIES
Discharge: HOME | End: 2023-05-31
Payer: COMMERCIAL

## 2023-05-31 VITALS
WEIGHT: 212 LBS | OXYGEN SATURATION: 95 % | BODY MASS INDEX: 35.28 KG/M2 | SYSTOLIC BLOOD PRESSURE: 150 MMHG | DIASTOLIC BLOOD PRESSURE: 64 MMHG

## 2023-05-31 PROCEDURE — 99213 OFFICE O/P EST LOW 20 MIN: CPT

## 2023-05-31 NOTE — PROGRESS NOTES
a lot of protein. Adds in more vegetables. Not real consistent, but tries to eat every 4-5 hours. Exercise patterns:   Walking more now that she has her walker. Will try exercise bike     Blood Glucose Testing: Three times a day. Fasting and after lunch and dinner    Current Diabetic Medications:   Metformin 500 mg three times daily  Lantus 78 units each morning  Trulicity 4.5 mg once weekly     Diabetic Regimen/Compliance: Improved with switching the insulin to breakfast    Other Medication Compliance: Sometimes misses nighttime medications     Refills needed (diabetes medications/testing supplies). no    Labs needed (A1c, lipids, microalbumin, SCr etc) no    Up to date with eye/foot exams no: needs eye exam   Dr. Carley Siddiqui March 30, 2023. Patient is on appropriate statin, ACE/ARB, and ASA Yes      PLAN       Diabetic Action Plan is shown below. Patient and provider worked together to create goals which patient will work toward prior to the next appointment. Try to take your metformin XR 500mg with breakfast, lunch and dinner. Decrease insulin glargine at 76 units each morning. Continue Dulaglutide (Trulicity) 4.5 mg injection once a week on Wednesdays. Try to start using the exercise bike. Have a goal of 10-15 min three day a week and increase as you are able. 5. Please call [phone] if having blood sugar readings less than 70 or greater 200 with these medication changes.        Physician Follow-up:  As scheduled    Medication Management Follow-up:   Diabetes Service  6 weeks    Electronically signed by Wandy Lee [address] [place] [address] on 5/31/2023 at 10:38 AM    For Pharmacy Admin Tracking Only    Program: Medication Management  CPA in place:  Yes  Recommendation Provided To: Patient/Caregiver: 1 via In person  Intervention Detail: Dose Adjustment: 1, reason: Therapy Optimization  Intervention Accepted By: Patient/Caregiver: 1  Gap Closed?: Yes   Time Spent (min): [facility]

## 2023-05-31 NOTE — DISCHARGE INSTRUCTIONS
Try to take your metformin XR 500mg with breakfast, lunch and dinner. Decrease insulin glargine at 76 units each morning. Continue Dulaglutide (Trulicity) 4.5 mg injection once a week on Wednesdays. Try to start using the exercise bike. Have a goal of 10-15 min three day a week and increase as you are able.

## 2023-07-06 ENCOUNTER — TELEPHONE (OUTPATIENT)
Dept: PHARMACY | Age: 68
End: 2023-07-06

## 2023-07-06 NOTE — TELEPHONE ENCOUNTER
Dr. Toño Isabel office confirms he does not want patient taking part in diabetes medication management services any longer and will not sign CPA for renewal.      Patient informed we would need to cancel her appt for 7/10 and cancel her referral to our services. Encouraged patient to call with any questions. Sary called to cancel ride for patient on 7/10. Satish Sanches [facility],Pharm. D,, BCPS, CACP  7/6/2023  2:57 PM

## 2023-10-13 ENCOUNTER — HOSPITAL ENCOUNTER (OUTPATIENT)
Age: 68
Discharge: HOME | End: 2023-10-13
Payer: COMMERCIAL

## 2023-10-13 LAB
25(OH)D3 SERPL-MCNC: 61.3 NG/ML (ref 30–100)
ALBUMIN SERPL-MCNC: 4.4 G/DL (ref 3.5–5.2)
ALP SERPL-CCNC: 85 U/L (ref 35–104)
ALT SERPL-CCNC: 74 U/L (ref 5–33)
ANION GAP SERPL CALCULATED.3IONS-SCNC: 13 MMOL/L (ref 9–17)
AST SERPL-CCNC: 61 U/L
BACTERIA URNS QL MICRO: ABNORMAL
BASOPHILS # BLD: 0 K/UL (ref 0–0.2)
BASOPHILS NFR BLD: 1 % (ref 0–2)
BILIRUB SERPL-MCNC: 0.4 MG/DL (ref 0.3–1.2)
BILIRUB UR QL STRIP: NEGATIVE
BUN SERPL-MCNC: 13 MG/DL (ref 8–23)
CALCIUM SERPL-MCNC: 10.2 MG/DL (ref 8.6–10.4)
CASTS #/AREA URNS LPF: ABNORMAL /LPF
CHLORIDE SERPL-SCNC: 99 MMOL/L (ref 98–107)
CHOLEST SERPL-MCNC: 368 MG/DL
CHOLESTEROL/HDL RATIO: 7.1
CLARITY UR: CLEAR
CO2 SERPL-SCNC: 25 MMOL/L (ref 20–31)
COLOR UR: YELLOW
CREAT SERPL-MCNC: 0.7 MG/DL (ref 0.5–0.9)
CREAT UR-MCNC: 66.1 MG/DL (ref 28–217)
EOSINOPHIL # BLD: 0.4 K/UL (ref 0–0.4)
EOSINOPHILS RELATIVE PERCENT: 6 % (ref 0–4)
EPI CELLS #/AREA URNS HPF: ABNORMAL /HPF
ERYTHROCYTE [DISTWIDTH] IN BLOOD BY AUTOMATED COUNT: 17 % (ref 11.5–14.9)
GFR SERPL CREATININE-BSD FRML MDRD: >60 ML/MIN/1.73M2
GLUCOSE SERPL-MCNC: 164 MG/DL (ref 70–99)
GLUCOSE UR STRIP-MCNC: ABNORMAL MG/DL
HCT VFR BLD AUTO: 39.3 % (ref 36–46)
HDLC SERPL-MCNC: 52 MG/DL
HGB BLD-MCNC: 12.6 G/DL (ref 12–16)
HGB UR QL STRIP.AUTO: NEGATIVE
KETONES UR STRIP-MCNC: NEGATIVE MG/DL
LDLC SERPL CALC-MCNC: 267 MG/DL (ref 0–130)
LEUKOCYTE ESTERASE UR QL STRIP: ABNORMAL
LYMPHOCYTES NFR BLD: 1.9 K/UL (ref 1–4.8)
LYMPHOCYTES RELATIVE PERCENT: 28 % (ref 24–44)
MCH RBC QN AUTO: 25.8 PG (ref 26–34)
MCHC RBC AUTO-ENTMCNC: 31.9 G/DL (ref 31–37)
MCV RBC AUTO: 80.6 FL (ref 80–100)
MICROALBUMIN UR-MCNC: 105 MG/L
MICROALBUMIN/CREAT UR-RTO: 159 MCG/MG CREAT
MONOCYTES NFR BLD: 0.6 K/UL (ref 0.1–1.3)
MONOCYTES NFR BLD: 9 % (ref 1–7)
NEUTROPHILS NFR BLD: 56 % (ref 36–66)
NEUTS SEG NFR BLD: 3.7 K/UL (ref 1.3–9.1)
NITRITE UR QL STRIP: NEGATIVE
PH UR STRIP: 5 [PH] (ref 5–8)
PLATELET # BLD AUTO: 352 K/UL (ref 150–450)
PMV BLD AUTO: 7.9 FL (ref 6–12)
POTASSIUM SERPL-SCNC: 4.5 MMOL/L (ref 3.7–5.3)
PROT SERPL-MCNC: 7.7 G/DL (ref 6.4–8.3)
PROT UR STRIP-MCNC: ABNORMAL MG/DL
RBC # BLD AUTO: 4.88 M/UL (ref 4–5.2)
RBC #/AREA URNS HPF: ABNORMAL /HPF
SODIUM SERPL-SCNC: 137 MMOL/L (ref 135–144)
SP GR UR STRIP: 1.01 (ref 1–1.03)
T4 FREE SERPL-MCNC: 1.1 NG/DL (ref 0.9–1.7)
TRIGL SERPL-MCNC: 246 MG/DL
TSH SERPL DL<=0.05 MIU/L-ACNC: 0.91 UIU/ML (ref 0.3–5)
UROBILINOGEN UR STRIP-ACNC: NORMAL EU/DL (ref 0–1)
WBC #/AREA URNS HPF: ABNORMAL /HPF
WBC OTHER # BLD: 6.6 K/UL (ref 3.5–11)

## 2023-10-13 PROCEDURE — 87086 URINE CULTURE/COLONY COUNT: CPT

## 2023-10-13 PROCEDURE — 84439 ASSAY OF FREE THYROXINE: CPT

## 2023-10-13 PROCEDURE — 80053 COMPREHEN METABOLIC PANEL: CPT

## 2023-10-13 PROCEDURE — 80061 LIPID PANEL: CPT

## 2023-10-13 PROCEDURE — 84443 ASSAY THYROID STIM HORMONE: CPT

## 2023-10-13 PROCEDURE — 83036 HEMOGLOBIN GLYCOSYLATED A1C: CPT

## 2023-10-13 PROCEDURE — 85025 COMPLETE CBC W/AUTO DIFF WBC: CPT

## 2023-10-13 PROCEDURE — 82570 ASSAY OF URINE CREATININE: CPT

## 2023-10-13 PROCEDURE — 82306 VITAMIN D 25 HYDROXY: CPT

## 2023-10-13 PROCEDURE — 82043 UR ALBUMIN QUANTITATIVE: CPT

## 2023-10-13 PROCEDURE — 81001 URINALYSIS AUTO W/SCOPE: CPT

## 2023-10-13 PROCEDURE — 36415 COLL VENOUS BLD VENIPUNCTURE: CPT

## 2023-10-14 LAB
EST. AVERAGE GLUCOSE BLD GHB EST-MCNC: 220 MG/DL
HBA1C MFR BLD: 9.3 % (ref 4–6)
MICROORGANISM SPEC CULT: NORMAL
SPECIMEN DESCRIPTION: NORMAL

## 2024-02-08 ENCOUNTER — HOSPITAL ENCOUNTER (OUTPATIENT)
Age: 69
Discharge: HOME | End: 2024-02-08
Payer: COMMERCIAL

## 2024-02-08 LAB
CHOLEST SERPL-MCNC: 165 MG/DL
CHOLESTEROL/HDL RATIO: 3.3
HDLC SERPL-MCNC: 50 MG/DL
LDLC SERPL CALC-MCNC: 88 MG/DL (ref 0–130)
TRIGL SERPL-MCNC: 136 MG/DL

## 2024-02-08 PROCEDURE — 36415 COLL VENOUS BLD VENIPUNCTURE: CPT

## 2024-02-08 PROCEDURE — 80061 LIPID PANEL: CPT

## 2024-03-19 ENCOUNTER — TRANSCRIBE ORDERS (OUTPATIENT)
Dept: ADMINISTRATIVE | Age: 69
End: 2024-03-19

## 2024-03-19 DIAGNOSIS — Z12.31 SCREENING MAMMOGRAM, ENCOUNTER FOR: Primary | ICD-10-CM

## 2024-03-21 ENCOUNTER — HOSPITAL ENCOUNTER (OUTPATIENT)
Dept: WOMENS IMAGING | Age: 69
Discharge: HOME | End: 2024-03-23
Payer: COMMERCIAL

## 2024-03-21 VITALS — BODY MASS INDEX: 34.99 KG/M2 | WEIGHT: 210 LBS | HEIGHT: 65 IN

## 2024-03-21 DIAGNOSIS — Z12.31 SCREENING MAMMOGRAM, ENCOUNTER FOR: ICD-10-CM

## 2024-03-21 PROCEDURE — 77063 BREAST TOMOSYNTHESIS BI: CPT

## 2024-12-09 ENCOUNTER — TELEPHONE (OUTPATIENT)
Dept: GASTROENTEROLOGY | Age: 69
End: 2024-12-09

## 2024-12-09 NOTE — TELEPHONE ENCOUNTER
Previous patient of Dr Sharp's  is requesting to schedule a colonoscopy.    Please return her call at the earliest convenience.    Thank you.

## 2024-12-10 NOTE — TELEPHONE ENCOUNTER
TBS/ colonoscopy [place]  Office visit needed due to COPD (New return pt.)  Attempt 1- left voicemail  Attempt 2- sent letter

## 2025-01-28 ENCOUNTER — HOSPITAL ENCOUNTER (EMERGENCY)
Age: 70
Discharge: HOME | End: 2025-01-28
Attending: STUDENT IN AN ORGANIZED HEALTH CARE EDUCATION/TRAINING PROGRAM
Payer: COMMERCIAL

## 2025-01-28 ENCOUNTER — APPOINTMENT (OUTPATIENT)
Dept: GENERAL RADIOLOGY | Age: 70
End: 2025-01-28
Payer: COMMERCIAL

## 2025-01-28 VITALS
DIASTOLIC BLOOD PRESSURE: 81 MMHG | SYSTOLIC BLOOD PRESSURE: 150 MMHG | TEMPERATURE: 97.9 F | BODY MASS INDEX: 36.49 KG/M2 | WEIGHT: 219 LBS | HEIGHT: 65 IN | OXYGEN SATURATION: 99 % | RESPIRATION RATE: 13 BRPM | HEART RATE: 88 BPM

## 2025-01-28 DIAGNOSIS — R06.02 SHORTNESS OF BREATH: Primary | ICD-10-CM

## 2025-01-28 DIAGNOSIS — R06.09 DYSPNEA ON EXERTION: ICD-10-CM

## 2025-01-28 LAB
ANION GAP SERPL CALCULATED.3IONS-SCNC: 13 MMOL/L (ref 9–16)
BASOPHILS # BLD: 0.1 K/UL (ref 0–0.2)
BASOPHILS NFR BLD: 1 % (ref 0–2)
BUN SERPL-MCNC: 17 MG/DL (ref 8–23)
CALCIUM SERPL-MCNC: 9.6 MG/DL (ref 8.6–10.4)
CHLORIDE SERPL-SCNC: 106 MMOL/L (ref 98–107)
CO2 SERPL-SCNC: 22 MMOL/L (ref 20–31)
CREAT SERPL-MCNC: 1.1 MG/DL (ref 0.7–1.2)
D DIMER PPP FEU-MCNC: <0.27 UG/ML FEU (ref 0–0.59)
EOSINOPHIL # BLD: 0.2 K/UL (ref 0–0.4)
EOSINOPHILS RELATIVE PERCENT: 2 % (ref 0–4)
ERYTHROCYTE [DISTWIDTH] IN BLOOD BY AUTOMATED COUNT: 15 % (ref 11.5–14.9)
FLUAV RNA RESP QL NAA+PROBE: NOT DETECTED
FLUBV RNA RESP QL NAA+PROBE: NOT DETECTED
GFR, ESTIMATED: 55 ML/MIN/1.73M2
GLUCOSE SERPL-MCNC: 132 MG/DL (ref 74–99)
HCT VFR BLD AUTO: 40 % (ref 36–46)
HGB BLD-MCNC: 12.9 G/DL (ref 12–16)
LYMPHOCYTES NFR BLD: 2.7 K/UL (ref 1–4.8)
LYMPHOCYTES RELATIVE PERCENT: 28 % (ref 24–44)
MAGNESIUM SERPL-MCNC: 1.7 MG/DL (ref 1.6–2.4)
MCH RBC QN AUTO: 26.6 PG (ref 26–34)
MCHC RBC AUTO-ENTMCNC: 32.1 G/DL (ref 31–37)
MCV RBC AUTO: 82.7 FL (ref 80–100)
MONOCYTES NFR BLD: 0.7 K/UL (ref 0.1–1.3)
MONOCYTES NFR BLD: 7 % (ref 1–7)
NEUTROPHILS NFR BLD: 62 % (ref 36–66)
NEUTS SEG NFR BLD: 6.1 K/UL (ref 1.3–9.1)
PLATELET # BLD AUTO: 290 K/UL (ref 150–450)
PMV BLD AUTO: 8.2 FL (ref 6–12)
POTASSIUM SERPL-SCNC: 4.2 MMOL/L (ref 3.7–5.3)
RBC # BLD AUTO: 4.84 M/UL (ref 4–5.2)
SARS-COV-2 RNA RESP QL NAA+PROBE: NOT DETECTED
SODIUM SERPL-SCNC: 141 MMOL/L (ref 136–145)
SOURCE: NORMAL
SPECIMEN DESCRIPTION: NORMAL
TROPONIN I SERPL HS-MCNC: 10 NG/L (ref 0–14)
TROPONIN I SERPL HS-MCNC: 11 NG/L (ref 0–14)
WBC OTHER # BLD: 9.8 K/UL (ref 3.5–11)

## 2025-01-28 PROCEDURE — 85025 COMPLETE CBC W/AUTO DIFF WBC: CPT

## 2025-01-28 PROCEDURE — 99285 EMERGENCY DEPT VISIT HI MDM: CPT

## 2025-01-28 PROCEDURE — 93005 ELECTROCARDIOGRAM TRACING: CPT | Performed by: STUDENT IN AN ORGANIZED HEALTH CARE EDUCATION/TRAINING PROGRAM

## 2025-01-28 PROCEDURE — 36415 COLL VENOUS BLD VENIPUNCTURE: CPT

## 2025-01-28 PROCEDURE — 87636 SARSCOV2 & INF A&B AMP PRB: CPT

## 2025-01-28 PROCEDURE — 84484 ASSAY OF TROPONIN QUANT: CPT

## 2025-01-28 PROCEDURE — 71045 X-RAY EXAM CHEST 1 VIEW: CPT

## 2025-01-28 PROCEDURE — 80048 BASIC METABOLIC PNL TOTAL CA: CPT

## 2025-01-28 PROCEDURE — 6370000000 HC RX 637 (ALT 250 FOR IP): Performed by: STUDENT IN AN ORGANIZED HEALTH CARE EDUCATION/TRAINING PROGRAM

## 2025-01-28 PROCEDURE — 83735 ASSAY OF MAGNESIUM: CPT

## 2025-01-28 PROCEDURE — 85379 FIBRIN DEGRADATION QUANT: CPT

## 2025-01-28 RX ORDER — PREDNISONE 20 MG/1
40 TABLET ORAL ONCE
Status: COMPLETED | OUTPATIENT
Start: 2025-01-28 | End: 2025-01-28

## 2025-01-28 RX ORDER — PREDNISONE 20 MG/1
40 TABLET ORAL DAILY
Qty: 8 TABLET | Refills: 0 | Status: SHIPPED | OUTPATIENT
Start: 2025-01-29 | End: 2025-02-02

## 2025-01-28 RX ADMIN — PREDNISONE 40 MG: 20 TABLET ORAL at 16:09

## 2025-01-28 ASSESSMENT — ENCOUNTER SYMPTOMS
ABDOMINAL PAIN: 0
NAUSEA: 0
SHORTNESS OF BREATH: 1
VOMITING: 0

## 2025-01-28 ASSESSMENT — LIFESTYLE VARIABLES
HOW MANY STANDARD DRINKS CONTAINING ALCOHOL DO YOU HAVE ON A TYPICAL DAY: PATIENT DOES NOT DRINK
HOW OFTEN DO YOU HAVE A DRINK CONTAINING ALCOHOL: NEVER

## 2025-01-28 ASSESSMENT — PAIN - FUNCTIONAL ASSESSMENT: PAIN_FUNCTIONAL_ASSESSMENT: NONE - DENIES PAIN

## 2025-01-28 NOTE — ED PROVIDER NOTES
DISCHARGE MEDICATIONS:  Discharge Medication List as of 1/28/2025  4:03 PM        START taking these medications    Details   predniSONE (DELTASONE) 20 MG tablet Take 2 tablets by mouth daily for 4 days, Disp-8 tablet, R-0Normal             Humza Persaud DO  Emergency Medicine Attending    (Please note that portions of this note were completed with a voice recognition program.  Efforts were made to edit the dictations but occasionally words are mis-transcribed.)          Humza Persaud DO  01/29/25 0911

## 2025-01-28 NOTE — DISCHARGE INSTRUCTIONS
Please follow-up with your cardiologist, Dr. Buck or someone in his office  Please take the prednisone as prescribed  Please follow-up with your primary care provider  If you have any worsening of symptoms, return to the emergency department

## 2025-01-28 NOTE — ED TRIAGE NOTES
Mode of arrival (squad #, walk in, police, etc) : walk in        Chief complaint(s): SOB, cough, nasal congestion        Arrival Note (brief scenario, treatment PTA, etc).: Covid like symptoms x 3 weeks only gotten worse. Pt has a hx of asthma and COPD. Pt is A&OX4.        C= \"Have you ever felt that you should Cut down on your drinking?\"  No  A= \"Have people Annoyed you by criticizing your drinking?\"  No  G= \"Have you ever felt bad or Guilty about your drinking?\"  No  E= \"Have you ever had a drink as an Eye-opener first thing in the morning to steady your nerves or to help a hangover?\"  No      Deferred []      Reason for deferring: N/A    *If yes to two or more: probable alcohol abuse.*

## 2025-01-30 LAB
EKG ATRIAL RATE: 112 BPM
EKG P AXIS: 69 DEGREES
EKG P-R INTERVAL: 196 MS
EKG Q-T INTERVAL: 328 MS
EKG QRS DURATION: 76 MS
EKG QTC CALCULATION (BAZETT): 447 MS
EKG R AXIS: -13 DEGREES
EKG T AXIS: 55 DEGREES
EKG VENTRICULAR RATE: 112 BPM

## 2025-01-30 PROCEDURE — 93010 ELECTROCARDIOGRAM REPORT: CPT | Performed by: INTERNAL MEDICINE

## 2025-02-18 ENCOUNTER — APPOINTMENT (OUTPATIENT)
Dept: CT IMAGING | Age: 70
End: 2025-02-18
Payer: COMMERCIAL

## 2025-02-18 ENCOUNTER — HOSPITAL ENCOUNTER (EMERGENCY)
Age: 70
Discharge: HOME | End: 2025-02-18
Attending: EMERGENCY MEDICINE
Payer: COMMERCIAL

## 2025-02-18 VITALS
DIASTOLIC BLOOD PRESSURE: 69 MMHG | OXYGEN SATURATION: 96 % | TEMPERATURE: 97.9 F | SYSTOLIC BLOOD PRESSURE: 135 MMHG | RESPIRATION RATE: 18 BRPM | WEIGHT: 211 LBS | BODY MASS INDEX: 35.16 KG/M2 | HEART RATE: 112 BPM | HEIGHT: 65 IN

## 2025-02-18 DIAGNOSIS — S09.90XA CLOSED HEAD INJURY, INITIAL ENCOUNTER: Primary | ICD-10-CM

## 2025-02-18 PROCEDURE — 99284 EMERGENCY DEPT VISIT MOD MDM: CPT

## 2025-02-18 PROCEDURE — 70450 CT HEAD/BRAIN W/O DYE: CPT

## 2025-02-18 ASSESSMENT — ENCOUNTER SYMPTOMS
FACIAL SWELLING: 0
EYE DISCHARGE: 0
WHEEZING: 0
RHINORRHEA: 0
EYE PAIN: 0
SHORTNESS OF BREATH: 0
NAUSEA: 0
VOMITING: 0
SINUS PRESSURE: 0
BACK PAIN: 0
CHEST TIGHTNESS: 0
DIARRHEA: 0
COUGH: 0
ABDOMINAL PAIN: 0
COLOR CHANGE: 0
EYE REDNESS: 0
CONSTIPATION: 0
BLOOD IN STOOL: 0
SORE THROAT: 0
TROUBLE SWALLOWING: 0

## 2025-02-18 NOTE — ED PROVIDER NOTES
eMERGENCY dEPARTMENT eNCOUnter      Pt Name: Chey Colmenares  MRN: [id number]  Birthdate 1955  Date of evaluation: 2/18/25      CHIEF COMPLAINT       Chief Complaint   Patient presents with    Head Injury     Slipped on ice last Wed. Hit back of head on curb.  Takes 81mg of asa    Back Pain     Thinks she pulled it shoveling snow         HISTORY OF PRESENT ILLNESS    Chey Colmenares is a 69 y.o. female who presents complaining of head injury.  Patient states that about 6 days ago she slipped on some ice smacked the back of her head on the ground and was kind of dazed a little bit but did not pass out or get knocked out.  Patient states she was eventually able to get up.  Patient had a little bit blurry vision since then but otherwise walking with her walker as normal.  Patient has no dizziness no nausea or vomiting no numbness tingling or weakness.  Patient states she does have a little bit of lower back pain after she was doing some shoveling but she does not think it was related to the injury.  Patient has a history of chronic back pain.  Patient takes aspirin but no other blood thinners.  Patient is just concerned because she still has a big goose egg on her head and it is tender around it.      REVIEW OF SYSTEMS       Review of Systems   Constitutional:  Negative for activity change, appetite change, chills, diaphoresis and fever.   HENT:  Negative for congestion, ear pain, facial swelling, nosebleeds, rhinorrhea, sinus pressure, sore throat and trouble swallowing.    Eyes:  Negative for pain, discharge and redness.   Respiratory:  Negative for cough, chest tightness, shortness of breath and wheezing.    Cardiovascular:  Negative for chest pain, palpitations and leg swelling.   Gastrointestinal:  Negative for abdominal pain, blood in stool, constipation, diarrhea, nausea and vomiting.   Genitourinary:  Negative for difficulty urinating, dysuria, flank pain, frequency, genital sores and hematuria.

## 2025-03-05 ENCOUNTER — OFFICE VISIT (OUTPATIENT)
Dept: ORTHOPEDIC SURGERY | Age: 70
End: 2025-03-05

## 2025-03-05 VITALS — HEIGHT: 65 IN | RESPIRATION RATE: 14 BRPM | BODY MASS INDEX: 35.56 KG/M2 | WEIGHT: 213.4 LBS

## 2025-03-05 DIAGNOSIS — M25.551 LATERAL PAIN OF RIGHT HIP: Primary | ICD-10-CM

## 2025-03-05 RX ORDER — BUPIVACAINE HYDROCHLORIDE 2.5 MG/ML
2 INJECTION, SOLUTION INFILTRATION; PERINEURAL ONCE
Status: COMPLETED | OUTPATIENT
Start: 2025-03-05 | End: 2025-03-05

## 2025-03-05 RX ORDER — INSULIN ASPART 100 [IU]/ML
10 INJECTION, SOLUTION INTRAVENOUS; SUBCUTANEOUS 3 TIMES DAILY
COMMUNITY

## 2025-03-05 RX ORDER — BETAMETHASONE SODIUM PHOSPHATE AND BETAMETHASONE ACETATE 3; 3 MG/ML; MG/ML
12 INJECTION, SUSPENSION INTRA-ARTICULAR; INTRALESIONAL; INTRAMUSCULAR; SOFT TISSUE ONCE
Status: COMPLETED | OUTPATIENT
Start: 2025-03-05 | End: 2025-03-05

## 2025-03-05 RX ADMIN — BETAMETHASONE SODIUM PHOSPHATE AND BETAMETHASONE ACETATE 12 MG: 3; 3 INJECTION, SUSPENSION INTRA-ARTICULAR; INTRALESIONAL; INTRAMUSCULAR; SOFT TISSUE at 10:05

## 2025-03-05 RX ADMIN — BUPIVACAINE HYDROCHLORIDE 5 MG: 2.5 INJECTION, SOLUTION INFILTRATION; PERINEURAL at 10:06

## 2025-03-05 ASSESSMENT — ENCOUNTER SYMPTOMS
VOMITING: 0
COUGH: 0
COLOR CHANGE: 0
SHORTNESS OF BREATH: 0

## 2025-03-05 NOTE — PROGRESS NOTES
recording.      This note is created with the assistance of a speech recognition program.  While intending to generate a document that actually reflects the content of the visit, the document can still have some errors including those of syntax and sound a like substitutions which may escape proof reading.  In such instances, actual meaning can be extrapolated by contextual diversion.     Electronically signed by Tierney Orlando PA-C on 3/5/2025 at 9:20 AM

## 2025-04-14 ENCOUNTER — OFFICE VISIT (OUTPATIENT)
Dept: ORTHOPEDIC SURGERY | Age: 70
End: 2025-04-14
Payer: COMMERCIAL

## 2025-04-14 VITALS — BODY MASS INDEX: 35.65 KG/M2 | RESPIRATION RATE: 14 BRPM | HEIGHT: 65 IN | WEIGHT: 214 LBS

## 2025-04-14 VITALS — HEIGHT: 65 IN | RESPIRATION RATE: 14 BRPM | WEIGHT: 214.8 LBS | BODY MASS INDEX: 35.79 KG/M2

## 2025-04-14 DIAGNOSIS — M25.551 LATERAL PAIN OF RIGHT HIP: ICD-10-CM

## 2025-04-14 DIAGNOSIS — M75.112 INCOMPLETE TEAR OF LEFT ROTATOR CUFF, UNSPECIFIED WHETHER TRAUMATIC: Primary | ICD-10-CM

## 2025-04-14 DIAGNOSIS — M25.551 LATERAL PAIN OF RIGHT HIP: Primary | ICD-10-CM

## 2025-04-14 PROCEDURE — 1159F MED LIST DOCD IN RCRD: CPT

## 2025-04-14 PROCEDURE — G8399 PT W/DXA RESULTS DOCUMENT: HCPCS | Performed by: PHYSICIAN ASSISTANT

## 2025-04-14 PROCEDURE — 1090F PRES/ABSN URINE INCON ASSESS: CPT

## 2025-04-14 PROCEDURE — 1123F ACP DISCUSS/DSCN MKR DOCD: CPT

## 2025-04-14 PROCEDURE — G8417 CALC BMI ABV UP PARAM F/U: HCPCS | Performed by: PHYSICIAN ASSISTANT

## 2025-04-14 PROCEDURE — 3017F COLORECTAL CA SCREEN DOC REV: CPT

## 2025-04-14 PROCEDURE — G8417 CALC BMI ABV UP PARAM F/U: HCPCS

## 2025-04-14 PROCEDURE — 1036F TOBACCO NON-USER: CPT

## 2025-04-14 PROCEDURE — 3017F COLORECTAL CA SCREEN DOC REV: CPT | Performed by: PHYSICIAN ASSISTANT

## 2025-04-14 PROCEDURE — G8428 CUR MEDS NOT DOCUMENT: HCPCS | Performed by: PHYSICIAN ASSISTANT

## 2025-04-14 PROCEDURE — 1123F ACP DISCUSS/DSCN MKR DOCD: CPT | Performed by: PHYSICIAN ASSISTANT

## 2025-04-14 PROCEDURE — 1036F TOBACCO NON-USER: CPT | Performed by: PHYSICIAN ASSISTANT

## 2025-04-14 PROCEDURE — 99214 OFFICE O/P EST MOD 30 MIN: CPT

## 2025-04-14 PROCEDURE — 1090F PRES/ABSN URINE INCON ASSESS: CPT | Performed by: PHYSICIAN ASSISTANT

## 2025-04-14 PROCEDURE — G8427 DOCREV CUR MEDS BY ELIG CLIN: HCPCS

## 2025-04-14 PROCEDURE — 99213 OFFICE O/P EST LOW 20 MIN: CPT | Performed by: PHYSICIAN ASSISTANT

## 2025-04-14 PROCEDURE — G8399 PT W/DXA RESULTS DOCUMENT: HCPCS

## 2025-04-14 PROCEDURE — 1126F AMNT PAIN NOTED NONE PRSNT: CPT | Performed by: PHYSICIAN ASSISTANT

## 2025-04-14 RX ORDER — DICLOFENAC SODIUM 75 MG/1
75 TABLET, DELAYED RELEASE ORAL 2 TIMES DAILY WITH MEALS
Qty: 28 TABLET | Refills: 0 | Status: SHIPPED | OUTPATIENT
Start: 2025-04-14 | End: 2025-04-28

## 2025-04-14 ASSESSMENT — ENCOUNTER SYMPTOMS
VOMITING: 0
SHORTNESS OF BREATH: 0
COUGH: 0
COLOR CHANGE: 0

## 2025-04-14 NOTE — PROGRESS NOTES
Orthopedic Shoulder Encounter Note - New Patient    Chief complaint: Left shoulder pain    HPI: Chey Colmenares is a 69 y.o.  right-hand dominant female who presents for evaluation of her left shoulder.  Patient states he has been dealing with pain into the shoulder for approximate last 2 months.  She states that she did have a fall in February where she fell back hitting her head onto a curb but also states that she believes she hurt her shoulder at that time does not want it started to bother her again.  She does have significant history of a rotator cuff repair by Dr. Carballo in 2014 and states that the shoulder was doing well until recently.  She does state weakness as well as snapping/popping in the shoulder she feels like the shoulder \"pops out of the joint\".  She states that no specific activity exacerbates her pain but does state that it happens intermittently and catches her off guard when the pain does occur.  Endorses it mostly over the lateral aspect of her shoulder.  Denies any numbness or tingling.    Previous treatment:    NSAIDs: None    Physical Therapy: None    Injections: No    Surgeries: Left rotator cuff repair by Dr. Alvarez in 2014    Review of Systems:   Constitutional: Negative for fever, chills, sweats.   Pain level: 10/10  Neurological: Negative for headache, numbness, or weakness.   Musculoskeletal: As noted in HPI     Past Medical History  Chey  has a past medical history of Anxiety, Asthma, Common cold, COPD (chronic obstructive pulmonary disease) ([facility]), Depression, Diabetes mellitus (HCC), Emphysema, Fatty liver, H/O: pneumonia, Hiatal hernia, Hyperlipidemia, Hypertension, Mitral valve prolapse, Psychosis (HCC), SOB (shortness of breath) on exertion, Spinal headache, Stroke ([facility]), and Upper respiratory symptom.    Past Surgical History  Chey  has a past surgical history that includes Hysterectomy; Carpal tunnel release (Right); Shoulder arthroscopy (Left, 05/19/2014); Colonoscopy

## 2025-04-14 NOTE — PROGRESS NOTES
[facility]  [address] [place], [place] [id number]  Dept: [phone]  Dept Fax: [phone]        Ambulatory Follow Up      Subjective:   Chey Colmenares is a 69 y.o. year old female who presents to our office today for routine followup regarding her   1. Lateral pain of right hip    .    Chief Complaint   Patient presents with    Hip Pain     FU: Right hip pain, since injection she denies any groin pain. States it gave her 100% relief, continues to work. Still get twinges of pain off and on since then.       History of Present Illness  The patient is a 69-year-old female here today for follow-up regarding right lateral hip pain. She received a right greater trochanteric bursitis injection on 03/05/2025, which provided 100 percent relief. She no longer experiences groin pain but occasionally feels a twinge of discomfort. Otherwise, she is doing fairly well.    Significant improvement in her hip condition is reported following the injection, with no residual pain on the side of the hip.        Review of Systems   Constitutional:  Negative for activity change and fever.   HENT:  Negative for sneezing.    Respiratory:  Negative for cough and shortness of breath.    Cardiovascular:  Negative for chest pain.   Gastrointestinal:  Negative for vomiting.   Musculoskeletal:  Negative for arthralgias, joint swelling and myalgias.   Skin:  Negative for color change.   Neurological:  Negative for weakness and numbness.   Psychiatric/Behavioral:  Negative for sleep disturbance.        Objective :   Resp 14   Ht 1.651 m (5' 5\")   Wt 97.4 kg (214 lb 12.8 oz)   BMI 35.74 kg/m²  Body mass index is 35.74 kg/m².  General: Chey Colmenares is a 69 y.o. female who is alert and oriented and sitting comfortably in our office.  Ortho Exam    MSK: Patient ambulates with a rolling walker.  No antalgia noted with ambulation.  No tenderness no with

## 2025-04-22 ENCOUNTER — HOSPITAL ENCOUNTER (OUTPATIENT)
Dept: PHYSICAL THERAPY | Age: 70
Setting detail: THERAPIES SERIES
Discharge: HOME | End: 2025-04-22
Payer: COMMERCIAL

## 2025-04-22 PROCEDURE — 97161 PT EVAL LOW COMPLEX 20 MIN: CPT

## 2025-04-22 NOTE — CONSULTS
Manual Scapular stabilization + []         - []           NT []  + []         - []           NT []    Impingement     Vincent + [x]         - []           NT []  + [x]         - []           NT []    Neer + []         - []           NT []  + []         - []           NT []    Dieudonne + [x]         - []           NT []  + []         - [x]           NT []    Scapular Assistance Test (SAT) + []         - []           NT []  + []         - []           NT []    IR Resisted Strength Test + []         - []           NT []  + []         - []           NT []    Rotator Cuff     Patte (Hornblower's sign) + []         - [x]           NT []  + []         - [x]           NT []    IR Lag sign (Lift-Off Test) + []         - [x]           NT []  + []         - [x]           NT []    ER Lag sign + []         - []           NT []  + []         - []           NT []    Belly Press + []         - []           NT []  + []         - []           NT []    Dropping Sign + []         - []           NT []  + []         - []           NT []    Drop Arm + []         - [x]           NT []  + []         - [x]           NT []    Empty Can + []         - [x]           NT []  + []         - [x]           NT []    Full Can + []         - []           NT []  + []         - []           NT []    Labral Tear     Speed's  + [x]         - []           NT []  + []         - [x]           NT []    Crank + []         - []           NT []  + []         - []           NT []    Clunk + []         - []           NT []  + []         - []           NT []    Lilia Test + []         - []           NT []  + []         - []           NT []    Reeves + []         - []           NT []  + []         - []           NT []    Biceps Load + []         - []           NT []  + []         - []           NT []    Anterior Slide + []         - []           NT []  + []         - []           NT []    Pronated Load + []         - []           NT []  + []         - []

## 2025-04-25 ENCOUNTER — HOSPITAL ENCOUNTER (OUTPATIENT)
Dept: WOMENS IMAGING | Age: 70
Discharge: HOME | End: 2025-04-27
Payer: COMMERCIAL

## 2025-04-25 DIAGNOSIS — Z12.31 ENCOUNTER FOR SCREENING MAMMOGRAM FOR MALIGNANT NEOPLASM OF BREAST: ICD-10-CM

## 2025-04-25 PROCEDURE — 77063 BREAST TOMOSYNTHESIS BI: CPT

## 2025-04-29 ENCOUNTER — HOSPITAL ENCOUNTER (OUTPATIENT)
Dept: PHYSICAL THERAPY | Age: 70
Setting detail: THERAPIES SERIES
Discharge: HOME | End: 2025-04-29
Payer: COMMERCIAL

## 2025-04-29 PROCEDURE — 97110 THERAPEUTIC EXERCISES: CPT

## 2025-04-29 NOTE — FLOWSHEET NOTE
previously given.     Home Exercise Program:  Access Code: [email or web address]  URL: [email or web address]  Date: 04/29/2025  Prepared by: Nikki Moore    Exercises  - Supine Shoulder Flexion Extension AAROM with Dowel  - 2 x daily - 7 x weekly - 2 sets - 10 reps  - Supine Shoulder Abduction AAROM with Dowel  - 2 x daily - 7 x weekly - 2 sets - 10 reps  - Supine Shoulder External Rotation with Dowel  - 2 x daily - 7 x weekly - 2 sets - 10 reps  - Seated Shoulder Rolls  - 1 x daily - 7 x weekly - 2 sets - 10 reps  - Seated Scapular Retraction  - 1 x daily - 7 x weekly - 2 sets - 10 reps  - Seated Shoulder Flexion Towel Slide at Table Top  - 1 x daily - 7 x weekly - 2 sets - 10 reps  - Seated Shoulder Scaption Slide at Table Top with Forearm in Neutral  - 1 x daily - 7 x weekly - 2 sets - 10 reps  - Seated Shoulder Abduction Towel Slide at Table Top  - 1 x daily - 7 x weekly - 2 sets - 10 reps      Plan for next visit: continue with HEP charted above, add TB rows and extensions      Assessment:   The patient completed today's session without c/o increased L shoulder pain. Reviewed HEP and patient required tactile and verbal cueing to complete shoulder abduction AAROM with dowel samina with correct form. Today was the patient's first treatment session after the evaluation. Added table slides to exercise program and for HEP in order to address AAROM of L shoulder. Initiated scapular strengthening with seated scapular retractions. Ended the session on the pulley's to further work on L shoulder ROM. The patient reported increased soreness towards the end of the session.     [x] Progressing toward goals.  [] No change.  [] Other:  [x] Patient would continue to benefit from skilled physical therapy services in order to: improve L shoulder ROM, strength and pain to ease her ability to complete functional tasks.     GOALS:   STG: (to be met in 12 treatments)  Pt will self report worst pain no greater than 4/10 in order to better

## 2025-05-06 ENCOUNTER — HOSPITAL ENCOUNTER (OUTPATIENT)
Dept: PHYSICAL THERAPY | Age: 70
Setting detail: THERAPIES SERIES
Discharge: HOME | End: 2025-05-06
Payer: COMMERCIAL

## 2025-05-06 PROCEDURE — 97110 THERAPEUTIC EXERCISES: CPT

## 2025-05-06 NOTE — FLOWSHEET NOTE
[facility]   [address]   Phone: [phone]   Fax: [phone]    Physical Therapy Daily Treatment Note      Date:  2025  Patient Name:  Chey Colmenares    :  1955  MRN: [id number]  Physician:  Jose Thomason PA-C      Insurance: [facility] (Approval Valid  -  12 Visits)  Diagnosis: M75.112 (ICD-10-CM) - Incomplete tear of left rotator cuff, unspecified whether traumatic    Onset Date:  2025    Next  Appt: 2025  Visit# / total visits: 3/24 ( auth)  Cancels/No Shows: 0/0    Precautions: Left rotator cuff repair by Dr. Alvarez in       Subjective:    5/6 Patient states she doesn't have pain upon arrival. States certain movements hurt her shoulder like reaching over head. States she doesn't usually have pain at rest.     Pain:  [] Yes  [x] No   Location: L shoulder    Pain Rating (0-10 scale): 0/10 at rest  Pain altered Tx:  [x] No  [] Yes  Action:    Objective:  INTERVENTIONS  INTERVENTIONS  Reps/ Time Weight/ Level Completed  Today Comments          Therapeutic Exercise       Pulley's (flexion) 2 min  x    Supine shoulder AAROM with dowel samina (flexion, scaption, ER) 20x each  x    Supine chest press with dowel samina 20x  x    Supine hands clasped 20x  x    Table slides (flexion, scaption, abduction) 20x each  x           Side lying        R ER w/towel roll  10x2  x Added 5/6   R shoulder Abduction  10x2  x Added 5/6   R shoulder flexion 10x2  x Added 5/6   R shoulder extension  10x2  x Added 5/6                  Seated        Scapular retractions 20x  x    Shoulder shrugs 20x   x    Shoulder rolls 20x fwd  20x bwd  x    3-way bicep curls 15x each 1# x    (B) shoulder hor. Abd  10x2 Yellow  x Added 5/6   (B) shoulder ER 10x2 Yellow  x Added 5/6                             Patient Education/Home Program:   Patient Education:  [x] Yes  [] No  [x] Reviewed Prior HEP/Ed  Method of Education: [x] Verbal  [x] Demo

## 2025-05-09 ENCOUNTER — HOSPITAL ENCOUNTER (OUTPATIENT)
Dept: PHYSICAL THERAPY | Age: 70
Setting detail: THERAPIES SERIES
Discharge: HOME | End: 2025-05-09
Payer: COMMERCIAL

## 2025-05-09 PROCEDURE — 97110 THERAPEUTIC EXERCISES: CPT

## 2025-05-09 NOTE — FLOWSHEET NOTE
[facility]   [address]   Phone: [phone]   Fax: [phone]    Physical Therapy Daily Treatment Note      Date:  2025  Patient Name:  Chey Colmenares    :  1955  MRN: [id number]  Physician:  Jose Thomason PA-C      Insurance: [facility] (Approval Valid  -  12 Visits)  Diagnosis: M75.112 (ICD-10-CM) - Incomplete tear of left rotator cuff, unspecified whether traumatic    Onset Date:  2025    Next  Appt: 2025  Visit# / total visits:  (3/12 auth)  Cancels/No Shows: 0/0    Precautions: Left rotator cuff repair by Dr. Alvarez in       Subjective:    The patient reports that she is fatigued from going to the store prior to her PT session. Patient reports compliance with HEP.     Pain:  [] Yes  [x] No   Location: L shoulder    Pain Rating (0-10 scale): 3/10   Pain altered Tx:  [x] No  [] Yes  Action:    Objective:  INTERVENTIONS  INTERVENTIONS  Reps/ Time Weight/ Level Completed  Today Comments          Therapeutic Exercise       Pulley's (flexion) 2 min  x    Supine shoulder AAROM with dowel samina (flexion, scaption, ER) 20x each  x    Supine chest press with dowel samina 20x 2#  x Added weight 2025   Supine hands clasped 20x  x    Table slides (flexion, scaption, abduction) 20x each  x           Side lying        R ER w/towel roll  10x2  x Added 5/6   R shoulder Abduction  10x2  x Added 5/6   R shoulder flexion 10x2  x Added 5/6   R shoulder extension  10x2  x Added 5/6                  Seated        Scapular retractions 20x  x    Shoulder shrugs 20x   x    Shoulder rolls 20x fwd  20x bwd  x    3-way bicep curls 15x each 1# x    (B) shoulder hor. Abd  10x2 Yellow  x Added 5/6   (B) shoulder ER 10x2 Yellow  x Added 5/6                             Patient Education/Home Program:   Patient Education:  [x] Yes  [] No  [x] Reviewed Prior HEP/Ed  Method of Education: [x] Verbal  [x] Demo  [] Written  Comprehension

## 2025-05-14 ENCOUNTER — HOSPITAL ENCOUNTER (OUTPATIENT)
Dept: PHYSICAL THERAPY | Age: 70
Setting detail: THERAPIES SERIES
Discharge: HOME | End: 2025-05-14
Payer: COMMERCIAL

## 2025-05-14 PROCEDURE — 97110 THERAPEUTIC EXERCISES: CPT

## 2025-05-14 NOTE — FLOWSHEET NOTE
of exercises. Progressed side lying strengthening with addition of 1# weight and progressed scapular strengthening with resisted rows/extensions. The patient asked to end the session early due to increased fatigue. Updated HEP and issued handout as charted above. The patient report no increase in pain reports at the end of the session.     [x] Progressing toward goals.  [] No change.  [] Other:  [x] Patient would continue to benefit from skilled physical therapy services in order to: improve L shoulder ROM, strength and pain to ease her ability to complete functional tasks.     GOALS:   STG: (to be met in 12 treatments)  Pt will self report worst pain no greater than 4/10 in order to better tolerate ADLs/work activities with minimal dysfunction  Pt will improve AROM in L shoulder to WFL in order to demonstrate ability to move/reach in all planes unrestricted at PLOF  LTG: (to be met in 24 treatments)  Pt will demonstrate improved L UE strength to 4+/5 or greater in order to demonstrate improved stability/strength necessary for unrestricted ADLs/work activities  Pt will decrease score on SPADI from 41% functionally impaired to less than 20% functionally impaired in order to demonstrate improved functional tolerances at PLOF with minimal restriction/dysfunction  Pt will demonstrate independence with a long term HEP for continued progress/maintenance after completion of PT       Plan: [x] Continue per plan of care.   [] Other:      Treatment Charges: Mins Units Time In/Time out    []  Modalities      [x]  Ther Exercise 38 3    []  Manual Therapy      []  Ther Activities      []  Aquatics      []  Neuromuscular      [] Vasocompression      [] Gait Training      [] Dry needling        [] 1 or 2 muscles        [] 3 or more muscles      []  Other      Total Billable timed codes 38 3    Time on untimed codes         Time In: 1:57 pm              Time Out: 2:35 pm  Total Time: 38 minutes    Electronically signed by:  Nikki

## 2025-05-16 ENCOUNTER — APPOINTMENT (OUTPATIENT)
Dept: PHYSICAL THERAPY | Age: 70
End: 2025-05-16
Payer: COMMERCIAL

## 2025-05-21 ENCOUNTER — HOSPITAL ENCOUNTER (OUTPATIENT)
Dept: PHYSICAL THERAPY | Age: 70
Setting detail: THERAPIES SERIES
Discharge: HOME | End: 2025-05-21
Payer: COMMERCIAL

## 2025-05-21 PROCEDURE — 97110 THERAPEUTIC EXERCISES: CPT

## 2025-05-21 NOTE — FLOWSHEET NOTE
[facility]   [address]   Phone: [phone]   Fax: [phone]    Physical Therapy Daily Treatment Note      Date:  2025  Patient Name:  Chey Colmenares    :  1955  MRN: [id number]  Physician:  Jose Thomason PA-C      Insurance: [facility] (Approval Valid  -  12 Visits)  Diagnosis: M75.112 (ICD-10-CM) - Incomplete tear of left rotator cuff, unspecified whether traumatic    Onset Date:  2025    Next  Appt: 2025  Visit# / total visits:  ( auth)  Cancels/No Shows: 0/0    Precautions: Left rotator cuff repair by Dr. Alvarez in       Subjective:     Patient arrived and states she was sick over the weekend with the flu. States she hasn't been able to do much over the last few days due to feeling tired.     Pain:  [] Yes  [x] No   Location: L shoulder    Pain Rating (0-10 scale): 0/10 at rest, 3/10 with movement   Pain altered Tx:  [x] No  [] Yes  Action:    Objective:  INTERVENTIONS  INTERVENTIONS  Reps/ Time Weight/ Level Completed  Today Comments          Therapeutic Exercise       Pulley's (flexion, scaption) 2 min  x Added scaption /14   Supine shoulder AAROM with dowel samina (flexion, scaption, ER) 20x each      Supine chest press with dowel samina 20x 2#   Added weight 2025   Supine hands clasped 20x      Table slides (flexion, scaption, abduction) 20x each             Side lying        R ER w/towel roll  10x2 1# x Added resistance    R shoulder Abduction  10x2 1# x Added resistance    R shoulder flexion 10x2 1# x Added resistance    R shoulder extension  10x2                    Seated        Scapular retractions 20x  x    Shoulder shrugs 20x   x    Shoulder rolls 20x fwd  20x bwd  x    3-way bicep curls 20x each 2# x Inc resistance and reps    (B) shoulder hor. Abd  10x3 Yellow  x  inc sets   (B) shoulder ER 10x3 Yellow  x  inc sets   Seated rows/extensions 10x3 red x

## 2025-05-23 ENCOUNTER — HOSPITAL ENCOUNTER (OUTPATIENT)
Dept: PHYSICAL THERAPY | Age: 70
Setting detail: THERAPIES SERIES
Discharge: HOME | End: 2025-05-23
Payer: COMMERCIAL

## 2025-05-23 PROCEDURE — 97110 THERAPEUTIC EXERCISES: CPT

## 2025-05-23 NOTE — FLOWSHEET NOTE
Vasocompression      [] Gait Training      [] Dry needling        [] 1 or 2 muscles        [] 3 or more muscles      []  Other      Total Billable timed codes 40 3    Time on untimed codes         Time In: 12:45 pm              Time Out: 1:25 pm  Total Time: 40 minutes    Electronically signed by:  Nikki Moore PT

## 2025-05-28 ENCOUNTER — TRANSCRIBE ORDERS (OUTPATIENT)
Dept: ADMINISTRATIVE | Age: 70
End: 2025-05-28

## 2025-05-28 ENCOUNTER — HOSPITAL ENCOUNTER (OUTPATIENT)
Dept: PHYSICAL THERAPY | Age: 70
Setting detail: THERAPIES SERIES
Discharge: HOME | End: 2025-05-28
Payer: COMMERCIAL

## 2025-05-28 DIAGNOSIS — Z78.0 POSTMENOPAUSAL: Primary | ICD-10-CM

## 2025-05-28 PROCEDURE — 97110 THERAPEUTIC EXERCISES: CPT

## 2025-05-28 NOTE — FLOWSHEET NOTE
[facility]   [address]   Phone: [phone]   Fax: [phone]    Physical Therapy Daily Treatment Note      Date:  2025  Patient Name:  Chey Colmenares    :  1955  MRN: [id number]  Physician:  Jose Thomason PA-C      Insurance: [facility] (Approval Valid  -  12 Visits)  Diagnosis: M75.112 (ICD-10-CM) - Incomplete tear of left rotator cuff, unspecified whether traumatic    Onset Date:  2025    Next  Appt: 2025  Visit# / total visits:  ( auth)  Cancels/No Shows: 0/0    Precautions: Left rotator cuff repair by Dr. Alvarez in       Subjective:     Patient arrived and states she is fatigued from this morning. States she was doing more around the house prior to appointment.     Pain:  [x] Yes  [] No   Location: L shoulder    Pain Rating (0-10 scale): 4/10 at rest, 4/10 with movement   Pain altered Tx:  [x] No  [] Yes  Action:    Objective:  INTERVENTIONS  INTERVENTIONS  Reps/ Time Weight/ Level Completed  Today Comments          Therapeutic Exercise       Pulley's (flexion, scaption) 2 min each   Added scaption /14   Supine shoulder AAROM with dowel samina (flexion, scaption, ER) 20x each      Supine chest press with dowel samina 20x 2#   Added weight 2025   Supine hands clasped 20x      Table slides (flexion, scaption, abduction) 20x each             Side lying        L ER w/towel roll  10x2 1# x Added resistance    L shoulder Abduction  10x2 1# x Added resistance 5/14   L shoulder flexion 10x2 1# x Added resistance 5/14   L shoulder extension  10x2                    Seated        UBE fwd/back 1'/1'  x    Scapular retractions 20x  x    Shoulder shrugs 20x   x    Shoulder rolls 20x fwd  20x bwd  x    3-way bicep curls 20x each 2# x Inc resistance and reps    (B) shoulder hor. Abd  10x2 Red  x  inc resistance and dec reps   (B) shoulder ER 10x2 Red  x  inc resistance and dec reps

## 2025-06-02 ENCOUNTER — HOSPITAL ENCOUNTER (OUTPATIENT)
Dept: PHYSICAL THERAPY | Age: 70
Setting detail: THERAPIES SERIES
Discharge: HOME | End: 2025-06-02
Payer: COMMERCIAL

## 2025-06-02 PROCEDURE — 97110 THERAPEUTIC EXERCISES: CPT

## 2025-06-02 NOTE — FLOWSHEET NOTE
[facility]   [address]   Phone: [phone]   Fax: [phone]    Physical Therapy Daily Treatment Note      Date:  2025  Patient Name:  Chey Colmenares    :  1955  MRN: [id number]  Physician:  Jose Thomason PA-C      Insurance: [facility] (Approval Valid  -  12 Visits)  Diagnosis: M75.112 (ICD-10-CM) - Incomplete tear of left rotator cuff, unspecified whether traumatic    Onset Date:  2025    Next  Appt: 2025  Visit# / total visits:  ()  Cancels/No Shows: 0/0    Precautions: Left rotator cuff repair by Dr. Alvarez in       Subjective:     Patient arrived slightly fatigued due to walking from bus stop to clinic this date. Patient states she has minimal pain unless she really challenges L UE.     Pain:  [x] Yes  [] No   Location: L shoulder    Pain Rating (0-10 scale): 4/10 at rest, 4/10 with movement   Pain altered Tx:  [x] No  [] Yes  Action:    Objective:  INTERVENTIONS  INTERVENTIONS  Reps/ Time Weight/ Level Completed  Today Comments          Therapeutic Exercise       Pulley's (flexion, scaption) 2 min each   Added scaption /14   Supine shoulder AAROM with dowel samina (flexion, scaption, ER) 20x each      Supine chest press with dowel samina 20x 2#   Added weight 2025   Supine hands clasped 20x      Table slides (flexion, scaption, abduction) 20x each             Side lying        L ER w/towel roll  10x2 1#  Added resistance 5/14   L shoulder Abduction  10x2 1#  Added resistance 5/14   L shoulder flexion 10x2 1#  Added resistance 5/14   L shoulder extension  10x2                    Seated        UBE fwd/back 1'/1'  x    Scapular retractions 20x  x    Shoulder shrugs 20x   x    Shoulder rolls 20x fwd  20x bwd  x    3-way bicep curls 20x each 2# x Inc resistance and reps /14   (B) shoulder hor. Abd  10x2 Red  x  inc resistance and dec reps   (B) shoulder ER 10x2 Red  x  inc

## 2025-06-04 ENCOUNTER — APPOINTMENT (OUTPATIENT)
Dept: PHYSICAL THERAPY | Age: 70
End: 2025-06-04
Payer: COMMERCIAL

## 2025-06-10 ENCOUNTER — HOSPITAL ENCOUNTER (OUTPATIENT)
Dept: PHYSICAL THERAPY | Age: 70
Setting detail: THERAPIES SERIES
Discharge: HOME | End: 2025-06-10
Payer: COMMERCIAL

## 2025-06-10 PROCEDURE — 97110 THERAPEUTIC EXERCISES: CPT

## 2025-06-10 NOTE — PROGRESS NOTES
Shoulder flexion AROM 2x10  x New 5/23          Standing       Ball on wall  10x3\" ea    Added 5/28 flexion/abd/add    Dowel samina AAROM (flexion, scaption, ER, extension, IR) 2x10  x Added IR 5/23   Shoulder IR/ER 2x10 Red x New 6/10  Discontinued ER due to increased difficulty and inability to maintain proper form   Finger ladder (flexion) 10x  x New 6/10   Other BP in sitting 128/70,      Patient Education/Home Program:   Patient Education:  [x] Yes  [] No  [x] Reviewed Prior HEP/Ed  Method of Education: [x] Verbal  [x] Demo  [] Written  Comprehension of Education:   [x] Verbalizes understanding.  [x] Demonstrates understanding.  [x] Needs review.  [] Demonstrates/verbalizes HEP/Ed previously given.     Home Exercise Program:  Access Code: [email or web address]  URL: [email or web address]  Date: 05/14/2025  Prepared by: Nikki Moore    Exercises  - Seated Shoulder Rolls  - 1 x daily - 7 x weekly - 2 sets - 10 reps  - Seated Scapular Retraction  - 1 x daily - 7 x weekly - 2 sets - 10 reps  - Seated Shoulder Flexion Towel Slide at Table Top  - 1 x daily - 7 x weekly - 2 sets - 10 reps  - Seated Shoulder Scaption Slide at Table Top with Forearm in Neutral  - 1 x daily - 7 x weekly - 2 sets - 10 reps  - Seated Shoulder Abduction Towel Slide at Table Top  - 1 x daily - 7 x weekly - 2 sets - 10 reps  - Shoulder Flexion Overhead with Dowel  - 1 x daily - 7 x weekly - 2 sets - 10 reps  - Standing Shoulder Abduction AAROM with Dowel  - 1 x daily - 7 x weekly - 2 sets - 10 reps  - Standing Shoulder External Rotation AAROM with Dowel  - 1 x daily - 7 x weekly - 2 sets - 10 reps  - Standing Shoulder Extension with Dowel  - 1 x daily - 7 x weekly - 2 sets - 10 reps  - Standing Shoulder Extension AAROM with Dowel  - 1 x daily - 7 x weekly - 2 sets - 10 reps  - Seated Bicep Curls Supinated with Dumbbells  - 1 x daily - 7 x weekly - 2 sets - 10 reps  - Standing Bicep Curls Neutral with Dumbbells  - 1 x daily - 7 x weekly - 2

## 2025-06-13 ENCOUNTER — HOSPITAL ENCOUNTER (OUTPATIENT)
Dept: PHYSICAL THERAPY | Age: 70
Setting detail: THERAPIES SERIES
Discharge: HOME | End: 2025-06-13
Payer: COMMERCIAL

## 2025-06-13 PROCEDURE — 97110 THERAPEUTIC EXERCISES: CPT

## 2025-06-13 NOTE — FLOWSHEET NOTE
[facility] Outpatient Physical Therapy   [address]   Phone: [phone]   Fax: [phone]    Physical Therapy Daily Treatment Note      Date:  2025  Patient Name:  Chey Colmenares    :  1955  MRN: [id number]  Physician:  Jose Thomason PA-C      Insurance: [facility] (Approval Valid  -  12 Visits)  Diagnosis: M75.112 (ICD-10-CM) - Incomplete tear of left rotator cuff, unspecified whether traumatic    Onset Date:  2025    Next  Appt: 2025  Visit# / total visits:  (10/12 auth)  Cancels/No Shows: 0/0    Precautions: Left rotator cuff repair by Dr. Alvarez in       Subjective:    Patient without c/o pain this AM. Reports that she completed exercises this morning and was a little sore afterwards. Soreness has subsided when     Pain:  [] Yes  [x] No   Location: L shoulder    Pain Rating (0-10 scale): denies/10  Pain altered Tx:  [x] No  [] Yes  Action:    Objective:  INTERVENTIONS  INTERVENTIONS  Reps/ Time Weight/ Level Completed  Today Comments          Therapeutic Exercise       Pulley's (flexion, scaption) 2 min each  x Added scaption /14   Supine shoulder AAROM with dowel samina (flexion, scaption, ER) 20x each      Supine chest press with dowel samina 20x 2#   Added weight 2025   Supine hands clasped 20x      Table slides (flexion, scaption, abduction) 20x each             Side lying        L ER w/towel roll  10x2 1# x No weight    L shoulder Abduction  10x2 1# x No weight /13   L shoulder flexion 10x2 1# x No weight    L shoulder extension  10x2                    Seated        UBE fwd/back 2'/2' 2     Scapular retractions 20x      Shoulder shrugs 20x       Shoulder rolls 20x fwd  20x bwd  x    3-way bicep curls 2x15 2# x Increased reps    (B) shoulder hor. Abd  10x2 Red  x  inc resistance and dec reps   (B) shoulder ER 10x2 Red  x  inc resistance and dec reps   Putty w/cone  20 holes    Added , L UE only,

## 2025-06-16 ENCOUNTER — OFFICE VISIT (OUTPATIENT)
Dept: ORTHOPEDIC SURGERY | Age: 70
End: 2025-06-16
Payer: COMMERCIAL

## 2025-06-16 VITALS — BODY MASS INDEX: 35.65 KG/M2 | WEIGHT: 214 LBS | HEIGHT: 65 IN | RESPIRATION RATE: 14 BRPM

## 2025-06-16 DIAGNOSIS — M75.112 INCOMPLETE TEAR OF LEFT ROTATOR CUFF, UNSPECIFIED WHETHER TRAUMATIC: Primary | ICD-10-CM

## 2025-06-16 PROCEDURE — G8417 CALC BMI ABV UP PARAM F/U: HCPCS

## 2025-06-16 PROCEDURE — 1123F ACP DISCUSS/DSCN MKR DOCD: CPT

## 2025-06-16 PROCEDURE — G8399 PT W/DXA RESULTS DOCUMENT: HCPCS

## 2025-06-16 PROCEDURE — 1036F TOBACCO NON-USER: CPT

## 2025-06-16 PROCEDURE — G8428 CUR MEDS NOT DOCUMENT: HCPCS

## 2025-06-16 PROCEDURE — 1126F AMNT PAIN NOTED NONE PRSNT: CPT

## 2025-06-16 PROCEDURE — 99213 OFFICE O/P EST LOW 20 MIN: CPT

## 2025-06-16 PROCEDURE — 1090F PRES/ABSN URINE INCON ASSESS: CPT

## 2025-06-16 PROCEDURE — 3017F COLORECTAL CA SCREEN DOC REV: CPT

## 2025-06-16 NOTE — PROGRESS NOTES
activity as tolerated and I will see her back in my clinic on an as needed basis for any recurrence of her symptoms but she was encouraged to contact our office with any questions or concerns that she may have.    Total time spent on visit: 20 minutes    This note is created with the assistance of a speech recognition program.  While intending to generate adocument that actually reflects the content of the visit, the document can still have some errors including those of syntax and sound a like substitutions which may escape proof reading.  It such instances, actual meaningcan be extrapolated by contextual diversion.

## 2025-06-23 ENCOUNTER — HOSPITAL ENCOUNTER (OUTPATIENT)
Dept: WOMENS IMAGING | Age: 70
Discharge: HOME | End: 2025-06-25
Payer: COMMERCIAL

## 2025-06-23 DIAGNOSIS — Z78.0 POSTMENOPAUSAL: ICD-10-CM

## 2025-06-23 PROCEDURE — 77080 DXA BONE DENSITY AXIAL: CPT

## 2025-06-25 ENCOUNTER — HOSPITAL ENCOUNTER (OUTPATIENT)
Dept: PHYSICAL THERAPY | Age: 70
Setting detail: THERAPIES SERIES
Discharge: HOME | End: 2025-06-25
Payer: COMMERCIAL

## 2025-06-25 PROCEDURE — 97110 THERAPEUTIC EXERCISES: CPT

## 2025-06-25 NOTE — FLOWSHEET NOTE
[facility]   [address]   Phone: [phone]   Fax: [phone]    Physical Therapy Daily Treatment Note      Date:  2025  Patient Name:  Chey Colmenares    :  1955  MRN: [id number]  Physician:  Jose Thomason PA-C      Insurance: [facility] (Approval Valid  -  12 Visits)  Diagnosis: M75.112 (ICD-10-CM) - Incomplete tear of left rotator cuff, unspecified whether traumatic    Onset Date:  2025    Next  Appt: 2025  Visit# / total visits:  ( auth)  Cancels/No Shows: 0/0    Precautions: Left rotator cuff repair by Dr. Alvarez in       Subjective:     Patient arrived and states she saw MD and she was told she improved at least 30% and does not have pain upon arrival, and notices better motion.     Pain:  [] Yes  [x] No   Location: L shoulder    Pain Rating (0-10 scale): denies/10  Pain altered Tx:  [x] No  [] Yes  Action:    Objective:  INTERVENTIONS  INTERVENTIONS  Reps/ Time Weight/ Level Completed  Today Comments          Therapeutic Exercise       Pulley's (flexion, scaption) 2 min each  x Added scaption /14   Supine shoulder AAROM with dowel samina (flexion, scaption, ER) 20x each      Supine chest press with dowel samina 20x 2#   Added weight 2025   Supine hands clasped 20x      Table slides (flexion, scaption, abduction) 20x each             Side lying        L ER w/towel roll  10x3 1# x 6/25 inc sets   L shoulder Abduction  10x3 1# x 6/25 inc sets   L shoulder flexion 10x3 1# x 6/25 inc sets   L shoulder extension  10x3 1# x 6/25 inc sets                 Seated        UBE fwd/back 2'/2' 2     Scapular retractions 20x      Shoulder shrugs 20x       Shoulder rolls 20x fwd  20x bwd  x    3-way bicep curls 3x15 2# x 6/25 inc sets   (B) shoulder hor. Abd  10x3 Red  x 6/25 inc sets   (B) shoulder ER 10x3 Red  x 6/25 inc sets   Putty w/cone  20 holes    Added 5/21, L UE only, sub max scap depression

## 2025-06-27 ENCOUNTER — HOSPITAL ENCOUNTER (OUTPATIENT)
Dept: PHYSICAL THERAPY | Age: 70
Setting detail: THERAPIES SERIES
End: 2025-06-27
Payer: COMMERCIAL

## 2025-07-22 ENCOUNTER — HOSPITAL ENCOUNTER (OUTPATIENT)
Age: 70
Discharge: HOME | End: 2025-07-22
Payer: COMMERCIAL

## 2025-07-22 LAB
CHOLEST SERPL-MCNC: 145 MG/DL (ref 0–199)
CHOLESTEROL/HDL RATIO: 3
HDLC SERPL-MCNC: 49 MG/DL
LDLC SERPL CALC-MCNC: 66 MG/DL (ref 0–100)
TRIGL SERPL-MCNC: 150 MG/DL (ref 0–149)

## 2025-07-22 PROCEDURE — 80061 LIPID PANEL: CPT

## 2025-07-22 PROCEDURE — 36415 COLL VENOUS BLD VENIPUNCTURE: CPT
